# Patient Record
Sex: FEMALE | Race: WHITE | NOT HISPANIC OR LATINO | Employment: FULL TIME | URBAN - METROPOLITAN AREA
[De-identification: names, ages, dates, MRNs, and addresses within clinical notes are randomized per-mention and may not be internally consistent; named-entity substitution may affect disease eponyms.]

---

## 2017-03-21 ENCOUNTER — OFFICE VISIT (OUTPATIENT)
Dept: INTERNAL MEDICINE | Facility: CLINIC | Age: 37
End: 2017-03-21
Payer: COMMERCIAL

## 2017-03-21 VITALS
BODY MASS INDEX: 28.12 KG/M2 | DIASTOLIC BLOOD PRESSURE: 68 MMHG | WEIGHT: 175 LBS | HEIGHT: 66 IN | HEART RATE: 80 BPM | TEMPERATURE: 98.3 F | OXYGEN SATURATION: 98 % | SYSTOLIC BLOOD PRESSURE: 102 MMHG | RESPIRATION RATE: 12 BRPM

## 2017-03-21 DIAGNOSIS — N92.6 IRREGULAR MENSTRUAL CYCLE: Primary | ICD-10-CM

## 2017-03-21 DIAGNOSIS — K64.9 HEMORRHOIDS, UNSPECIFIED HEMORRHOID TYPE: ICD-10-CM

## 2017-03-21 LAB
BASOPHILS # BLD AUTO: 0 10E9/L (ref 0–0.2)
BASOPHILS NFR BLD AUTO: 0.3 %
DIFFERENTIAL METHOD BLD: NORMAL
EOSINOPHIL # BLD AUTO: 0.1 10E9/L (ref 0–0.7)
EOSINOPHIL NFR BLD AUTO: 0.5 %
ERYTHROCYTE [DISTWIDTH] IN BLOOD BY AUTOMATED COUNT: 13.2 % (ref 10–15)
HCT VFR BLD AUTO: 39.8 % (ref 35–47)
HGB BLD-MCNC: 13.3 G/DL (ref 11.7–15.7)
LYMPHOCYTES # BLD AUTO: 2.4 10E9/L (ref 0.8–5.3)
LYMPHOCYTES NFR BLD AUTO: 24.3 %
MCH RBC QN AUTO: 29.6 PG (ref 26.5–33)
MCHC RBC AUTO-ENTMCNC: 33.4 G/DL (ref 31.5–36.5)
MCV RBC AUTO: 88 FL (ref 78–100)
MONOCYTES # BLD AUTO: 0.7 10E9/L (ref 0–1.3)
MONOCYTES NFR BLD AUTO: 7.4 %
NEUTROPHILS # BLD AUTO: 6.7 10E9/L (ref 1.6–8.3)
NEUTROPHILS NFR BLD AUTO: 67.5 %
PLATELET # BLD AUTO: 242 10E9/L (ref 150–450)
RBC # BLD AUTO: 4.5 10E12/L (ref 3.8–5.2)
WBC # BLD AUTO: 10 10E9/L (ref 4–11)

## 2017-03-21 PROCEDURE — 87624 HPV HI-RISK TYP POOLED RSLT: CPT | Performed by: NURSE PRACTITIONER

## 2017-03-21 PROCEDURE — 36415 COLL VENOUS BLD VENIPUNCTURE: CPT | Performed by: NURSE PRACTITIONER

## 2017-03-21 PROCEDURE — 85025 COMPLETE CBC W/AUTO DIFF WBC: CPT | Performed by: NURSE PRACTITIONER

## 2017-03-21 PROCEDURE — G0145 SCR C/V CYTO,THINLAYER,RESCR: HCPCS | Performed by: NURSE PRACTITIONER

## 2017-03-21 PROCEDURE — 84443 ASSAY THYROID STIM HORMONE: CPT | Performed by: NURSE PRACTITIONER

## 2017-03-21 PROCEDURE — 99213 OFFICE O/P EST LOW 20 MIN: CPT | Performed by: NURSE PRACTITIONER

## 2017-03-21 ASSESSMENT — ANXIETY QUESTIONNAIRES
1. FEELING NERVOUS, ANXIOUS, OR ON EDGE: NOT AT ALL
3. WORRYING TOO MUCH ABOUT DIFFERENT THINGS: NOT AT ALL
6. BECOMING EASILY ANNOYED OR IRRITABLE: NOT AT ALL
7. FEELING AFRAID AS IF SOMETHING AWFUL MIGHT HAPPEN: NOT AT ALL
2. NOT BEING ABLE TO STOP OR CONTROL WORRYING: NOT AT ALL
GAD7 TOTAL SCORE: 0
5. BEING SO RESTLESS THAT IT IS HARD TO SIT STILL: NOT AT ALL

## 2017-03-21 ASSESSMENT — PATIENT HEALTH QUESTIONNAIRE - PHQ9: 5. POOR APPETITE OR OVEREATING: NOT AT ALL

## 2017-03-21 NOTE — PATIENT INSTRUCTIONS
Labs in [address]     Ultrasound pelvis   [phone] to schedule     FMG: [facility] [place] [phone]   [email or web address]  FHN: [facility] [place] [phone]   [email or web address]

## 2017-03-21 NOTE — LETTER
March 28, 2017      Gayle Moya  [address] [address] [place] [id number]    Dear Ms.Nasreenbill,    I am happy to inform you that your recent cervical cancer screening test (PAP smear) was normal.      Preventative screenings such as this help to ensure your health for years to come. You should repeat a pap smear in 3 years, unless otherwise directed.      You will still need to return to the clinic every year for your annual exam and other preventive tests.     Please contact the clinic if you have further questions.       Sincerely,      PETRA Fabian CNP

## 2017-03-21 NOTE — MR AVS SNAPSHOT
After Visit Summary   3/21/2017    Gayle Moya    MRN: [id number]           Patient Information     Date Of Birth          1980        Visit Information        Provider Department      3/21/2017 3:40 PM Jacqueline Dodd APRN CNP [facility]        Today's Diagnoses     Irregular menstrual cycle    -  1    Hemorrhoids, unspecified hemorrhoid type          Care Instructions    Labs in [address]     Ultrasound pelvis   [phone] to schedule     FMG: [facility] [phone]   [email or web address]  FHN: [facility] [phone]   [email or web address]        Follow-ups after your visit        Additional Services     COLORECTAL SURGERY REFERRAL       Your provider has referred you to: FMG: [facility] [phone]   [email or web address]  FHN: [facility] [phone]   [email or web address]    Referral Reason(s): Hemorrhoids  Special Concerns: None  This referral is: Elective (week +)  It is OK to leave a message on patient's voicemail.    Please be aware that coverage of these services is subject to the terms and limitations of your health insurance plan.  Call member services at your health plan with any benefit or coverage questions.      Please bring the following with you to your appointment:    (1) Any X-Rays, CTs or MRIs which have been performed.  Contact the facility where they were done to arrange for  prior to your scheduled appointment.    (2) List of current medications  (3) This referral request   (4) Any documents/labs given to you for this referral                  Future tests that were ordered for you today     Open Future Orders        Priority Expected Expires Ordered    US Pelvic Complete w Transvaginal Routine  "2017 3/21/2018 3/21/2017            Who to contact     If you have questions or need follow up information about today's clinic visit or your schedule please contact [facility] directly at [phone].  Normal or non-critical lab and imaging results will be communicated to you by MyChart, letter or phone within 4 business days after the clinic has received the results. If you do not hear from us within 7 days, please contact the clinic through [employer] or phone. If you have a critical or abnormal lab result, we will notify you by phone as soon as possible.  Submit refill requests through [employer] or call your pharmacy and they will forward the refill request to us. Please allow 3 business days for your refill to be completed.          Additional Information About Your Visit        [employer] Information     [employer] lets you send messages to your doctor, view your test results, renew your prescriptions, schedule appointments and more. To sign up, go to [email or web address] . Click on \"Log in\" on the left side of the screen, which will take you to the Welcome page. Then click on \"Sign up Now\" on the right side of the page.     You will be asked to enter the access code listed below, as well as some personal information. Please follow the directions to create your username and password.     Your access code is: [email or web address]  Expires: 2017  4:30 PM     Your access code will  in 90 days. If you need help or a new code, please call your [facility] clinic or [phone].        Care EveryWhere ID     This is your Care EveryWhere ID. This could be used by other organizations to access your [facility] medical records  [id number]        Your Vitals Were     Pulse Temperature Respirations Height Last Period Pulse Oximetry    80 98.3  F (36.8  C) (Oral) 12 5' 6\" (1.676 m) 2017 98%    Breastfeeding? BMI (Body Mass Index)                No 28.25 kg/m2           Blood Pressure from Last 3 " Encounters:   03/21/17 102/68   05/18/16 114/74   11/24/15 108/78    Weight from Last 3 Encounters:   03/21/17 175 lb (79.4 kg)   05/18/16 168 lb (76.2 kg)   11/24/15 168 lb 6.4 oz (76.4 kg)              We Performed the Following     CBC with platelets differential     COLORECTAL SURGERY REFERRAL     Pap imaged thin layer screen with HPV - recommended age 30 - 65 years (select HPV order below)     TSH with free T4 reflex        Primary Care Provider Office Phone # Fax #    Yi Gottlieb [phone]       [facility] [address] [place] [address]        Thank you!     Thank you for choosing [facility]  for your care. Our goal is always to provide you with excellent care. Hearing back from our patients is one way we can continue to improve our services. Please take a few minutes to complete the written survey that you may receive in the mail after your visit with us. Thank you!             Your Updated Medication List - Protect others around you: Learn how to safely use, store and throw away your medicines at www.disposemymeds.org.          This list is accurate as of: 3/21/17  4:30 PM.  Always use your most recent med list.                   Brand Name Dispense Instructions for use    citalopram 20 MG tablet    celeXA    90 tablet    Take 1 tablet (20 mg) by mouth At Bedtime       triamcinolone 0.1 % cream    KENALOG    15 g    Apply sparingly to affected area three times daily for 14 days.       ZYRTEC ALLERGY PO

## 2017-03-21 NOTE — NURSING NOTE
"Chief Complaint   Patient presents with     Vaginal Bleeding     Spotting between periods x a few months.      Hemorrhoids     Check hemorrhoid that developed whild pregnancy.        Initial /68 (BP Location: Left arm, Patient Position: Chair, Cuff Size: Adult Large)  Pulse 80  Temp 98.3  F (36.8  C) (Oral)  Resp 12  Ht 5' 6\" (1.676 m)  Wt 175 lb (79.4 kg)  LMP 03/04/2017  SpO2 98%  Breastfeeding? No  BMI 28.25 kg/m2 Estimated body mass index is 28.25 kg/(m^2) as calculated from the following:    Height as of this encounter: 5' 6\" (1.676 m).    Weight as of this encounter: 175 lb (79.4 kg).  Medication Reconciliation: complete     AISSATOU Larkin      "

## 2017-03-21 NOTE — PROGRESS NOTES
SUBJECTIVE:                                                    Gayle Moya is a 36 year old female who presents to clinic today for the following health issues:    Chief Complaint   Patient presents with     Vaginal Bleeding     Spotting between periods x a few months.    this month and past month  pregnancy not possible   Stress with work and working out - then spotting     Worked out more vigorously and noted spotting twice after     She has positive BRCA gene and mother  breast cancer at younger age so anything abnormal with female parts makes her nervous         Hemorrhoids     Check hemorrhoid that developed while pregnancy. Child is 6 years old              Problem list and histories reviewed & adjusted, as indicated.  Additional history: as documented    Patient Active Problem List   Diagnosis     Supervision of normal pregnancy      delivery delivered     Meningitis     Viral meningitis     Major depressive disorder, single episode, moderate (H)     BRCA gene positive     Past Surgical History   Procedure Laterality Date     Cryotherapy       CERVICAL     Tonsillectomy & adenoidectomy       Hc tooth extraction w/forcep       Hystrosalingogram[        section  3/15/2013     Procedure:  SECTION;   Section for failure to descend;  Surgeon: Avelina Steele MD;  Location:  OR       Social History   Substance Use Topics     Smoking status: Former Smoker     Quit date: 2012     Smokeless tobacco: Not on file     Alcohol use Not on file      Comment: 1 glass of wine every other night     Family History   Problem Relation Age of Onset     Breast Cancer Mother 37           Reviewed and updated as needed this visit by clinical staff  Tobacco  Allergies  Meds  Problems  Soc Hx      Reviewed and updated as needed this visit by Provider  Allergies  Meds  Problems         ROS:  Constitutional, HEENT, cardiovascular, pulmonary, GI, , musculoskeletal,  "neuro, skin, endocrine and psych systems are negative, except as otherwise noted.    OBJECTIVE:                                                    /68 (BP Location: Left arm, Patient Position: Chair, Cuff Size: Adult Large)  Pulse 80  Temp 98.3  F (36.8  C) (Oral)  Resp 12  Ht 5' 6\" (1.676 m)  Wt 175 lb (79.4 kg)  LMP 03/04/2017  SpO2 98%  Breastfeeding? No  BMI 28.25 kg/m2  Body mass index is 28.25 kg/(m^2).  GENERAL: alert and no distress  RESP: lungs clear to auscultation - no rales, rhonchi or wheezes  CV: regular rate and rhythm  ABDOMEN: soft, nontender,  and bowel sounds normal  Pelvic Exam:  Vagina: Moist, pink, no abnormal discharge  Cervix: Pap smear is taken,  no visible lesions; friable with PAP   Uterus: Normal size, non-tender, mobile  Ovaries: No mass, non-tender, mobile  Rectal exam: small external hemorrhoid ; no additional lesion with bearing down   MS: no gross musculoskeletal defects noted, no edema  NEURO: Normal strength and tone, mentation intact and speech normal  PSYCH: mentation appears normal, affect normal/bright    Diagnostic Test Results:  PAP   Ultrasound   Labs      ASSESSMENT/PLAN:                                                              ICD-10-CM    1. Irregular menstrual cycle N92.6 CBC with platelets differential     TSH with free T4 reflex     US Pelvic Complete w Transvaginal     Pap imaged thin layer screen with HPV - recommended age 30 - 65 years (select HPV order below)   2. Hemorrhoids, unspecified hemorrhoid type K64.9 COLORECTAL SURGERY REFERRAL       Patient Instructions   Labs in [address]     Ultrasound pelvis   [phone] to schedule     FMG: [facility] [place] [phone]   [email or web address]  FHN: [facility] [place] [phone]   [email or web address]      PETRA Fabian CNP  [facility]    "

## 2017-03-21 NOTE — LETTER
[facility]  [address], [address]  [place] [address]  [phone]        March 29, 2017    Gayle Moya  [address] [address] [place] [id number]            Dear Ms. Gayle Moya:      The results of your recent blood tests were within acceptable range.  If you have any further questions or problems, please contact our office.    Sincerely,        MARTIN Clemons

## 2017-03-22 LAB — TSH SERPL DL<=0.005 MIU/L-ACNC: 1.64 MU/L (ref 0.4–4)

## 2017-03-22 ASSESSMENT — PATIENT HEALTH QUESTIONNAIRE - PHQ9: SUM OF ALL RESPONSES TO PHQ QUESTIONS 1-9: 0

## 2017-03-22 ASSESSMENT — ANXIETY QUESTIONNAIRES: GAD7 TOTAL SCORE: 0

## 2017-03-23 LAB
COPATH REPORT: NORMAL
PAP: NORMAL

## 2017-03-24 LAB
FINAL DIAGNOSIS: NORMAL
HPV HR 12 DNA CVX QL NAA+PROBE: NEGATIVE
HPV16 DNA SPEC QL NAA+PROBE: NEGATIVE
HPV18 DNA SPEC QL NAA+PROBE: NEGATIVE
SPECIMEN DESCRIPTION: NORMAL

## 2017-03-27 ENCOUNTER — RADIANT APPOINTMENT (OUTPATIENT)
Dept: ULTRASOUND IMAGING | Facility: CLINIC | Age: 37
End: 2017-03-27
Attending: NURSE PRACTITIONER
Payer: COMMERCIAL

## 2017-03-27 DIAGNOSIS — N92.6 IRREGULAR MENSTRUAL CYCLE: ICD-10-CM

## 2017-03-27 PROCEDURE — 76830 TRANSVAGINAL US NON-OB: CPT | Mod: 59 | Performed by: OBSTETRICS & GYNECOLOGY

## 2017-03-27 PROCEDURE — 76856 US EXAM PELVIC COMPLETE: CPT | Performed by: OBSTETRICS & GYNECOLOGY

## 2017-03-28 NOTE — PROGRESS NOTES
Pap done on 3/21/17. Please send nl pap and Neg HPV letter, ([id number]) for pap in 3 years and annual physical in 1 year.   Chart reviewed,  celio.     FADUMO Soto RN

## 2017-05-09 ENCOUNTER — TELEPHONE (OUTPATIENT)
Dept: INTERNAL MEDICINE | Facility: CLINIC | Age: 37
End: 2017-05-09

## 2017-05-09 DIAGNOSIS — Z15.01 BRCA GENE POSITIVE: Primary | ICD-10-CM

## 2017-05-09 DIAGNOSIS — Z15.09 BRCA GENE POSITIVE: Primary | ICD-10-CM

## 2017-05-09 NOTE — TELEPHONE ENCOUNTER
Reason for Call: Request for an order or referral:Referral    Order or referral being requested: Referral for MRI    Date needed: as soon as possible    Has the patient been seen by the PCP for this problem? YES    Additional comments: Need MRI to check Breast Cancer    Phone number Patient can be reached at:  Cell number on file:    Telephone Information:   Mobile [phone]       Best Time:  anytime    Can we leave a detailed message on this number?  YES    Call taken on 5/9/2017 at 4:14 PM by TERESA HODGES

## 2017-05-09 NOTE — TELEPHONE ENCOUNTER
Left message for pt to call back to find out why she needs an MRI.  Having symptoms?    Last OV 3-21-17 with LC for irregular menstrual cycle, hemorrhoids.

## 2017-05-15 NOTE — TELEPHONE ENCOUNTER
Left voice message for pt that MRI order was approved. Provided phone number for [facility] Radiology Scheduling and asked pt to call back if she has any questions.

## 2017-05-15 NOTE — TELEPHONE ENCOUNTER
Pt calls, she states that in  she had BRCA testing done at [facility] and it came back positive. They recommended and MRI at that time, but she had just started to breast feed. Pt's mother  from Breast Cancer at a very young age and sister also tested positive. Pt now done breast feeding and would like to have MRI done now. She is contemplating having preventative mastectomy done, but wants to have MRI first.     Pt will send a copy of the test results and recommendations to our office. Test results received by

## 2017-05-30 ENCOUNTER — HOSPITAL ENCOUNTER (OUTPATIENT)
Dept: MRI IMAGING | Facility: CLINIC | Age: 37
Discharge: HOME | End: 2017-05-30
Attending: INTERNAL MEDICINE | Admitting: INTERNAL MEDICINE
Payer: COMMERCIAL

## 2017-05-30 DIAGNOSIS — Z15.01 BRCA GENE POSITIVE: ICD-10-CM

## 2017-05-30 DIAGNOSIS — Z15.09 BRCA GENE POSITIVE: ICD-10-CM

## 2017-05-30 PROCEDURE — 25000128 H RX IP 250 OP 636: Performed by: INTERNAL MEDICINE

## 2017-05-30 PROCEDURE — 0159T MR BREAST BILATERAL W/O & W CONTRAST: CPT

## 2017-05-30 PROCEDURE — A9585 GADOBUTROL INJECTION: HCPCS | Performed by: INTERNAL MEDICINE

## 2017-05-30 RX ORDER — GADOBUTROL 604.72 MG/ML
10 INJECTION INTRAVENOUS ONCE
Status: COMPLETED | OUTPATIENT
Start: 2017-05-30 | End: 2017-05-30

## 2017-05-30 RX ADMIN — GADOBUTROL 10 ML: 604.72 INJECTION INTRAVENOUS at 13:34

## 2017-05-31 ENCOUNTER — TELEPHONE (OUTPATIENT)
Dept: MAMMOGRAPHY | Facility: CLINIC | Age: 37
End: 2017-05-31

## 2017-05-31 NOTE — TELEPHONE ENCOUNTER
Patient notified of within normal limits breast MRI.  Instructed patient to have annual screening mammogram.  She states since she is BRCA positive she is now planning on bilateral prophylactic mastectomies.  Encouraged her to call with any questions.

## 2017-08-14 ENCOUNTER — TELEPHONE (OUTPATIENT)
Dept: INTERNAL MEDICINE | Facility: CLINIC | Age: 37
End: 2017-08-14

## 2017-08-14 DIAGNOSIS — N83.209 OVARIAN CYST: Primary | ICD-10-CM

## 2017-08-14 NOTE — TELEPHONE ENCOUNTER
Patient states she had a pelvic US done in March 2017 for mid cycle bleeding.  She received a message that she is to schedule a follow up US now as there had been a complex cyst seen last time.  If she is to have US done then please order then leave message for pt once this has been done.  NAY Marroquin R.N.

## 2017-08-14 NOTE — TELEPHONE ENCOUNTER
Left V/M for pt letting her know that PCP placed U/S order and gave her tel # to call to set that up

## 2017-08-15 DIAGNOSIS — F32.1 MAJOR DEPRESSIVE DISORDER, SINGLE EPISODE, MODERATE (H): ICD-10-CM

## 2017-08-15 RX ORDER — CITALOPRAM HYDROBROMIDE 20 MG/1
20 TABLET ORAL AT BEDTIME
Qty: 30 TABLET | Refills: 0 | Status: SHIPPED | OUTPATIENT
Start: 2017-08-15 | End: 2017-09-12

## 2017-08-15 NOTE — TELEPHONE ENCOUNTER
citalopram (CELEXA) 20 MG tablet     Last Written Prescription Date: 11/18/16  Last Fill Quantity: 90, # refills: 1  Last Office Visit with G primary care provider:  03/21/17        Last PHQ-9 score on record=   PHQ-9 SCORE 3/21/2017   Total Score 0

## 2017-08-17 ENCOUNTER — HOSPITAL ENCOUNTER (OUTPATIENT)
Dept: ULTRASOUND IMAGING | Facility: CLINIC | Age: 37
Discharge: HOME | End: 2017-08-17
Attending: INTERNAL MEDICINE | Admitting: INTERNAL MEDICINE
Payer: COMMERCIAL

## 2017-08-17 DIAGNOSIS — N83.209 OVARIAN CYST: ICD-10-CM

## 2017-08-17 PROCEDURE — 76830 TRANSVAGINAL US NON-OB: CPT

## 2017-08-26 ENCOUNTER — TELEPHONE (OUTPATIENT)
Dept: INTERNAL MEDICINE | Facility: CLINIC | Age: 37
End: 2017-08-26

## 2017-08-26 DIAGNOSIS — N83.209 OVARIAN CYST: Primary | ICD-10-CM

## 2017-08-26 NOTE — TELEPHONE ENCOUNTER
Recommend that she sees gynecology for the ovarian cyst on ultrasound.  Appears benign, but with her family history of BRCA gene, would like her to see them.

## 2017-08-28 NOTE — TELEPHONE ENCOUNTER
Pt calls back. Asks what side the ovarian cyst is on. Informed pt that this u/s showed left ovarian cyst - previous u/s showed cyst on the right side.

## 2017-08-28 NOTE — TELEPHONE ENCOUNTER
Pt calls back, informed of recommendation from Dr. Gottlieb. Pt will schedule appt with OB-GYN for follow up.

## 2017-09-12 ENCOUNTER — TELEPHONE (OUTPATIENT)
Dept: INTERNAL MEDICINE | Facility: CLINIC | Age: 37
End: 2017-09-12

## 2017-09-12 DIAGNOSIS — F32.1 MAJOR DEPRESSIVE DISORDER, SINGLE EPISODE, MODERATE (H): ICD-10-CM

## 2017-09-12 RX ORDER — CITALOPRAM HYDROBROMIDE 20 MG/1
20 TABLET ORAL AT BEDTIME
Qty: 90 TABLET | Refills: 0 | Status: SHIPPED | OUTPATIENT
Start: 2017-09-12 | End: 2017-10-10

## 2017-09-12 NOTE — TELEPHONE ENCOUNTER
"Citalopram     Last Written Prescription Date: 8-15-17  Last Fill Quantity: 30, # refills: 0  Last Office Visit with [facility] primary care provider:  5-18-16--depression       Last PHQ-9 score on record=   PHQ-9 SCORE 3/21/2017   Total Score 0     Refilled x 1 8-15-17 with message pt is due for OV.    Spoke with to inform her she is over-due for OV for depression but states \"I've been in your office 3 times and no one told me that I was due for depression.  That's not my problem that I wasn't informed I was due\".    Routing refill request to provider for review/approval because:  Raya given x1 and patient did not follow up, please advise    Please advise, thanks.                "

## 2017-09-12 NOTE — TELEPHONE ENCOUNTER
(Reason for Call:  Medication or medication refill:    Do you use a [facility]?  Name of the pharmacy and phone number for the current request:  [facility] [address]    Name of the medication requested: CITALOPRAM    Other request: NO    Can we leave a detailed message on this number? YES    Phone number patient can be reached at: Cell number on file:    Telephone Information:   Mobile [phone]       Best Time: ANY    Call taken on 9/12/2017 at 11:20 AM by Mae Mathew

## 2017-10-10 ENCOUNTER — OFFICE VISIT (OUTPATIENT)
Dept: INTERNAL MEDICINE | Facility: CLINIC | Age: 37
End: 2017-10-10
Payer: COMMERCIAL

## 2017-10-10 VITALS
WEIGHT: 178 LBS | HEIGHT: 66 IN | HEART RATE: 82 BPM | SYSTOLIC BLOOD PRESSURE: 120 MMHG | OXYGEN SATURATION: 98 % | BODY MASS INDEX: 28.61 KG/M2 | DIASTOLIC BLOOD PRESSURE: 72 MMHG | TEMPERATURE: 98 F

## 2017-10-10 DIAGNOSIS — F32.1 MAJOR DEPRESSIVE DISORDER, SINGLE EPISODE, MODERATE (H): Primary | ICD-10-CM

## 2017-10-10 DIAGNOSIS — Z15.09 BRCA GENE POSITIVE: ICD-10-CM

## 2017-10-10 DIAGNOSIS — Z23 NEED FOR PROPHYLACTIC VACCINATION AND INOCULATION AGAINST INFLUENZA: ICD-10-CM

## 2017-10-10 DIAGNOSIS — N83.202 LEFT OVARIAN CYST: ICD-10-CM

## 2017-10-10 DIAGNOSIS — Z15.01 BRCA GENE POSITIVE: ICD-10-CM

## 2017-10-10 PROCEDURE — 90471 IMMUNIZATION ADMIN: CPT | Performed by: INTERNAL MEDICINE

## 2017-10-10 PROCEDURE — 99213 OFFICE O/P EST LOW 20 MIN: CPT | Mod: 25 | Performed by: INTERNAL MEDICINE

## 2017-10-10 PROCEDURE — 90686 IIV4 VACC NO PRSV 0.5 ML IM: CPT | Performed by: INTERNAL MEDICINE

## 2017-10-10 RX ORDER — CITALOPRAM HYDROBROMIDE 20 MG/1
20 TABLET ORAL AT BEDTIME
Qty: 90 TABLET | Refills: 1 | Status: SHIPPED | OUTPATIENT
Start: 2017-10-10 | End: 2018-04-14

## 2017-10-10 ASSESSMENT — PATIENT HEALTH QUESTIONNAIRE - PHQ9: SUM OF ALL RESPONSES TO PHQ QUESTIONS 1-9: 1

## 2017-10-10 NOTE — NURSING NOTE
"Chief Complaint   Patient presents with     Recheck Medication     citalopram     Referral     for OB/GYn-for possible hysterectomy and bilateral mastectomy-tested positive for braca gene       Initial /72 (BP Location: Left arm, Cuff Size: Adult Large)  Pulse 82  Temp 98  F (36.7  C) (Oral)  Ht 5' 6\" (1.676 m)  Wt 178 lb (80.7 kg)  LMP 09/25/2017  SpO2 98%  Breastfeeding? No  BMI 28.73 kg/m2 Estimated body mass index is 28.73 kg/(m^2) as calculated from the following:    Height as of this encounter: 5' 6\" (1.676 m).    Weight as of this encounter: 178 lb (80.7 kg).  Medication Reconciliation: complete   Jaylyn Castro CMA      "

## 2017-10-10 NOTE — PROGRESS NOTES
Injectable Influenza Immunization Documentation    1.  Is the person to be vaccinated sick today?   No    2. Does the person to be vaccinated have an allergy to a component   of the vaccine?   No    3. Has the person to be vaccinated ever had a serious reaction   to influenza vaccine in the past?   No    4. Has the person to be vaccinated ever had Guillain-Barré syndrome?   No    Form completed by Jaylyn Castro [facility]

## 2017-10-10 NOTE — MR AVS SNAPSHOT
After Visit Summary   10/10/2017    Gayle Moya    MRN: [id number]           Patient Information     Date Of Birth          1980        Visit Information        Provider Department      10/10/2017 8:00 AM Yi Gottlieb MD [facility]        Today's Diagnoses     Major depressive disorder, single episode, moderate (H)    -  1    BRCA gene positive        Left ovarian cyst           Follow-ups after your visit        Additional Services     GENERAL SURG ADULT REFERRAL       Your provider has referred you to: [facility]: [facility] [place] [phone]   [email or web address]    Please be aware that coverage of these services is subject to the terms and limitations of your health insurance plan.  Call member services at your health plan with any benefit or coverage questions.      Please bring the following with you to your appointment:    (1) Any X-Rays, CTs or MRIs which have been performed.  Contact the facility where they were done to arrange for  prior to your scheduled appointment.   (2) List of current medications   (3) This referral request   (4) Any documents/labs given to you for this referral            OB/GYN REFERRAL       Your provider has referred you to:  G: [facility] [phone]   [email or web address]      Please be aware that coverage of these services is subject to the terms and limitations of your health insurance plan.  Call member services at your health plan with any benefit or coverage questions.      Please bring the following with you to your appointment:    (1) Any X-Rays, CTs or MRIs which have been performed.  Contact the facility where they were done to arrange for  prior to your scheduled appointment.   (2) List of current medications   (3) This referral request   (4) Any documents/labs given to you for this referral       "            Future tests that were ordered for you today     Open Future Orders        Priority Expected Expires Ordered    US Pelvic Complete w Transvaginal Routine  10/10/2018 10/10/2017            Who to contact     If you have questions or need follow up information about today's clinic visit or your schedule please contact [facility] directly at [phone].  Normal or non-critical lab and imaging results will be communicated to you by MyChart, letter or phone within 4 business days after the clinic has received the results. If you do not hear from us within 7 days, please contact the clinic through [employer] or phone. If you have a critical or abnormal lab result, we will notify you by phone as soon as possible.  Submit refill requests through [employer] or call your pharmacy and they will forward the refill request to us. Please allow 3 business days for your refill to be completed.          Additional Information About Your Visit        [employer] Information     [employer] lets you send messages to your doctor, view your test results, renew your prescriptions, schedule appointments and more. To sign up, go to [email or web address] . Click on \"Log in\" on the left side of the screen, which will take you to the Welcome page. Then click on \"Sign up Now\" on the right side of the page.     You will be asked to enter the access code listed below, as well as some personal information. Please follow the directions to create your username and password.     Your access code is: [email or web address]  Expires: 2018  8:37 AM     Your access code will  in 90 days. If you need help or a new code, please call your [facility] clinic or [phone].        Care EveryWhere ID     This is your Care EveryWhere ID. This could be used by other organizations to access your [facility] medical records  [id number]        Your Vitals Were     Pulse Temperature Height Last Period Pulse Oximetry Breastfeeding?    82 98  F (36.7 " " C) (Oral) 5' 6\" (1.676 m) 09/25/2017 98% No    BMI (Body Mass Index)                   28.73 kg/m2            Blood Pressure from Last 3 Encounters:   10/10/17 120/72   03/21/17 102/68   05/18/16 114/74    Weight from Last 3 Encounters:   10/10/17 178 lb (80.7 kg)   03/21/17 175 lb (79.4 kg)   05/18/16 168 lb (76.2 kg)              We Performed the Following     GENERAL SURG ADULT REFERRAL     OB/GYN REFERRAL          Where to get your medicines      These medications were sent to [facility] [address] [facility] [address] [place] [address] [address] AT [facility] [address]  [address] [facility] [address], [facility] [address]     Phone:  [phone]     citalopram 20 MG tablet          Primary Care Provider Office Phone # Fax #    Yi Gottlieb [phone]       [address]  [facility] [address]        Equal Access to Services     [facility]: Hadii aad ku hadasho Soomaali, waaxda luqadaha, qaybta kaalmada adeegyada, waxshane marie . So [facility] [phone].    ATENCIÓN: Si habla español, tiene a calderón disposición servicios gratuitos de asistencia lingüística. Llame al [phone].    We comply with applicable federal civil rights laws and [place] laws. We do not discriminate on the basis of race, color, national origin, age, disability, sex, sexual orientation, or gender identity.            Thank you!     Thank you for choosing [facility]  for your care. Our goal is always to provide you with excellent care. Hearing back from our patients is one way we can continue to improve our services. Please take a few minutes to complete the written survey that you may receive in the mail after your visit with us. Thank you!             Your Updated Medication List - Protect others around you: Learn how to safely use, store and throw away your medicines at www.disposemymeds.org.          This list is accurate as of: 10/10/17  8:37 AM.  Always use your most recent " med list.                   Brand Name Dispense Instructions for use Diagnosis    citalopram 20 MG tablet    celeXA    90 tablet    Take 1 tablet (20 mg) by mouth At Bedtime    Major depressive disorder, single episode, moderate (H)       triamcinolone 0.1 % cream    KENALOG    15 g    Apply sparingly to affected area three times daily for 14 days.    Rash       ZYRTEC ALLERGY PO

## 2017-10-10 NOTE — PROGRESS NOTES
"  SUBJECTIVE:   Gayle Moya is a 37 year old female who presents to clinic today for the following health issues:      Depression Followup    Status since last visit: Stable     See PHQ-9 for current symptoms.  Other associated symptoms: None    Complicating factors:   Significant life event:  No   Current substance abuse:  None  Anxiety or Panic symptoms:  No    PHQ-9 Score and MyChart F/U Questions 12/14/2015 11/18/2016 3/21/2017   Total Score 0 1 0   Q9: Suicide Ideation Not at all Not at all Not at all       PHQ-9  English  PHQ-9   Any Language  Suicide Assessment Five-step Evaluation and Treatment (SAFE-T)      Amount of exercise or physical activity: 2-3 days/week for an average of 45-60 minutes    Problems taking medications regularly: No    Medication side effects: none     Diet: regular (no restrictions)    BRCA gene positive.  Patient needs a referral to ob/gyn and general surgery for double mastectomy and double oophorectomy.     Problem list and histories reviewed & adjusted, as indicated.      Reviewed and updated as needed this visit by clinical staffTobacco  Allergies  Meds  Med Hx  Surg Hx  Fam Hx  Soc Hx      Reviewed and updated as needed this visit by Provider         ROS:  C: NEGATIVE for fever, chills, change in weight  E/M: NEGATIVE for ear, mouth and throat problems  R: NEGATIVE for significant cough or SOB  CV: NEGATIVE for chest pain, palpitations or peripheral edema    OBJECTIVE:     /72 (BP Location: Left arm, Cuff Size: Adult Large)  Pulse 82  Temp 98  F (36.7  C) (Oral)  Ht 5' 6\" (1.676 m)  Wt 178 lb (80.7 kg)  LMP 09/25/2017  SpO2 98%  Breastfeeding? No  BMI 28.73 kg/m2  Body mass index is 28.73 kg/(m^2).  GENERAL: healthy, alert and no distress  NECK: no adenopathy, no asymmetry, masses, or scars and thyroid normal to palpation  RESP: lungs clear to auscultation - no rales, rhonchi or wheezes  CV: regular rate and rhythm, normal S1 S2, no S3 or S4, no murmur, " click or rub, no peripheral edema and peripheral pulses strong  Psych: normal affect    ASSESSMENT/PLAN:     (F32.1) Major depressive disorder, single episode, moderate (H)  (primary encounter diagnosis)  Comment: stable  Plan: clexa    (Z15.01,  Z15.02) BRCA gene positive  Comment:   Plan: OB/GYN REFERRAL, GENERAL SURG ADULT REFERRAL                Yi Gottlieb MD  [facility]

## 2018-03-19 ENCOUNTER — OFFICE VISIT (OUTPATIENT)
Dept: SURGERY | Facility: CLINIC | Age: 38
End: 2018-03-19
Payer: COMMERCIAL

## 2018-03-19 VITALS
DIASTOLIC BLOOD PRESSURE: 70 MMHG | HEIGHT: 66 IN | SYSTOLIC BLOOD PRESSURE: 110 MMHG | BODY MASS INDEX: 26.52 KG/M2 | WEIGHT: 165 LBS | HEART RATE: 80 BPM

## 2018-03-19 DIAGNOSIS — Z15.01 BRCA1 GENE MUTATION POSITIVE: Primary | ICD-10-CM

## 2018-03-19 DIAGNOSIS — Z15.09 BRCA1 GENE MUTATION POSITIVE: Primary | ICD-10-CM

## 2018-03-19 PROCEDURE — 99203 OFFICE O/P NEW LOW 30 MIN: CPT | Performed by: SURGERY

## 2018-03-19 NOTE — PROGRESS NOTES
Assessment:    Gayle Moya is seen in consultation for consideration of prophylactic mastectomies, at the request of Yi Gottlieb MD.    Gayle Moya is a 37 year old female with a strong family history of early breast cancer and BRCA 1 gene mutation.  This pt is known to carry a BRCA 1 gene mutation.  She has no current breast complaints.    Plan:  Bilateral mastectomies and reconstructive options have been offered and discussed at length.  We have discussed optional sentinel lymph node biopsies with possible axillary node dissections.     We have discussed the indication, alternatives, risks and expected recovery.  Specifically, the risk of future breast cancer after mastectomy, incisions, scarring, breast deformity, postoperative infection, anesthesia, bleeding, blood transfusion, DVT, PE, postoperative restrictions and physical limitations.  We have discussed the recommended interventions and treatments for these complications.  All questions have been answered to the best of my ability.    Breast MRI: yes- 5/30/17  Oncology consult: No  [facility]  Plastic surgery consult: Yes, Venkatesh, later today.       Tentatively elects for  bilateral total mastectomies (possible nipple sparing) with reconstruction and without sentinel node biopsies pending the results of her upcoming bilateral screening mammography with tomosynthesis.  The patient is interested in coordinating bilateral salpingo-oophorectomies with Dr. Grullon.  I have encouraged her to make an appointment with Dr. Grullon in the near future so that we can coordinate.    Recommended time off work postop:  6 wks.  .      HPI:  Gayle Moya is a 37 year old female who presents with her , Silvia, today to discuss bilateral prophylactic mastectomies.  I have recently met her sister,Jemima, who also carries the gene mutation.  She has a strong family history of early breast cancer and the she  is a known carrier of a BRCA1 gene mutation.     Breast mass:  No , inverted nipples, chronic   Skin rashes, dimpling or nipple changes:  none  Nipple discharge:   none  Does perform self breast exams.  Previous breast biopsies: No  Previous cyst aspiration: No  Previous other breast surgery: No    Hormonal history:  Pre-menopausal, no HRT, pos fertility treatment, and in vitro fertilization x 1.  One pregnancy at 31yo.  No future pregnancies desired.    Family History:  Family history of breast cancer: Yes - M 38yo, Maunt 46yo  Family history of ovarian cancer:  No  Family history of colon cancer: No  Family history of pancreatic cancer: No  Family history of prostate cancer: No    Imaging:  All imaging studies reviewed by me.    MRI BREASTS, BILATERAL, ENHANCED - 2017     HISTORY: Mother with breast cancer at 37. Maternal aunt with breast  cancer. BRCA gene positive. High risk screening.     COMPARISON: None.     TECHNIQUE: Both breasts were simultaneously evaluated using dedicated  breast coil. Axial T2, axial STIR, axial T1 before and at two-minute  intervals out to eight minutes following 10 mL Gadavist administration  and sagittal postcontrast images were performed, as well as  subtraction, CAD and angio mapping.     FINDINGS:      Right Breast: Mild background enhancement. No suspicious enhancement  or abnormal lymph nodes.      Left Breast: Mild background enhancement. No suspicious enhancement or  abnormal lymph nodes.         IMPRESSION: BI-RADS CATEGORY: 1 -  NEGATIVE.     RECOMMENDED FOLLOW-UP: Annual Mammography, or as per patient's high  risk screening protocol.     FAUSTINO REGAN MD    Percutaneous core needle biopsy:   not done    Past Medical History:   has a past medical history of Abnormal Pap smear () and Sprained ankle ().    Past Surgical History:  Past Surgical History:   Procedure Laterality Date      SECTION  3/15/2013    Procedure:  SECTION;   Section  "for failure to descend;  Surgeon: Avelina Steele MD;  Location: [facility] OR     CRYOTHERAPY  2010    CERVICAL     HC TOOTH EXTRACTION W/FORCEP  2001     HYSTROSALINGOGRAM[       TONSILLECTOMY & ADENOIDECTOMY  2004        Social History:  Social History     Social History     Marital status:      Spouse name: N/A     Number of children: N/A     Years of education: N/A     Occupational History     Not on file.     Social History Main Topics     Smoking status: Former Smoker     Quit date: 5/14/2012     Smokeless tobacco: Never Used     Alcohol use Not on file      Comment: 1 glass of wine every other night     Drug use: Yes     Sexual activity: Yes     Partners: Male     Other Topics Concern     Not on file     Social History Narrative        ROS:  The 10 point review of systems is negative other than noted in the HPI and above.    PE:  Vitals: /70  Pulse 80  Ht 5' 6\" (1.676 m)  Wt 165 lb (74.8 kg)  BMI 26.63 kg/m2  General appearance: well-nourished, sitting comfortably, no apparent distress  HEENT:  Head normocephalic and atraumatic, pupils equal and round, conjunctivae clear, mucous membranes moist, external ears and nose normal  Neck: Supple without thyromegaly or masses  Lungs: Respirations unlabored  Lymphatic: No cervical, or supraclavicular lymphadenopathy  Extremities: Warm, without edema  Musculoskeletal:  Normal station and gait  Neurologic: alert, speech is clear, moves all extremities with good strength  Psychiatric: Mood and affect are appropriate  Skin: Without lesions or rashes  Breast:    Symmetrical, large pendulous breasts, with no skin changes, left nipple is inverted and right nipple inverts with examination (pt reports this is chronic).     Parenchyma is heterogeneously dense.   Masses-  none    Incisional scar- none    Lymph:      No supraclavicular/infraclavicular adenopathy.    Axillary adenopathy: none     This note was created using voice recognition software. Undetected word " substitutions or other errors may have occurred.     Time spent with the patient with greater that 50% of the time in discussion was 40 minutes.     Dayana Carter MD    Please route or send letter to:  Primary Care Provider (PCP) and Referring Provider  Dr. Grullon

## 2018-03-19 NOTE — LETTER
2018    Re: Gayle Moya, : 1980    Assessment:    Gayle Moya is seen in consultation for consideration of prophylactic mastectomies, at the request of Yi Gottlieb MD.     Gayle Moya is a 37 year old female with a strong family history of early breast cancer and BRCA 1 gene mutation.  This pt is known to carry a BRCA 1 gene mutation.  She has no current breast complaints.     Plan:  Bilateral mastectomies and reconstructive options have been offered and discussed at length. We have discussed optional sentinel lymph node biopsies with possible axillary node dissections.      We have discussed the indication, alternatives, risks and expected recovery.  Specifically, the risk of future breast cancer after mastectomy, incisions, scarring, breast deformity, postoperative infection, anesthesia, bleeding, blood transfusion, DVT, PE, postoperative restrictions and physical limitations. We have discussed the recommended interventions and treatments for these complications.  All questions have been answered to the best of my ability.     Breast MRI: yes- 17  Oncology consult: No  [facility]  Plastic surgery consult: Yes, Venkatesh, later today.      Tentatively elects for  bilateral total mastectomies (possible nipple sparing) with reconstruction and without sentinel node biopsies pending the results of her upcoming bilateral screening mammography with tomosynthesis.  The patient is interested in coordinating bilateral salpingo-oophorectomies with Dr. Grullon.  I have encouraged her to make an appointment with Dr. Grullon in the near future so that we can coordinate.     Recommended time off work postop:  6 wks.  .     HPI:  Gayle Moya is a 37 year old female who presents with her , Silvia, today to discuss bilateral prophylactic mastectomies.  I have recently met her sister,Jemima, who also carries the gene mutation.  She has a  strong family history of early breast cancer and the she is a known carrier of a BRCA1 gene mutation.      Breast mass:  No , inverted nipples, chronic   Skin rashes, dimpling or nipple changes:  none  Nipple discharge:   none  Does perform self breast exams.  Previous breast biopsies: No  Previous cyst aspiration: No  Previous other breast surgery: No     Hormonal history:  Pre-menopausal, no HRT, pos fertility treatment, and in vitro fertilization x 1.  One pregnancy at 31yo.  No future pregnancies desired.     Family History:  Family history of breast cancer: Yes - M 36yo, Maunt 44yo  Family history of ovarian cancer:  No  Family history of colon cancer: No  Family history of pancreatic cancer: No  Family history of prostate cancer: No     Imaging:  All imaging studies reviewed by me.     MRI BREASTS, BILATERAL, ENHANCED - 5/30/2017      HISTORY: Mother with breast cancer at 37. Maternal aunt with breast  cancer. BRCA gene positive. High risk screening.      COMPARISON: None.      TECHNIQUE: Both breasts were simultaneously evaluated using dedicated  breast coil. Axial T2, axial STIR, axial T1 before and at two-minute  intervals out to eight minutes following 10 mL Gadavist administration  and sagittal postcontrast images were performed, as well as  subtraction, CAD and angio mapping.      FINDINGS:       Right Breast: Mild background enhancement. No suspicious enhancement  or abnormal lymph nodes.       Left Breast: Mild background enhancement. No suspicious enhancement or  abnormal lymph nodes.      IMPRESSION: BI-RADS CATEGORY: 1 -  NEGATIVE.      RECOMMENDED FOLLOW-UP: Annual Mammography, or as per patient's high  risk screening protocol.      FAUSTINO REGAN MD     Percutaneous core needle biopsy:   not done     Past Medical History:  Gayle has a past medical history of Abnormal Pap smear (2010) and Sprained ankle (2009).     ROS:  The 10 point review of systems is negative other than noted in the HPI and  "above.     PE:  Vitals: /70  Pulse 80  Ht 5' 6\" (1.676 m)  Wt 165 lb (74.8 kg)  BMI 26.63 kg/m2  General appearance: well-nourished, sitting comfortably, no apparent distress  HEENT:  Head normocephalic and atraumatic, pupils equal and round, conjunctivae clear, mucous membranes moist, external ears and nose normal  Neck: Supple without thyromegaly or masses  Lungs: Respirations unlabored  Lymphatic: No cervical, or supraclavicular lymphadenopathy  Extremities: Warm, without edema  Musculoskeletal:  Normal station and gait  Neurologic: alert, speech is clear, moves all extremities with good strength  Psychiatric: Mood and affect are appropriate  Skin: Without lesions or rashes  Breast:                         Symmetrical, large pendulous breasts, with no skin changes, left nipple is inverted and right nipple inverts with examination (pt reports this is chronic).                          Parenchyma is heterogeneously dense.                        Masses-  none                         Incisional scar- none     Lymph:                           No supraclavicular/infraclavicular adenopathy.                         Axillary adenopathy: none      Dayana Carter MD       "

## 2018-03-19 NOTE — MR AVS SNAPSHOT
"              After Visit Summary   3/19/2018    Gayle Moya    MRN: [id number]           Patient Information     Date Of Birth          1980        Visit Information        Provider Department      3/19/2018 11:30 AM Dayana Carter MD [facility] General Surgery      Today's Diagnoses     BRCA1 gene mutation positive    -  1       Follow-ups after your visit        Future tests that were ordered for you today     Open Future Orders        Priority Expected Expires Ordered    MA Screen Bilateral w/Terrance Routine 3/19/2018 3/19/2019 3/19/2018            Who to contact     If you have questions or need follow up information about today's clinic visit or your schedule please contact [facility] directly at [phone].  Normal or non-critical lab and imaging results will be communicated to you by [employer], letter or phone within 4 business days after the clinic has received the results. If you do not hear from us within 7 days, please contact the clinic through [employer] or phone. If you have a critical or abnormal lab result, we will notify you by phone as soon as possible.  Submit refill requests through [employer] or call your pharmacy and they will forward the refill request to us. Please allow 3 business days for your refill to be completed.          Additional Information About Your Visit        [employer] Information     [employer] lets you send messages to your doctor, view your test results, renew your prescriptions, schedule appointments and more. To sign up, go to [email or web address] . Click on \"Log in\" on the left side of the screen, which will take you to the Welcome page. Then click on \"Sign up Now\" on the right side of the page.     You will be asked to enter the access code listed below, as well as some personal information. Please follow the directions to create your username and password.     Your access code is: " "[email or web address]  Expires: 2018 12:56 PM     Your access code will  in 90 days. If you need help or a new code, please call your [facility] clinic or [phone].        Care EveryWhere ID     This is your Care EveryWhere ID. This could be used by other organizations to access your [facility] medical records  [id number]        Your Vitals Were     Pulse Height BMI (Body Mass Index)             80 5' 6\" (1.676 m) 26.63 kg/m2          Blood Pressure from Last 3 Encounters:   18 110/70   10/10/17 120/72   17 102/68    Weight from Last 3 Encounters:   18 165 lb (74.8 kg)   10/10/17 178 lb (80.7 kg)   17 175 lb (79.4 kg)               Primary Care Provider Office Phone # Fax #    Yi Gottlieb [phone]       [address] [facility] [address]        Equal Access to Services     [facility]: Hadii aad ku hadasho Soomaali, waaxda luqadaha, qaybta kaalmada adeegyada, tricia mckeon haydonna marie . So [facility] [phone].    ATENCIÓN: Si habla español, tiene a calderón disposición servicios gratuitos de asistencia lingüística. Llame al [phone].    We comply with applicable federal civil rights laws and [place] laws. We do not discriminate on the basis of race, color, national origin, age, disability, sex, sexual orientation, or gender identity.            Thank you!     Thank you for choosing [facility]  for your care. Our goal is always to provide you with excellent care. Hearing back from our patients is one way we can continue to improve our services. Please take a few minutes to complete the written survey that you may receive in the mail after your visit with us. Thank you!             Your Updated Medication List - Protect others around you: Learn how to safely use, store and throw away your medicines at www.disposemymeds.org.          This list is accurate as of 3/19/18 12:56 PM.  Always use your most recent med list.       "             Brand Name Dispense Instructions for use Diagnosis    citalopram 20 MG tablet    celeXA    90 tablet    Take 1 tablet (20 mg) by mouth At Bedtime    Major depressive disorder, single episode, moderate (H)       triamcinolone 0.1 % cream    KENALOG    15 g    Apply sparingly to affected area three times daily for 14 days.    Rash       ZYRTEC ALLERGY PO

## 2018-03-19 NOTE — NURSING NOTE
Breast Patients    BREAST PATIENTS (ALL)    1-Do you have any of the following symptoms? Other: NONE  2-In which breast are you having the symptoms? NONE  3-Do you use hormones?  No  4-Have you had a Mammogram? Yes  Where: [facility]  Date: N/A  5-Have you ever had a breast cyst drained? No  6-Have you ever had a breast biopsy? No  7-Have you ever had a Breast Cancer? No   8-Is there a history of Breast Cancer in your family? Yes   Relationship to you:    Mother, Aunt  9-Have you ever had Ovarian Cancer? No  10-Is there a history of Ovarian Cancer in your family? No  11-Summarize your caffeine intake (i.e. coffee, tea, chocolate, soda etc.): Coffee Daily    BREAST PATIENTS (FEMALE)    12-What age did your periods begin? 15  13-Date your last menstrual period began? 3/1/2018  14-Number of full-term pregnancies: 1  15-Your age when your first child was born? 32  16-Did you nurse your children? Yes  17-Are you pregnant now? No  18-Have you begun menopause? No  19-Have you had either ovary removed?No  20-Do you have breast implants? No

## 2018-04-03 ENCOUNTER — TELEPHONE (OUTPATIENT)
Dept: SURGERY | Facility: CLINIC | Age: 38
End: 2018-04-03

## 2018-04-03 NOTE — TELEPHONE ENCOUNTER
Dr Carter wanted Gayle to schedule a Mammogram when she was in the office 3/19/2018. Gayle left before this was scheduled . A message was left that the orders are in the computer . Gayle can call me to help get this scheduled or the scheduling department [phone]. She was also going to see Dr Riddle and Dr Grullon.  Jackie Carter , CMA

## 2018-04-09 ENCOUNTER — HOSPITAL ENCOUNTER (OUTPATIENT)
Dept: MAMMOGRAPHY | Facility: CLINIC | Age: 38
Discharge: HOME | End: 2018-04-09
Attending: SURGERY | Admitting: SURGERY
Payer: COMMERCIAL

## 2018-04-09 DIAGNOSIS — Z15.09 BRCA1 GENE MUTATION POSITIVE: ICD-10-CM

## 2018-04-09 DIAGNOSIS — Z15.01 BRCA1 GENE MUTATION POSITIVE: ICD-10-CM

## 2018-04-09 PROCEDURE — 77067 SCR MAMMO BI INCL CAD: CPT

## 2018-04-11 ENCOUNTER — TELEPHONE (OUTPATIENT)
Dept: SURGERY | Facility: CLINIC | Age: 38
End: 2018-04-11

## 2018-04-14 DIAGNOSIS — F32.1 MAJOR DEPRESSIVE DISORDER, SINGLE EPISODE, MODERATE (H): ICD-10-CM

## 2018-04-16 RX ORDER — CITALOPRAM HYDROBROMIDE 20 MG/1
TABLET ORAL
Qty: 90 TABLET | Refills: 0 | OUTPATIENT
Start: 2018-04-16

## 2018-04-16 RX ORDER — CITALOPRAM HYDROBROMIDE 20 MG/1
TABLET ORAL
Qty: 90 TABLET | Refills: 0 | Status: SHIPPED | OUTPATIENT
Start: 2018-04-16 | End: 2018-05-17

## 2018-05-17 ENCOUNTER — OFFICE VISIT (OUTPATIENT)
Dept: INTERNAL MEDICINE | Facility: CLINIC | Age: 38
End: 2018-05-17
Payer: COMMERCIAL

## 2018-05-17 VITALS
BODY MASS INDEX: 26.74 KG/M2 | HEART RATE: 80 BPM | TEMPERATURE: 98.4 F | HEIGHT: 66 IN | WEIGHT: 166.4 LBS | SYSTOLIC BLOOD PRESSURE: 102 MMHG | RESPIRATION RATE: 16 BRPM | OXYGEN SATURATION: 97 % | DIASTOLIC BLOOD PRESSURE: 76 MMHG

## 2018-05-17 DIAGNOSIS — F32.1 MAJOR DEPRESSIVE DISORDER, SINGLE EPISODE, MODERATE (H): ICD-10-CM

## 2018-05-17 DIAGNOSIS — Z01.818 PRE-OP EXAM: Primary | ICD-10-CM

## 2018-05-17 DIAGNOSIS — Z80.3 FAMILY HISTORY OF MALIGNANT NEOPLASM OF BREAST: ICD-10-CM

## 2018-05-17 PROCEDURE — 99214 OFFICE O/P EST MOD 30 MIN: CPT | Performed by: NURSE PRACTITIONER

## 2018-05-17 RX ORDER — CITALOPRAM HYDROBROMIDE 20 MG/1
TABLET ORAL
Qty: 90 TABLET | Refills: 3 | Status: SHIPPED | OUTPATIENT
Start: 2018-05-17 | End: 2019-04-09

## 2018-05-17 ASSESSMENT — ANXIETY QUESTIONNAIRES
2. NOT BEING ABLE TO STOP OR CONTROL WORRYING: NOT AT ALL
1. FEELING NERVOUS, ANXIOUS, OR ON EDGE: NOT AT ALL
6. BECOMING EASILY ANNOYED OR IRRITABLE: NOT AT ALL
IF YOU CHECKED OFF ANY PROBLEMS ON THIS QUESTIONNAIRE, HOW DIFFICULT HAVE THESE PROBLEMS MADE IT FOR YOU TO DO YOUR WORK, TAKE CARE OF THINGS AT HOME, OR GET ALONG WITH OTHER PEOPLE: NOT DIFFICULT AT ALL
5. BEING SO RESTLESS THAT IT IS HARD TO SIT STILL: NOT AT ALL
7. FEELING AFRAID AS IF SOMETHING AWFUL MIGHT HAPPEN: NOT AT ALL
GAD7 TOTAL SCORE: 0
3. WORRYING TOO MUCH ABOUT DIFFERENT THINGS: NOT AT ALL

## 2018-05-17 ASSESSMENT — PATIENT HEALTH QUESTIONNAIRE - PHQ9
5. POOR APPETITE OR OVEREATING: NOT AT ALL
SUM OF ALL RESPONSES TO PHQ QUESTIONS 1-9: 0
SUM OF ALL RESPONSES TO PHQ QUESTIONS 1-9: 0
10. IF YOU CHECKED OFF ANY PROBLEMS, HOW DIFFICULT HAVE THESE PROBLEMS MADE IT FOR YOU TO DO YOUR WORK, TAKE CARE OF THINGS AT HOME, OR GET ALONG WITH OTHER PEOPLE: NOT DIFFICULT AT ALL

## 2018-05-17 NOTE — MR AVS SNAPSHOT
After Visit Summary   5/17/2018    Gayle Moya    MRN: [id number]           Patient Information     Date Of Birth          1980        Visit Information        Provider Department      5/17/2018 7:00 AM Jacqueline Dodd APRN CNP [facility]        Today's Diagnoses     Pre-op exam    -  1    Family history of malignant neoplasm of breast        Major depressive disorder, single episode, moderate (H)          Care Instructions    No aspirin ibuprofen or aleve products prior to surgery   May use tylenol products             Follow-ups after your visit        Your next 10 appointments already scheduled     May 29, 2018   Procedure with Dayana Carter MD   [facility] PeriOp Services (--)    [address] [facility] [address]   [phone]            May 29, 2018  9:30 AM CDT   [facility] Same Day Surgery with Dayana Carter MD, Babita Lin PA-C   [facility] Surgery [facility]uling ([facility])    [facility] ([facility])   [phone]              Who to contact     If you have questions or need follow up information about today's clinic visit or your schedule please contact [facility] directly at [phone].  Normal or non-critical lab and imaging results will be communicated to you by [employer], letter or phone within 4 business days after the clinic has received the results. If you do not hear from us within 7 days, please contact the clinic through One[employer] or phone. If you have a critical or abnormal lab result, we will notify you by phone as soon as possible.  Submit refill requests through [employer] or call your pharmacy and they will forward the refill request to us. Please allow 3 business days for your refill to be completed.          Additional Information About Your Visit        One[employer]ofhart Information     [employer] lets you send messages to your  "doctor, view your test results, renew your prescriptions, schedule appointments and more. To sign up, go to [email or web address] . Click on \"Log in\" on the left side of the screen, which will take you to the Welcome page. Then click on \"Sign up Now\" on the right side of the page.     You will be asked to enter the access code listed below, as well as some personal information. Please follow the directions to create your username and password.     Your access code is: [email or web address]  Expires: 2018 12:56 PM     Your access code will  in 90 days. If you need help or a new code, please call your [place] clinic or [phone].        Care EveryWhere ID     This is your Care EveryWhere ID. This could be used by other organizations to access your [place] medical records  [id number]        Your Vitals Were     Pulse Temperature Respirations Height Pulse Oximetry Breastfeeding?    80 98.4  F (36.9  C) (Oral) 16 5' 6\" (1.676 m) 97% No    BMI (Body Mass Index)                   26.86 kg/m2            Blood Pressure from Last 3 Encounters:   18 102/76   18 110/70   10/10/17 120/72    Weight from Last 3 Encounters:   18 166 lb 6.4 oz (75.5 kg)   18 165 lb (74.8 kg)   10/10/17 178 lb (80.7 kg)              Today, you had the following     No orders found for display         Today's Medication Changes          These changes are accurate as of 18  7:36 AM.  If you have any questions, ask your nurse or doctor.               These medicines have changed or have updated prescriptions.        Dose/Directions    citalopram 20 MG tablet   Commonly known as:  celeXA   This may have changed:  See the new instructions.   Used for:  Major depressive disorder, single episode, moderate (H)   Changed by:  Jacqueline Dodd APRN CNP        TAKE 1 TABLET(20 MG) BY MOUTH AT BEDTIME   Quantity:  90 tablet   Refills:  3            Where to get your medicines      These medications were sent to " [facility] - [place], [place] - [address] AT [facility] [address]  [address], [facility] [address]     Phone:  [phone]     citalopram 20 MG tablet                Primary Care Provider Office Phone # Fax #    Yi Gottlieb [phone]       [address] [facility] [address]        Equal Access to Services     BE SOMMER : Hadii aad ku hadasho Soomaali, waaxda luqadaha, qaybta kaalmada adeegyada, waxay idiin hayaan adeeg kharash la'glennn ah. So [facility] [phone].    ATENCIÓN: Si habla español, tiene a calderón disposición servicios gratuitos de asistencia lingüística. [facility] [phone].    We comply with applicable federal civil rights laws and [place] laws. We do not discriminate on the basis of race, color, national origin, age, disability, sex, sexual orientation, or gender identity.            Thank you!     Thank you for choosing [facility]  for your care. Our goal is always to provide you with excellent care. Hearing back from our patients is one way we can continue to improve our services. Please take a few minutes to complete the written survey that you may receive in the mail after your visit with us. Thank you!             Your Updated Medication List - Protect others around you: Learn how to safely use, store and throw away your medicines at www.disposemymeds.org.          This list is accurate as of 5/17/18  7:36 AM.  Always use your most recent med list.                   Brand Name Dispense Instructions for use Diagnosis    citalopram 20 MG tablet    celeXA    90 tablet    TAKE 1 TABLET(20 MG) BY MOUTH AT BEDTIME    Major depressive disorder, single episode, moderate (H)       triamcinolone 0.1 % cream    KENALOG    15 g    Apply sparingly to affected area three times daily for 14 days.    Rash       ZYRTEC ALLERGY PO

## 2018-05-17 NOTE — PROGRESS NOTES
[place] [address]  [facility] [address]  [phone] for HPI/ROS submitted by the patient on 2018   If you checked off any problems, how difficult have these problems made it for you to do your work, take care of things at home, or get along with other people?: Not difficult at all  PHQ9 TOTAL SCORE: 0    Dept: [phone]    PRE-OP EVALUATION:  Today's date: 2018    Gayle Moya (: 1980) presents for pre-operative evaluation assessment as requested by Raul/Sammi.  She requires evaluation and anesthesia risk assessment prior to undergoing surgery/procedure for treatment of bilat mastectomy.    Fax number for surgical facility: [facility]  Primary Physician: BRAIN Dodd CNP  Type of Anesthesia Anticipated: General    Patient has a Health Care Directive or Living Will:  YES     Preop Questions 2018   Who is doing your surgery? dr wallis & dr perry   What are you having done? double mastectomy with tissue expanders   Date of Surgery/Procedure: may 29   1.  Do you have a history of Heart attack, stroke, stent, coronary bypass surgery, or other heart surgery? No   2.  Do you ever have any pain or discomfort in your chest? No   3.  Do you have a history of  Heart Failure? No   4.   Are you troubled by shortness of breath when:  walking on a level surface, or up a slight hill, or at night? No   5.  Do you currently have a cold, bronchitis or other respiratory infection? No   6.  Do you have a cough, shortness of breath, or wheezing? No   7.  Do you sometimes get pains in the calves of your legs when you walk? No   8. Do you or anyone in your family have previous history of blood clots? No   9.  Do you or does anyone in your family have a serious bleeding problem such as prolonged bleeding following surgeries or cuts? No   10. Have you ever had problems with anemia or been told to take iron pills? No   11. Have you had any abnormal blood loss such as  black, tarry or bloody stools, or abnormal vaginal bleeding? No   12. Have you ever had a blood transfusion? No   13. Have you or any of your relatives ever had problems with anesthesia? No   14. Do you have sleep apnea, excessive snoring or daytime drowsiness? No   15. Do you have any prosthetic heart valves? No   16. Do you have prosthetic joints? No   17. Is there any chance that you may be pregnant? No         HPI:     HPI related to upcoming procedure: mastectomy preventative for BRCA 1 positive and young mother breast cancer age dx 39       See problem list for active medical problems.  Problems all longstanding and stable, except as noted/documented.  See ROS for pertinent symptoms related to these conditions.                                                                                                                                                          .    MEDICAL HISTORY:     Patient Active Problem List    Diagnosis Date Noted     BRCA gene positive 2016     Priority: Medium     Major depressive disorder, single episode, moderate (H) 2015     Priority: Medium     Viral meningitis 2015     Priority: Medium     Meningitis 2015     Priority: Medium      delivery delivered 03/15/2013     Priority: Medium     Supervision of normal pregnancy 2013     Priority: Medium      Past Medical History:   Diagnosis Date     Abnormal Pap smear     CRYO     BRCA gene mutation positive in female     BRCA 1     Sprained ankle      Past Surgical History:   Procedure Laterality Date      SECTION  3/15/2013    Procedure:  SECTION;   Section for failure to descend;  Surgeon: Avelina Steele MD;  Location: [facility] OR     CRYOTHERAPY  2010    CERVICAL     HC TOOTH EXTRACTION W/FORCEP       HYSTROSALINGOGRAM[       TONSILLECTOMY & ADENOIDECTOMY  2004     Current Outpatient Prescriptions   Medication Sig Dispense Refill     Cetirizine HCl (ZYRTEC ALLERGY  "PO)        citalopram (CELEXA) 20 MG tablet TAKE 1 TABLET(20 MG) BY MOUTH AT BEDTIME 90 tablet 3     triamcinolone (KENALOG) 0.1 % cream Apply sparingly to affected area three times daily for 14 days. 15 g 0     [DISCONTINUED] citalopram (CELEXA) 20 MG tablet TAKE 1 TABLET(20 MG) BY MOUTH AT BEDTIME 90 tablet 0     OTC products: None, except as noted above    Allergies   Allergen Reactions     Atenolol Other (See Comments)     \"sleeplessness; hallucinations\"      Latex Allergy: NO    Social History   Substance Use Topics     Smoking status: Former Smoker     Quit date: 5/14/2012     Smokeless tobacco: Never Used     Alcohol use No      Comment: 2-3 drinks per week     History   Drug Use No       REVIEW OF SYSTEMS:   CONSTITUTIONAL: NEGATIVE for fever, chills, change in weight  INTEGUMENTARY/SKIN: NEGATIVE for worrisome rashes, moles or lesions  EYES: NEGATIVE for vision changes or irritation  ENT/MOUTH: NEGATIVE for ear, mouth and throat problems  RESP: NEGATIVE for significant cough or SOB  BREAST: NEGATIVE for masses, tenderness or discharge  CV: NEGATIVE for chest pain, palpitations or peripheral edema  GI: NEGATIVE for nausea, abdominal pain, heartburn, or change in bowel habits  : NEGATIVE for frequency, dysuria, or hematuria  MUSCULOSKELETAL: NEGATIVE for significant arthralgias or myalgia  NEURO: NEGATIVE for weakness, dizziness or paresthesias  ENDOCRINE: NEGATIVE for temperature intolerance, skin/hair changes  HEME: NEGATIVE for bleeding problems  PSYCHIATRIC: NEGATIVE for changes in mood or affect    EXAM:   /76 (BP Location: Left arm, Patient Position: Chair, Cuff Size: Adult Large)  Pulse 80  Temp 98.4  F (36.9  C) (Oral)  Resp 16  Ht 5' 6\" (1.676 m)  Wt 166 lb 6.4 oz (75.5 kg)  SpO2 97%  Breastfeeding? No  BMI 26.86 kg/m2    GENERAL APPEARANCE:  alert and no distress     HENT: ear canals and TM's normal and nose and mouth without ulcers or lesions     RESP: lungs clear to auscultation - " no rales, rhonchi or wheezes     CV: regular rates and rhythm, normal S1 S2, no S3 or S4 and no murmur, click or rub  Breast- normal mammogram and plan for removal - exam not done      ABDOMEN:  soft, nontender, no HSM or masses and bowel sounds normal     MS: extremities normal- no gross deformities noted, no evidence of inflammation in joints, FROM in all extremities.     SKIN: no suspicious lesions or rashes     NEURO: Normal strength and tone, sensory exam grossly normal, mentation intact and speech normal     PSYCH: mentation appears normal. and affect normal/bright    DIAGNOSTICS:   No labs or EKG required for low risk surgery (cataract, skin procedure, breast biopsy, etc)    Recent Labs   Lab Test  03/21/17   1645  05/18/16   0906  09/06/15   0759   HGB  13.3  13.3  11.3*   PLT  242  188  130*   NA   --   142  143   POTASSIUM   --   4.3  4.1   CR   --   0.65  0.61        IMPRESSION:   Reason for surgery/procedure: need for prophylactic breast removal   Diagnosis/reason for consult: preop     The proposed surgical procedure is considered INTERMEDIATE risk.    REVISED CARDIAC RISK INDEX  The patient has the following serious cardiovascular risks for perioperative complications such as (MI, PE, VFib and 3  AV Block):  No serious cardiac risks  INTERPRETATION: 0 risks: Class I (very low risk - 0.4% complication rate)    The patient has the following additional risks for perioperative complications:  No identified additional risks      ICD-10-CM    1. Pre-op exam Z01.818    2. Family history of malignant neoplasm of breast Z80.3    3. Major depressive disorder, single episode, moderate (H) F32.1 citalopram (CELEXA) 20 MG tablet       RECOMMENDATIONS:         --Patient is to take all scheduled medications on the day of surgery EXCEPT for modifications listed below.    APPROVAL GIVEN to proceed with proposed procedure, without further diagnostic evaluation       Signed Electronically by: PETRA Fabian  CNP    Copy of this evaluation report is provided to requesting physician.    [facility] Preop Guidelines    Revised Cardiac Risk Index  Answers for HPI/ROS submitted by the patient on 5/17/2018   If you checked off any problems, how difficult have these problems made it for you to do your work, take care of things at home, or get along with other people?: Not difficult at all  PHQ9 TOTAL SCORE: 0

## 2018-05-18 ASSESSMENT — PATIENT HEALTH QUESTIONNAIRE - PHQ9: SUM OF ALL RESPONSES TO PHQ QUESTIONS 1-9: 0

## 2018-05-18 ASSESSMENT — ANXIETY QUESTIONNAIRES: GAD7 TOTAL SCORE: 0

## 2018-05-24 NOTE — PHARMACY-ADMISSION MEDICATION HISTORY
Medication reconciliation completed by pre-admitting.    Prior to Admission medications    Medication Sig Last Dose Taking? Auth Provider   Cetirizine HCl (ZYRTEC ALLERGY PO) Take 5 mg by mouth daily   Yes Reported, Patient   citalopram (CELEXA) 20 MG tablet TAKE 1 TABLET(20 MG) BY MOUTH AT BEDTIME  Yes Jacqueline Dodd APRN CNP   triamcinolone (KENALOG) 0.1 % cream Apply sparingly to affected area three times daily for 14 days.  Yes Yi Gottlieb MD

## 2018-05-24 NOTE — H&P (VIEW-ONLY)
[place] [address]  [facility] [address]  [phone] for HPI/ROS submitted by the patient on 2018   If you checked off any problems, how difficult have these problems made it for you to do your work, take care of things at home, or get along with other people?: Not difficult at all  PHQ9 TOTAL SCORE: 0    Dept: [phone]    PRE-OP EVALUATION:  Today's date: 2018    Gayle Moya (: 1980) presents for pre-operative evaluation assessment as requested by Raul/Sammi.  She requires evaluation and anesthesia risk assessment prior to undergoing surgery/procedure for treatment of bilat mastectomy.    Fax number for surgical facility: [facility]  Primary Physician: BRAIN Dodd CNP  Type of Anesthesia Anticipated: General    Patient has a Health Care Directive or Living Will:  YES     Preop Questions 2018   Who is doing your surgery? dr wallis & dr perry   What are you having done? double mastectomy with tissue expanders   Date of Surgery/Procedure: may 29   1.  Do you have a history of Heart attack, stroke, stent, coronary bypass surgery, or other heart surgery? No   2.  Do you ever have any pain or discomfort in your chest? No   3.  Do you have a history of  Heart Failure? No   4.   Are you troubled by shortness of breath when:  walking on a level surface, or up a slight hill, or at night? No   5.  Do you currently have a cold, bronchitis or other respiratory infection? No   6.  Do you have a cough, shortness of breath, or wheezing? No   7.  Do you sometimes get pains in the calves of your legs when you walk? No   8. Do you or anyone in your family have previous history of blood clots? No   9.  Do you or does anyone in your family have a serious bleeding problem such as prolonged bleeding following surgeries or cuts? No   10. Have you ever had problems with anemia or been told to take iron pills? No   11. Have you had any abnormal blood loss such as  black, tarry or bloody stools, or abnormal vaginal bleeding? No   12. Have you ever had a blood transfusion? No   13. Have you or any of your relatives ever had problems with anesthesia? No   14. Do you have sleep apnea, excessive snoring or daytime drowsiness? No   15. Do you have any prosthetic heart valves? No   16. Do you have prosthetic joints? No   17. Is there any chance that you may be pregnant? No         HPI:     HPI related to upcoming procedure: mastectomy preventative for BRCA 1 positive and young mother breast cancer age dx 39       See problem list for active medical problems.  Problems all longstanding and stable, except as noted/documented.  See ROS for pertinent symptoms related to these conditions.                                                                                                                                                          .    MEDICAL HISTORY:     Patient Active Problem List    Diagnosis Date Noted     BRCA gene positive 2016     Priority: Medium     Major depressive disorder, single episode, moderate (H) 2015     Priority: Medium     Viral meningitis 2015     Priority: Medium     Meningitis 2015     Priority: Medium      delivery delivered 03/15/2013     Priority: Medium     Supervision of normal pregnancy 2013     Priority: Medium      Past Medical History:   Diagnosis Date     Abnormal Pap smear     CRYO     BRCA gene mutation positive in female     BRCA 1     Sprained ankle      Past Surgical History:   Procedure Laterality Date      SECTION  3/15/2013    Procedure:  SECTION;   Section for failure to descend;  Surgeon: Avelina Steele MD;  Location: [facility] OR     CRYOTHERAPY  2010    CERVICAL     HC TOOTH EXTRACTION W/FORCEP       HYSTROSALINGOGRAM[       TONSILLECTOMY & ADENOIDECTOMY  2004     Current Outpatient Prescriptions   Medication Sig Dispense Refill     Cetirizine HCl (ZYRTEC ALLERGY  "PO)        citalopram (CELEXA) 20 MG tablet TAKE 1 TABLET(20 MG) BY MOUTH AT BEDTIME 90 tablet 3     triamcinolone (KENALOG) 0.1 % cream Apply sparingly to affected area three times daily for 14 days. 15 g 0     [DISCONTINUED] citalopram (CELEXA) 20 MG tablet TAKE 1 TABLET(20 MG) BY MOUTH AT BEDTIME 90 tablet 0     OTC products: None, except as noted above    Allergies   Allergen Reactions     Atenolol Other (See Comments)     \"sleeplessness; hallucinations\"      Latex Allergy: NO    Social History   Substance Use Topics     Smoking status: Former Smoker     Quit date: 5/14/2012     Smokeless tobacco: Never Used     Alcohol use No      Comment: 2-3 drinks per week     History   Drug Use No       REVIEW OF SYSTEMS:   CONSTITUTIONAL: NEGATIVE for fever, chills, change in weight  INTEGUMENTARY/SKIN: NEGATIVE for worrisome rashes, moles or lesions  EYES: NEGATIVE for vision changes or irritation  ENT/MOUTH: NEGATIVE for ear, mouth and throat problems  RESP: NEGATIVE for significant cough or SOB  BREAST: NEGATIVE for masses, tenderness or discharge  CV: NEGATIVE for chest pain, palpitations or peripheral edema  GI: NEGATIVE for nausea, abdominal pain, heartburn, or change in bowel habits  : NEGATIVE for frequency, dysuria, or hematuria  MUSCULOSKELETAL: NEGATIVE for significant arthralgias or myalgia  NEURO: NEGATIVE for weakness, dizziness or paresthesias  ENDOCRINE: NEGATIVE for temperature intolerance, skin/hair changes  HEME: NEGATIVE for bleeding problems  PSYCHIATRIC: NEGATIVE for changes in mood or affect    EXAM:   /76 (BP Location: Left arm, Patient Position: Chair, Cuff Size: Adult Large)  Pulse 80  Temp 98.4  F (36.9  C) (Oral)  Resp 16  Ht 5' 6\" (1.676 m)  Wt 166 lb 6.4 oz (75.5 kg)  SpO2 97%  Breastfeeding? No  BMI 26.86 kg/m2    GENERAL APPEARANCE:  alert and no distress     HENT: ear canals and TM's normal and nose and mouth without ulcers or lesions     RESP: lungs clear to auscultation - " no rales, rhonchi or wheezes     CV: regular rates and rhythm, normal S1 S2, no S3 or S4 and no murmur, click or rub  Breast- normal mammogram and plan for removal - exam not done      ABDOMEN:  soft, nontender, no HSM or masses and bowel sounds normal     MS: extremities normal- no gross deformities noted, no evidence of inflammation in joints, FROM in all extremities.     SKIN: no suspicious lesions or rashes     NEURO: Normal strength and tone, sensory exam grossly normal, mentation intact and speech normal     PSYCH: mentation appears normal. and affect normal/bright    DIAGNOSTICS:   No labs or EKG required for low risk surgery (cataract, skin procedure, breast biopsy, etc)    Recent Labs   Lab Test  03/21/17   1645  05/18/16   0906  09/06/15   0759   HGB  13.3  13.3  11.3*   PLT  242  188  130*   NA   --   142  143   POTASSIUM   --   4.3  4.1   CR   --   0.65  0.61        IMPRESSION:   Reason for surgery/procedure: need for prophylactic breast removal   Diagnosis/reason for consult: preop     The proposed surgical procedure is considered INTERMEDIATE risk.    REVISED CARDIAC RISK INDEX  The patient has the following serious cardiovascular risks for perioperative complications such as (MI, PE, VFib and 3  AV Block):  No serious cardiac risks  INTERPRETATION: 0 risks: Class I (very low risk - 0.4% complication rate)    The patient has the following additional risks for perioperative complications:  No identified additional risks      ICD-10-CM    1. Pre-op exam Z01.818    2. Family history of malignant neoplasm of breast Z80.3    3. Major depressive disorder, single episode, moderate (H) F32.1 citalopram (CELEXA) 20 MG tablet       RECOMMENDATIONS:         --Patient is to take all scheduled medications on the day of surgery EXCEPT for modifications listed below.    APPROVAL GIVEN to proceed with proposed procedure, without further diagnostic evaluation       Signed Electronically by: PETRA Fabian  CNP    Copy of this evaluation report is provided to requesting physician.    [facility] Preop Guidelines    Revised Cardiac Risk Index  Answers for HPI/ROS submitted by the patient on 5/17/2018   If you checked off any problems, how difficult have these problems made it for you to do your work, take care of things at home, or get along with other people?: Not difficult at all  PHQ9 TOTAL SCORE: 0

## 2018-05-29 ENCOUNTER — OFFICE VISIT (OUTPATIENT)
Dept: SURGERY | Facility: PHYSICIAN GROUP | Age: 38
End: 2018-05-29
Payer: COMMERCIAL

## 2018-05-29 ENCOUNTER — ANESTHESIA (OUTPATIENT)
Dept: SURGERY | Facility: CLINIC | Age: 38
End: 2018-05-29
Payer: COMMERCIAL

## 2018-05-29 ENCOUNTER — ANESTHESIA EVENT (OUTPATIENT)
Dept: SURGERY | Facility: CLINIC | Age: 38
End: 2018-05-29
Payer: COMMERCIAL

## 2018-05-29 ENCOUNTER — HOSPITAL ENCOUNTER (OUTPATIENT)
Facility: CLINIC | Age: 38
LOS: 1 days | Discharge: HOME | End: 2018-05-30
Attending: SURGERY | Admitting: PLASTIC SURGERY
Payer: COMMERCIAL

## 2018-05-29 DIAGNOSIS — Z90.13 ACQUIRED ABSENCE OF BOTH BREASTS AND NIPPLES: Primary | ICD-10-CM

## 2018-05-29 DIAGNOSIS — Z53.9 ERRONEOUS ENCOUNTER--DISREGARD: Primary | ICD-10-CM

## 2018-05-29 LAB
CREAT SERPL-MCNC: 0.69 MG/DL (ref 0.52–1.04)
GFR SERPL CREATININE-BSD FRML MDRD: >90 ML/MIN/1.7M2
HCG UR QL: NEGATIVE
POTASSIUM SERPL-SCNC: 3.8 MMOL/L (ref 3.4–5.3)

## 2018-05-29 PROCEDURE — 36000056 ZZH SURGERY LEVEL 3 1ST 30 MIN: Performed by: SURGERY

## 2018-05-29 PROCEDURE — 25000125 ZZHC RX 250: Performed by: PLASTIC SURGERY

## 2018-05-29 PROCEDURE — 84132 ASSAY OF SERUM POTASSIUM: CPT | Performed by: ANESTHESIOLOGY

## 2018-05-29 PROCEDURE — 25000125 ZZHC RX 250: Performed by: NURSE ANESTHETIST, CERTIFIED REGISTERED

## 2018-05-29 PROCEDURE — 40000306 ZZH STATISTIC PRE PROC ASSESS II: Performed by: SURGERY

## 2018-05-29 PROCEDURE — 25800025 ZZH RX 258: Performed by: PLASTIC SURGERY

## 2018-05-29 PROCEDURE — 93010 ELECTROCARDIOGRAM REPORT: CPT | Performed by: INTERNAL MEDICINE

## 2018-05-29 PROCEDURE — 27210995 ZZH RX 272: Performed by: PLASTIC SURGERY

## 2018-05-29 PROCEDURE — 81025 URINE PREGNANCY TEST: CPT | Performed by: ANESTHESIOLOGY

## 2018-05-29 PROCEDURE — 25000132 ZZH RX MED GY IP 250 OP 250 PS 637: Performed by: PLASTIC SURGERY

## 2018-05-29 PROCEDURE — 36000058 ZZH SURGERY LEVEL 3 EA 15 ADDTL MIN: Performed by: SURGERY

## 2018-05-29 PROCEDURE — 25000128 H RX IP 250 OP 636: Performed by: NURSE ANESTHETIST, CERTIFIED REGISTERED

## 2018-05-29 PROCEDURE — 25000128 H RX IP 250 OP 636: Performed by: ANESTHESIOLOGY

## 2018-05-29 PROCEDURE — 36415 COLL VENOUS BLD VENIPUNCTURE: CPT | Performed by: ANESTHESIOLOGY

## 2018-05-29 PROCEDURE — 27210794 ZZH OR GENERAL SUPPLY STERILE: Performed by: SURGERY

## 2018-05-29 PROCEDURE — 19303 MAST SIMPLE COMPLETE: CPT | Mod: 50 | Performed by: SURGERY

## 2018-05-29 PROCEDURE — 19303 MAST SIMPLE COMPLETE: CPT | Mod: AS | Performed by: PHYSICIAN ASSISTANT

## 2018-05-29 PROCEDURE — 25000566 ZZH SEVOFLURANE, EA 15 MIN: Performed by: SURGERY

## 2018-05-29 PROCEDURE — 25000128 H RX IP 250 OP 636: Performed by: SURGERY

## 2018-05-29 PROCEDURE — 82565 ASSAY OF CREATININE: CPT | Performed by: ANESTHESIOLOGY

## 2018-05-29 PROCEDURE — 25000125 ZZHC RX 250: Performed by: ANESTHESIOLOGY

## 2018-05-29 PROCEDURE — 27210995 ZZH RX 272: Performed by: SURGERY

## 2018-05-29 PROCEDURE — 71000013 ZZH RECOVERY PHASE 1 LEVEL 1 EA ADDTL HR: Performed by: SURGERY

## 2018-05-29 PROCEDURE — 37000009 ZZH ANESTHESIA TECHNICAL FEE, EACH ADDTL 15 MIN: Performed by: SURGERY

## 2018-05-29 PROCEDURE — 27810169 ZZH OR IMPLANT GENERAL: Performed by: SURGERY

## 2018-05-29 PROCEDURE — 88307 TISSUE EXAM BY PATHOLOGIST: CPT | Mod: 26 | Performed by: SURGERY

## 2018-05-29 PROCEDURE — 25000125 ZZHC RX 250: Performed by: SURGERY

## 2018-05-29 PROCEDURE — 25000128 H RX IP 250 OP 636: Performed by: PLASTIC SURGERY

## 2018-05-29 PROCEDURE — 37000008 ZZH ANESTHESIA TECHNICAL FEE, 1ST 30 MIN: Performed by: SURGERY

## 2018-05-29 PROCEDURE — 88307 TISSUE EXAM BY PATHOLOGIST: CPT | Performed by: SURGERY

## 2018-05-29 PROCEDURE — 71000012 ZZH RECOVERY PHASE 1 LEVEL 1 FIRST HR: Performed by: SURGERY

## 2018-05-29 DEVICE — TISSUE EXPANDER IMPLANT ALLERGAN 500ML 133MX-13: Type: IMPLANTABLE DEVICE | Site: BREAST | Status: FUNCTIONAL

## 2018-05-29 RX ORDER — DIMENHYDRINATE 50 MG/ML
25 INJECTION, SOLUTION INTRAMUSCULAR; INTRAVENOUS
Status: DISCONTINUED | OUTPATIENT
Start: 2018-05-29 | End: 2018-05-29 | Stop reason: HOSPADM

## 2018-05-29 RX ORDER — CEFAZOLIN SODIUM 1 G/3ML
1 INJECTION, POWDER, FOR SOLUTION INTRAMUSCULAR; INTRAVENOUS SEE ADMIN INSTRUCTIONS
Status: DISCONTINUED | OUTPATIENT
Start: 2018-05-29 | End: 2018-05-29 | Stop reason: HOSPADM

## 2018-05-29 RX ORDER — PROPOFOL 10 MG/ML
INJECTION, EMULSION INTRAVENOUS CONTINUOUS PRN
Status: DISCONTINUED | OUTPATIENT
Start: 2018-05-29 | End: 2018-05-29

## 2018-05-29 RX ORDER — ACETAMINOPHEN 500 MG
1000 TABLET ORAL ONCE
Status: COMPLETED | OUTPATIENT
Start: 2018-05-29 | End: 2018-05-29

## 2018-05-29 RX ORDER — GLYCOPYRROLATE 0.2 MG/ML
INJECTION, SOLUTION INTRAMUSCULAR; INTRAVENOUS PRN
Status: DISCONTINUED | OUTPATIENT
Start: 2018-05-29 | End: 2018-05-29

## 2018-05-29 RX ORDER — ALBUTEROL SULFATE 0.83 MG/ML
2.5 SOLUTION RESPIRATORY (INHALATION) EVERY 4 HOURS PRN
Status: DISCONTINUED | OUTPATIENT
Start: 2018-05-29 | End: 2018-05-29 | Stop reason: HOSPADM

## 2018-05-29 RX ORDER — METHOCARBAMOL 750 MG/1
750 TABLET, FILM COATED ORAL 4 TIMES DAILY PRN
Status: DISCONTINUED | OUTPATIENT
Start: 2018-05-29 | End: 2018-05-30 | Stop reason: HOSPADM

## 2018-05-29 RX ORDER — LIDOCAINE HYDROCHLORIDE 10 MG/ML
INJECTION, SOLUTION INFILTRATION; PERINEURAL PRN
Status: DISCONTINUED | OUTPATIENT
Start: 2018-05-29 | End: 2018-05-29

## 2018-05-29 RX ORDER — HYDROMORPHONE HYDROCHLORIDE 1 MG/ML
0.2 INJECTION, SOLUTION INTRAMUSCULAR; INTRAVENOUS; SUBCUTANEOUS
Status: DISCONTINUED | OUTPATIENT
Start: 2018-05-29 | End: 2018-05-30 | Stop reason: HOSPADM

## 2018-05-29 RX ORDER — SODIUM CHLORIDE, SODIUM LACTATE, POTASSIUM CHLORIDE, CALCIUM CHLORIDE 600; 310; 30; 20 MG/100ML; MG/100ML; MG/100ML; MG/100ML
INJECTION, SOLUTION INTRAVENOUS CONTINUOUS
Status: DISCONTINUED | OUTPATIENT
Start: 2018-05-29 | End: 2018-05-29 | Stop reason: HOSPADM

## 2018-05-29 RX ORDER — ACETAMINOPHEN 325 MG/1
650 TABLET ORAL EVERY 6 HOURS PRN
Status: DISCONTINUED | OUTPATIENT
Start: 2018-05-29 | End: 2018-05-30 | Stop reason: HOSPADM

## 2018-05-29 RX ORDER — ONDANSETRON 2 MG/ML
4 INJECTION INTRAMUSCULAR; INTRAVENOUS EVERY 6 HOURS PRN
Status: DISCONTINUED | OUTPATIENT
Start: 2018-05-29 | End: 2018-05-30 | Stop reason: HOSPADM

## 2018-05-29 RX ORDER — ONDANSETRON 4 MG/1
4 TABLET, ORALLY DISINTEGRATING ORAL EVERY 6 HOURS PRN
Status: DISCONTINUED | OUTPATIENT
Start: 2018-05-29 | End: 2018-05-30 | Stop reason: HOSPADM

## 2018-05-29 RX ORDER — OXYCODONE HCL 10 MG/1
10 TABLET, FILM COATED, EXTENDED RELEASE ORAL ONCE
Status: COMPLETED | OUTPATIENT
Start: 2018-05-29 | End: 2018-05-29

## 2018-05-29 RX ORDER — LIDOCAINE 40 MG/G
CREAM TOPICAL
Status: DISCONTINUED | OUTPATIENT
Start: 2018-05-29 | End: 2018-05-29 | Stop reason: HOSPADM

## 2018-05-29 RX ORDER — IBUPROFEN 600 MG/1
600 TABLET, FILM COATED ORAL EVERY 6 HOURS PRN
Status: DISCONTINUED | OUTPATIENT
Start: 2018-05-29 | End: 2018-05-30 | Stop reason: HOSPADM

## 2018-05-29 RX ORDER — GABAPENTIN 600 MG/1
600 TABLET ORAL ONCE
Status: COMPLETED | OUTPATIENT
Start: 2018-05-29 | End: 2018-05-29

## 2018-05-29 RX ORDER — CEFAZOLIN SODIUM 2 G/100ML
2 INJECTION, SOLUTION INTRAVENOUS
Status: DISCONTINUED | OUTPATIENT
Start: 2018-05-29 | End: 2018-05-29 | Stop reason: HOSPADM

## 2018-05-29 RX ORDER — SODIUM CHLORIDE, SODIUM LACTATE, POTASSIUM CHLORIDE, CALCIUM CHLORIDE 600; 310; 30; 20 MG/100ML; MG/100ML; MG/100ML; MG/100ML
INJECTION, SOLUTION INTRAVENOUS CONTINUOUS
Status: DISCONTINUED | OUTPATIENT
Start: 2018-05-29 | End: 2018-05-30 | Stop reason: HOSPADM

## 2018-05-29 RX ORDER — KETOROLAC TROMETHAMINE 30 MG/ML
INJECTION, SOLUTION INTRAMUSCULAR; INTRAVENOUS PRN
Status: DISCONTINUED | OUTPATIENT
Start: 2018-05-29 | End: 2018-05-29

## 2018-05-29 RX ORDER — LABETALOL HYDROCHLORIDE 5 MG/ML
10 INJECTION, SOLUTION INTRAVENOUS
Status: DISCONTINUED | OUTPATIENT
Start: 2018-05-29 | End: 2018-05-29 | Stop reason: HOSPADM

## 2018-05-29 RX ORDER — NALOXONE HYDROCHLORIDE 0.4 MG/ML
.1-.4 INJECTION, SOLUTION INTRAMUSCULAR; INTRAVENOUS; SUBCUTANEOUS
Status: DISCONTINUED | OUTPATIENT
Start: 2018-05-29 | End: 2018-05-29

## 2018-05-29 RX ORDER — CITALOPRAM HYDROBROMIDE 20 MG/1
20 TABLET ORAL DAILY
Status: DISCONTINUED | OUTPATIENT
Start: 2018-05-29 | End: 2018-05-30 | Stop reason: HOSPADM

## 2018-05-29 RX ORDER — ONDANSETRON 4 MG/1
4 TABLET, ORALLY DISINTEGRATING ORAL EVERY 30 MIN PRN
Status: DISCONTINUED | OUTPATIENT
Start: 2018-05-29 | End: 2018-05-29 | Stop reason: HOSPADM

## 2018-05-29 RX ORDER — NALOXONE HYDROCHLORIDE 0.4 MG/ML
.1-.4 INJECTION, SOLUTION INTRAMUSCULAR; INTRAVENOUS; SUBCUTANEOUS
Status: DISCONTINUED | OUTPATIENT
Start: 2018-05-29 | End: 2018-05-30 | Stop reason: HOSPADM

## 2018-05-29 RX ORDER — CEFAZOLIN SODIUM 2 G/100ML
2 INJECTION, SOLUTION INTRAVENOUS
Status: COMPLETED | OUTPATIENT
Start: 2018-05-29 | End: 2018-05-29

## 2018-05-29 RX ORDER — FENTANYL CITRATE 50 UG/ML
25-50 INJECTION, SOLUTION INTRAMUSCULAR; INTRAVENOUS
Status: DISCONTINUED | OUTPATIENT
Start: 2018-05-29 | End: 2018-05-29 | Stop reason: HOSPADM

## 2018-05-29 RX ORDER — ONDANSETRON 2 MG/ML
INJECTION INTRAMUSCULAR; INTRAVENOUS PRN
Status: DISCONTINUED | OUTPATIENT
Start: 2018-05-29 | End: 2018-05-29

## 2018-05-29 RX ORDER — FENTANYL CITRATE 50 UG/ML
INJECTION, SOLUTION INTRAMUSCULAR; INTRAVENOUS PRN
Status: DISCONTINUED | OUTPATIENT
Start: 2018-05-29 | End: 2018-05-29

## 2018-05-29 RX ORDER — SCOLOPAMINE TRANSDERMAL SYSTEM 1 MG/1
1 PATCH, EXTENDED RELEASE TRANSDERMAL
Status: DISCONTINUED | OUTPATIENT
Start: 2018-05-29 | End: 2018-05-29 | Stop reason: HOSPADM

## 2018-05-29 RX ORDER — HYDROCODONE BITARTRATE AND ACETAMINOPHEN 5; 325 MG/1; MG/1
1-2 TABLET ORAL EVERY 4 HOURS PRN
Status: DISCONTINUED | OUTPATIENT
Start: 2018-05-29 | End: 2018-05-30

## 2018-05-29 RX ORDER — PROCHLORPERAZINE MALEATE 5 MG
10 TABLET ORAL EVERY 6 HOURS PRN
Status: DISCONTINUED | OUTPATIENT
Start: 2018-05-29 | End: 2018-05-30 | Stop reason: HOSPADM

## 2018-05-29 RX ORDER — MEPERIDINE HYDROCHLORIDE 25 MG/ML
12.5 INJECTION INTRAMUSCULAR; INTRAVENOUS; SUBCUTANEOUS EVERY 5 MIN PRN
Status: DISCONTINUED | OUTPATIENT
Start: 2018-05-29 | End: 2018-05-29 | Stop reason: HOSPADM

## 2018-05-29 RX ORDER — ONDANSETRON 2 MG/ML
4 INJECTION INTRAMUSCULAR; INTRAVENOUS EVERY 30 MIN PRN
Status: DISCONTINUED | OUTPATIENT
Start: 2018-05-29 | End: 2018-05-29 | Stop reason: HOSPADM

## 2018-05-29 RX ORDER — PROPOFOL 10 MG/ML
INJECTION, EMULSION INTRAVENOUS PRN
Status: DISCONTINUED | OUTPATIENT
Start: 2018-05-29 | End: 2018-05-29

## 2018-05-29 RX ORDER — DIAZEPAM 10 MG/2ML
2.5 INJECTION, SOLUTION INTRAMUSCULAR; INTRAVENOUS
Status: DISCONTINUED | OUTPATIENT
Start: 2018-05-29 | End: 2018-05-29 | Stop reason: HOSPADM

## 2018-05-29 RX ORDER — BUPIVACAINE HYDROCHLORIDE 2.5 MG/ML
INJECTION, SOLUTION EPIDURAL; INFILTRATION; INTRACAUDAL PRN
Status: DISCONTINUED | OUTPATIENT
Start: 2018-05-29 | End: 2018-05-29 | Stop reason: HOSPADM

## 2018-05-29 RX ORDER — DEXAMETHASONE SODIUM PHOSPHATE 4 MG/ML
INJECTION, SOLUTION INTRA-ARTICULAR; INTRALESIONAL; INTRAMUSCULAR; INTRAVENOUS; SOFT TISSUE PRN
Status: DISCONTINUED | OUTPATIENT
Start: 2018-05-29 | End: 2018-05-29

## 2018-05-29 RX ORDER — LIDOCAINE 40 MG/G
CREAM TOPICAL
Status: DISCONTINUED | OUTPATIENT
Start: 2018-05-29 | End: 2018-05-30 | Stop reason: HOSPADM

## 2018-05-29 RX ADMIN — HYDROMORPHONE HYDROCHLORIDE 0.2 MG: 1 INJECTION, SOLUTION INTRAMUSCULAR; INTRAVENOUS; SUBCUTANEOUS at 17:35

## 2018-05-29 RX ADMIN — SODIUM CHLORIDE, POTASSIUM CHLORIDE, SODIUM LACTATE AND CALCIUM CHLORIDE: 600; 310; 30; 20 INJECTION, SOLUTION INTRAVENOUS at 09:50

## 2018-05-29 RX ADMIN — HYDROMORPHONE HYDROCHLORIDE 1 MG: 1 INJECTION, SOLUTION INTRAMUSCULAR; INTRAVENOUS; SUBCUTANEOUS at 10:01

## 2018-05-29 RX ADMIN — PHENYLEPHRINE HYDROCHLORIDE 100 MCG: 10 INJECTION, SOLUTION INTRAMUSCULAR; INTRAVENOUS; SUBCUTANEOUS at 12:37

## 2018-05-29 RX ADMIN — PHENYLEPHRINE HYDROCHLORIDE 100 MCG: 10 INJECTION, SOLUTION INTRAMUSCULAR; INTRAVENOUS; SUBCUTANEOUS at 12:16

## 2018-05-29 RX ADMIN — PHENYLEPHRINE HYDROCHLORIDE 150 MCG: 10 INJECTION, SOLUTION INTRAMUSCULAR; INTRAVENOUS; SUBCUTANEOUS at 11:00

## 2018-05-29 RX ADMIN — ROCURONIUM BROMIDE 20 MG: 10 INJECTION INTRAVENOUS at 10:01

## 2018-05-29 RX ADMIN — FENTANYL CITRATE 50 MCG: 50 INJECTION, SOLUTION INTRAMUSCULAR; INTRAVENOUS at 11:28

## 2018-05-29 RX ADMIN — HYDROMORPHONE HYDROCHLORIDE 0.2 MG: 1 INJECTION, SOLUTION INTRAMUSCULAR; INTRAVENOUS; SUBCUTANEOUS at 19:38

## 2018-05-29 RX ADMIN — PROPOFOL 200 MG: 10 INJECTION, EMULSION INTRAVENOUS at 10:01

## 2018-05-29 RX ADMIN — DEXAMETHASONE SODIUM PHOSPHATE 8 MG: 4 INJECTION, SOLUTION INTRA-ARTICULAR; INTRALESIONAL; INTRAMUSCULAR; INTRAVENOUS; SOFT TISSUE at 10:01

## 2018-05-29 RX ADMIN — GABAPENTIN 600 MG: 600 TABLET, FILM COATED ORAL at 09:02

## 2018-05-29 RX ADMIN — KETOROLAC TROMETHAMINE 30 MG: 30 INJECTION, SOLUTION INTRAMUSCULAR at 10:01

## 2018-05-29 RX ADMIN — FENTANYL CITRATE 150 MCG: 50 INJECTION, SOLUTION INTRAMUSCULAR; INTRAVENOUS at 10:01

## 2018-05-29 RX ADMIN — PHENYLEPHRINE HYDROCHLORIDE 100 MCG: 10 INJECTION, SOLUTION INTRAMUSCULAR; INTRAVENOUS; SUBCUTANEOUS at 13:45

## 2018-05-29 RX ADMIN — PROCHLORPERAZINE EDISYLATE 10 MG: 5 INJECTION INTRAMUSCULAR; INTRAVENOUS at 20:28

## 2018-05-29 RX ADMIN — SODIUM CHLORIDE, POTASSIUM CHLORIDE, SODIUM LACTATE AND CALCIUM CHLORIDE: 600; 310; 30; 20 INJECTION, SOLUTION INTRAVENOUS at 11:58

## 2018-05-29 RX ADMIN — MIDAZOLAM 2 MG: 1 INJECTION INTRAMUSCULAR; INTRAVENOUS at 09:50

## 2018-05-29 RX ADMIN — ACETAMINOPHEN 1000 MG: 500 TABLET, FILM COATED ORAL at 09:07

## 2018-05-29 RX ADMIN — CEFAZOLIN 1 G: 1 INJECTION, POWDER, FOR SOLUTION INTRAMUSCULAR; INTRAVENOUS at 12:05

## 2018-05-29 RX ADMIN — PHENYLEPHRINE HYDROCHLORIDE 100 MCG: 10 INJECTION, SOLUTION INTRAMUSCULAR; INTRAVENOUS; SUBCUTANEOUS at 13:27

## 2018-05-29 RX ADMIN — OXYCODONE HYDROCHLORIDE 10 MG: 10 TABLET, FILM COATED, EXTENDED RELEASE ORAL at 09:02

## 2018-05-29 RX ADMIN — PHENYLEPHRINE HYDROCHLORIDE 100 MCG: 10 INJECTION, SOLUTION INTRAMUSCULAR; INTRAVENOUS; SUBCUTANEOUS at 12:44

## 2018-05-29 RX ADMIN — HYDROMORPHONE HYDROCHLORIDE 0.2 MG: 1 INJECTION, SOLUTION INTRAMUSCULAR; INTRAVENOUS; SUBCUTANEOUS at 22:17

## 2018-05-29 RX ADMIN — CEFAZOLIN SODIUM 2 G: 2 INJECTION, SOLUTION INTRAVENOUS at 10:05

## 2018-05-29 RX ADMIN — LIDOCAINE HYDROCHLORIDE 30 MG: 10 INJECTION, SOLUTION INFILTRATION; PERINEURAL at 10:01

## 2018-05-29 RX ADMIN — ONDANSETRON 4 MG: 2 INJECTION INTRAMUSCULAR; INTRAVENOUS at 10:01

## 2018-05-29 RX ADMIN — SCOPOLAMINE 1 PATCH: 1 PATCH, EXTENDED RELEASE TRANSDERMAL at 09:09

## 2018-05-29 RX ADMIN — PROPOFOL 80 MCG/KG/MIN: 10 INJECTION, EMULSION INTRAVENOUS at 10:01

## 2018-05-29 RX ADMIN — PHENYLEPHRINE HYDROCHLORIDE 100 MCG: 10 INJECTION, SOLUTION INTRAMUSCULAR; INTRAVENOUS; SUBCUTANEOUS at 10:54

## 2018-05-29 RX ADMIN — CITALOPRAM HYDROBROMIDE 20 MG: 20 TABLET ORAL at 20:00

## 2018-05-29 RX ADMIN — SODIUM CHLORIDE, POTASSIUM CHLORIDE, SODIUM LACTATE AND CALCIUM CHLORIDE: 600; 310; 30; 20 INJECTION, SOLUTION INTRAVENOUS at 15:39

## 2018-05-29 RX ADMIN — PHENYLEPHRINE HYDROCHLORIDE 100 MCG: 10 INJECTION, SOLUTION INTRAMUSCULAR; INTRAVENOUS; SUBCUTANEOUS at 10:22

## 2018-05-29 RX ADMIN — PHENYLEPHRINE HYDROCHLORIDE 50 MCG: 10 INJECTION, SOLUTION INTRAMUSCULAR; INTRAVENOUS; SUBCUTANEOUS at 12:29

## 2018-05-29 RX ADMIN — ONDANSETRON 4 MG: 2 INJECTION INTRAMUSCULAR; INTRAVENOUS at 13:53

## 2018-05-29 RX ADMIN — CEFAZOLIN 1 G: 1 INJECTION, POWDER, FOR SOLUTION INTRAMUSCULAR; INTRAVENOUS at 14:02

## 2018-05-29 RX ADMIN — SODIUM CHLORIDE, POTASSIUM CHLORIDE, SODIUM LACTATE AND CALCIUM CHLORIDE: 600; 310; 30; 20 INJECTION, SOLUTION INTRAVENOUS at 10:35

## 2018-05-29 RX ADMIN — ONDANSETRON 4 MG: 2 INJECTION, SOLUTION INTRAMUSCULAR; INTRAVENOUS at 19:14

## 2018-05-29 RX ADMIN — GLYCOPYRROLATE 0.2 MG: 0.2 INJECTION, SOLUTION INTRAMUSCULAR; INTRAVENOUS at 10:01

## 2018-05-29 NOTE — OR NURSING
Left Breast Tissue: 1031 g, 100 cc of nacl in tissue expander  Right Breast Tissue: 1026 g, 100 cc of nacl in tissue expander

## 2018-05-29 NOTE — OP NOTE
"General Surgery Operative Note      Pre-operative diagnosis: BRCA 1 gene mutation   Post-operative diagnosis: Same   Procedure: Bilateral prophylactic total mastectomies    Surgeon: Dayana Carter MD   Assistant(s): Babita Lin PA-C  The Physician Assistant was medically necessary for their expertise in prepping, suctioning, suturing and retraction.   Anesthesia: General    Estimated blood loss:   30 cc     Specimens:   ID Type Source Tests Collected by Time Destination   A : Right Breast - stitch zev 12 O'clock  Tissue Breast, Right SURGICAL PATHOLOGY EXAM Dayana Carter MD 5/29/2018 11:07 AM    B : Left Breast- Stitch Zev 12:00 Tissue Breast, Left SURGICAL PATHOLOGY EXAM Dayana Carter MD 5/29/2018 12:08 PM           INDICATION:  Gayle Moya is a 37 year old female with a strong family history of early breast cancer and BRCA 1 gene mutation.  This pt is known to carry a BRCA 1 gene mutation.  She has no current breast complaints and her breast imaging is reassuring.  DESCRIPTION OF PROCEDURE: The patient was taken to the operating room and placed on the table in supine position. The bilateral chest, breasts and axillae were prepped and draped in standard sterile fashion. A left inverted \"t\" incision incorporating the nipple areolar complex was made. The incision was carried down into the subcutanous tissue. Flaps were raised superiorly, medially, inferiorly and laterally down to the chest wall.  Her breasts were quite pendulous and her skin and subcutaneous tissue was thin.  Hemostasis was maintained with electrocautery and the harmonic scalpel. The breast was then lifted from the pectoralis muscle including the pectoralis fascia using the Bovie electrocautery. The breast was marked with a suture at the 12 o'clock position and passed off the field as specimen. We inspected the field carefully for hemostasis and irrigated with sterile saline. Once we were satisfied, we packed the " wound with a saline-soaked lap. The same procedure was performed on the right. The operation was then turned over to Dr. Clara Riddle for the the reconstructive portion of this procedure including tissue expander placement and free nipple flaps. Sponge and instrument counts were correct.   Dayana Carter MD

## 2018-05-29 NOTE — IP AVS SNAPSHOT
[place] [id number] [facility]    [address]    [facility] [address]    Phone:  [phone]    Fax:  [phone]                                       After Visit Summary   5/29/2018    Gayle Moya    MRN: [id number]           After Visit Summary Signature Page     I have received my discharge instructions, and my questions have been answered. I have discussed any challenges I see with this plan with the nurse or doctor.    ..........................................................................................................................................  Patient/Patient Representative Signature      ..........................................................................................................................................  Patient Representative Print Name and Relationship to Patient    ..................................................               ................................................  Date                                            Time    ..........................................................................................................................................  Reviewed by Signature/Title    ...................................................              ..............................................  Date                                                            Time

## 2018-05-29 NOTE — ANESTHESIA CARE TRANSFER NOTE
Patient: Gayle Moya    Procedure(s):  Prophylactic Total Bilateral Mastectomies, Free Nipple Graft, Bilateral Tissue Expanders  - Wound Class: I-Clean   - Wound Class: I-Clean    Diagnosis: BRCA 1 Positive   Diagnosis Additional Information: No value filed.    Anesthesia Type:   General, LMA     Note:  Airway :Face Mask  Patient transferred to:PACU  Comments: VSS.  Spontaneously breathing O2 per open face mask.  Report given to RNHandoff Report: Identifed the Patient, Identified the Reponsible Provider, Reviewed the pertinent medical history, Discussed the surgical course, Reviewed Intra-OP anesthesia mangement and issues during anesthesia, Set expectations for post-procedure period and Allowed opportunity for questions and acknowledgement of understanding      Vitals: (Last set prior to Anesthesia Care Transfer)    CRNA VITALS  5/29/2018 1357 - 5/29/2018 1437      5/29/2018             NIBP: 98/55    Pulse: 80    NIBP Mean: 70    SpO2: 100 %    Resp Rate (observed): 11                Electronically Signed By: PETRA Sandoval CRNA  May 29, 2018  2:37 PM

## 2018-05-29 NOTE — MR AVS SNAPSHOT
"              After Visit Summary   2018    Gayle Moya    MRN: [id number]           Patient Information     Date Of Birth          1980        Visit Information        Provider Department      2018 9:30 AM Babita Lin PA-C; Dayana Carter MD [facility] General Surgery      Today's Diagnoses     ERRONEOUS ENCOUNTER--DISREGARD    -  1       Follow-ups after your visit        Who to contact     If you have questions or need follow up information about today's clinic visit or your schedule please contact [facility] directly at [phone].  Normal or non-critical lab and imaging results will be communicated to you by [employer], letter or phone within 4 business days after the clinic has received the results. If you do not hear from us within 7 days, please contact the clinic through [employer] or phone. If you have a critical or abnormal lab result, we will notify you by phone as soon as possible.  Submit refill requests through [employer] or call your pharmacy and they will forward the refill request to us. Please allow 3 business days for your refill to be completed.          Additional Information About Your Visit        MyChart Information     [employer] lets you send messages to your doctor, view your test results, renew your prescriptions, schedule appointments and more. To sign up, go to [email or web address] . Click on \"Log in\" on the left side of the screen, which will take you to the Welcome page. Then click on \"Sign up Now\" on the right side of the page.     You will be asked to enter the access code listed below, as well as some personal information. Please follow the directions to create your username and password.     Your access code is: [email or web address]  Expires: 2018 12:56 PM     Your access code will  in 90 days. If you need help or a new code, please call your [place] clinic or " [phone].        Care EveryWhere ID     This is your Care EveryWhere ID. This could be used by other organizations to access your [place] medical records  [id number]        Your Vitals Were     Last Period                   05/18/2018            Blood Pressure from Last 3 Encounters:   05/30/18 102/52   05/17/18 102/76   03/19/18 110/70    Weight from Last 3 Encounters:   05/29/18 163 lb (73.9 kg)   05/17/18 166 lb 6.4 oz (75.5 kg)   03/19/18 165 lb (74.8 kg)              Today, you had the following     No orders found for display       Primary Care Provider Office Phone # Fax #    Yi Gottlieb [phone]       [address]  [facility] [address]        Equal Access to Services     [facility]: Hadii dhruv delarosa hadasho Soomaali, waaxda luqadaha, qaybta kaalmada adeegyada, tricia marie . So [facility] [phone].    ATENCIÓN: Si habla español, tiene a calderón disposición servicios gratuitos de asistencia lingüística. Rey al [phone].    We comply with applicable federal civil rights laws and [place] laws. We do not discriminate on the basis of race, color, national origin, age, disability, sex, sexual orientation, or gender identity.            Thank you!     Thank you for choosing [facility]  for your care. Our goal is always to provide you with excellent care. Hearing back from our patients is one way we can continue to improve our services. Please take a few minutes to complete the written survey that you may receive in the mail after your visit with us. Thank you!             Your Updated Medication List - Protect others around you: Learn how to safely use, store and throw away your medicines at www.disposemymeds.org.          This list is accurate as of 5/29/18  8:12 AM.  Always use your most recent med list.                   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    Rash       ZYRTEC ALLERGY PO      Take 5 mg by mouth daily

## 2018-05-29 NOTE — IP AVS SNAPSHOT
MRN:[id number]                      After Visit Summary   5/29/2018    Gayle Moya    MRN: [id number]           Thank you!     Thank you for choosing [facility] for your care. Our goal is always to provide you with excellent care. Hearing back from our patients is one way we can continue to improve our services. Please take a few minutes to complete the written survey that you may receive in the mail after you visit. If you would like to speak to someone directly about your visit please contact Patient Relations at [phone]. Thank you!          Patient Information     Date Of Birth          1980        Designated Caregiver       Most Recent Value    Caregiver    Will someone help with your care after discharge? yes    Name of designated caregiver Glenroy Swenson    Phone number of caregiver [phone]    Caregiver address [place]      About your hospital stay     You were admitted on:  May 29, 2018 You last received care in the:  [place] [address] [facility]    You were discharged on:  May 30, 2018       Who to Call     For medical emergencies, please call 911.  For non-urgent questions about your medical care, please call your primary care provider or clinic, [phone]  For questions related to your surgery, please call your surgery clinic        Attending Provider     Provider Specialty    Dayana Carter MD Surgery    Venkatesh, Clara Matute MD Plastic Surgery       Primary Care Provider Office Phone # Fax #    Yi Michael Gottlieb [phone]      After Care Instructions     Discharge Instructions       Patient to follow up with appointment with Dr. Riddle in 1 weeks            Dressing       Remove all dressings 24-48 hrs after surgery. Leave tegaderm and gauze over nipple in place. May then shower with all incisions/drains uncoverd. Apply Aquaphor to incisions daily. Camisole/soft bra prn comfort.  Record drain output daily. Strip  drains daily.                  Further instructions from your care team       HOME CARE FOLLOWING MASTECTOMY  ALIDA Bains, CARLOS Bauer, NIKO Erwin    APPOINTMENT WITH YOUR SURGEON:  All patients are to schedule a post-op appointment with their general surgeon.  Once you are home, call the office to schedule the appointment for 2-3 weeks from the date of your surgery.  You may need to be seen sooner if you have a drain in place, especially if you are not being seen by plastic surgery.      Appointments to see your general surgeon at any of our locations can be made by calling:  #[phone].      If you have not yet received your final pathology report from your surgery, you will receive a phone call from your surgeon with these results.  You will be able to ask questions and receive more in-depth explanation at your post-op appointment.  This appointment will also be when you discuss further evaluation, treatment, and referral to oncology and/or radiation oncology if this is necessary.    DRAINS:  If you have a drain in place, you will need a separate appointment with a nurse in the office to have this removed.  You will have a form to keep track of the output of your drain.  When the output reaches a point where the total for a 24 hour period is less than 30cc s, you are ready to have the drain removed.  At that point you can call the office and talk to the nurse to arrange coming into the office to have this done.  If you have more than one drain in place, they may not all be removed at the same time, as the outputs can be very different from each.  The nurse will help you to manage this and help decide when the remaining drains will be taken out.    INCISIONAL CARE:  The incision:  The stitches at your incision dissolve over time.  The only stitches that need to be removed are those holding the drain in place.  This will be removed at the same time as the drain.  You may have  steri-strips (thin strips of tape) or dermabond (a type of surgical glue that appears as a shiny substance) on your incision.  Steri-strips may eventually start to peel up at the ends and fall off; otherwise they may be removed by you or your surgeon 10-14 days after your surgery.  Dermabond may eventually become flaky and wears off in the 2-4 weeks following surgery.      Drainage and bandages:  It is normal to have some drainage from the incisions initially after surgery (unless you have dermabond in place).  This amount should gradually decrease over time.  Incisions and drain sites should be covered with a bandage if there is any drainage present.  If none is present, a bandage is not required, but may still be applied if it is more comfortable to have one in place (example: clothing rubbing on incision).  After a drain has been removed, you may have drainage from the site.  It is recommended to keep a bandage over this site until all drainage has stopped.     BATHING:  You are allowed to bath (shallow water in a tub only) or shower 24-48 hours after your surgery.  Mild soap is OK to use near these sites.  When bathing, do not allow the incision or drain site to become submersed in water as this may increase the risk of infection.    DIET:  No restrictions.  Increased fluid intake is recommended. While taking pain medications, increase dietary fiber or add a fiber supplementation like Metamucil or Citrucel to help prevent constipation - a possible side effect of pain medications.    DISCOMFORT:  Begin taking pain pills before discomfort is severe.  When possible, take pain medication with food to minimize side effects.  Intermittent use of ice packs may help during the first 48 hours.  Expect gradual improvement.    ACTIVITY:  You may feel like resting more after surgery.  Rest when you feel it is needed.  You may climb stairs and do light activities or housework, but be careful not to overdo.  Minimize reaching  overhead initially after surgery.  Pace yourself to allow a gradual increase in activity.  Driving is NOT recommended until you feel comfortable and safe driving AND are off all narcotic pain medication.  You may resume sexual activity as soon as you feel comfortable doing so, but prevent any pressure on the incisional area.      If you had a  sentinel node biopsy  at the time of your surgery:  You have no restrictions in addition to those noted above.    If you had an  axillary node dissection  at the time of your surgery:  You are recommended to restrict the activity of the arm on the side the dissection was done on.  This means:  no reaching overhead until cleared to do so by your surgeon, no carrying weight on that side greater than a couple pounds, no injections/vaccinations/ blood samples from that side, and no blood pressure measurements on that side.  It is also recommended to elevate the arm on pillows several times a day to decrease/minimize swelling, and avoid sleeping on that arm.    PROSTHESIS/RECONSTRUCTION:  Do not feel pressured to wear a prosthesis or undergo reconstruction if you would prefer not to.  It is definitely your choice and we will assist you in any way we can.    If you are unable to have reconstruction, or have chosen not to have reconstruction at this time, you may have a prosthesis:  You may get a special camisole in the hospital that also comes with filled inserts to use as a temporary prosthesis.  The inserts are adjustable in size (simply remove stuffing to size) and are completely optional for your use.  They are used in this setting as they do not put extra pressure on your incision.  The camisole has a Velcro pocket on each side that the insert would fit into.  This pocket is also a good spot to place your drain bulb and tubing, thereby minimizing the chance of the tubing  catching  on clothing, etc, as you move about.  After you are further along in the healing process, you  will be given appropriate prescriptions for a prosthesis that will be professionally fitted for you.    If you have had reconstructive surgery at the time of your mastectomy:  The above also applies regarding the camisole and temporary prosthesis.  See above.  The main difference for you is that your plastic surgeon will be following up with you regarding your incisions, removal of drains, and any post-op questions you have about healing.  Your general surgeon will still contact you about your final pathology report and see you back for a post-op appointment (see above), but this can wait until after all of your drains have been removed by your plastic surgeon.           CONTACT US IF THE FOLLOWING DEVELOPS:   1. A fever that is above 101     2. If there is a large amount of drainage, bleeding, or swelling.   3. Severe pain that is not relieved by your prescription.   4. Drainage that is thick, cloudy, yellow, green or white.   5. Any other questions not answered by  Frequently Asked Questions  sheet.        FREQUENTLY ASKED QUESTIONS:    Q:  How should my incision look?    A:  Normally your incision will appear slightly swollen with light redness directly along the incision itself as it heals.  It may feel like a bump or ridge as the healing/scarring happens, and over time (3-4 months) this bump or ridge feeling should slowly go away.  In general, clear or pink watery drainage can be normal at first as your incision heals, but should decrease over time.    Q:  How do I know if my incision is infected?  A:  Look at your incision for signs of infection, like redness around the incision spreading to surrounding skin, or drainage of cloudy or foul-smelling drainage.  If you feel warm, check your temperature to see if you are running a fever.    **If any of these things occur, please notify the nurse at our office.  We may need you to come into the office for an incision check.      Q:  How do I take care of my  incision?  A:  If you have a dressing in place - Starting the day after surgery, replace the dressing 1-2 times a day until there is no further drainage from the incision.  At that time, a dressing is no longer needed.  Try to minimize tape on the skin if irritation is occurring at the tape sites.  If you have significant irritation from tape on the skin, please call the office to discuss other method of dressing your incision.    Small pieces of tape called  steri-strips  may be present directly overlying your incision; these may be removed 10 days after surgery unless otherwise specified by your surgeon.  If these tapes start to loosen at the ends, you may trim them back until they fall off or are removed.    A:  If you had  Dermabond  tissue glue used as a dressing (this causes your incision to look shiny with a clear covering over it) - This type of dressing wears off with time and does not require more dressings over the top unless it is draining around the glue as it wears off.  Do not apply ointments or lotions over the incisions until the glue has completely worn off.    Q:  There is a piece of tape or a sticky  lead  still on my skin.  Can I remove this?  A:  Sometimes the sticky  leads  used for monitoring during surgery or for evaluation in the emergency department are not all removed while you are in the hospital.  These sometimes have a tab or metal dot on them.  You can easily remove these on your own, like taking off a band-aid.  If there is a gel substance under the  lead , simply wipe/clean it off with a washcloth or paper towel.      Q:  What can I do to minimize constipation (very hard stools, or lack of stools)?  A:  Stay well hydrated.  Increase your dietary fiber intake or take a fiber supplement -with plenty of water.  Walk around frequently.  You may consider an over-the-counter stool-softener.  Your Pharmacist can assist you with choosing one that is stocked at your pharmacy.  Constipation  is also one of the most common side effects of pain medication.  If you are using pain medication, be pro-active and try to PREVENT problems with constipation by taking the steps above BEFORE constipation becomes a problem.    Q:  What do I do if I need more pain medications?  A:  Call the office to receive refills.  Be aware that certain pain meds cannot be called into a pharmacy and actually require a paper prescription.  A change may be made in your pain med as you progress thru your recovery period or if you have side effects to certain meds.    --Pain meds are NOT refilled after 5pm on weekdays, and NOT AT ALL on the weekends, so please look ahead to prevent problems.      Q:  Why am I having a hard time sleeping now that I am at home?  A:  Many medications you receive while you are in the hospital can impact your sleep for a number of days after your surgery/hospitalization.  Decreased level of activity and naps during the day may also make sleeping at night difficult.  Try to minimize day-time naps, and get up frequently during the day to walk around your home during your recovery time.  Sleep aides may be of some help, but are not recommended for long-term use.      Q:  I am having some back discomfort.  What should I do?  A:  This may be related to certain positioning that was required for your surgery, extended periods of time in bed, or other changes in your overall activity level.  You may try ice, heat, acetaminophen, or ibuprofen to treat this temporarily.  Note that many pain medications have acetaminophen in them and would state this on the prescription bottle.  Be sure not to exceed the maximum of 4000mg per day of acetaminophen.     **If the pain you are having does not resolve, is severe, or is a flare of back pain you have had on other occasions prior to surgery, please contact your primary physician for further recommendations or for an appointment to be examined at their office.    Q:  Why am I  having headaches?  A:  Headaches can be caused by many things:  caffeine withdrawal, use of pain meds, dehydration, high blood pressure, lack of sleep, over-activity/exhaustion, flare-up of usual migraine headaches.  If you feel this is related to muscle tension (a band-like feeling around the head, or a pressure at the low-back of the head) you may try ice or heat to this area.  You may need to drink more fluids (try electrolyte drink like Gatorade), rest, or take your usual migraine medications.   **If your headaches do not resolve, worsen, are accompanied by other symptoms, or if your blood pressure is high, please call your primary physician for recommendation and/or examination.    Q:  I am unable to urinate.  What do I do?  A:  A small percentage of people can have difficulty urinating initially after surgery.  This includes being able to urinate only a very small amount at a time and feeling discomfort or pressure in the very low abdomen.  This is called  urinary retention , and is actually an urgent situation.  Proceed to your nearest Emergency department for evaluation (not an Urgent Care Center).  Sometimes the bladder does not work correctly after certain medications you receive during surgery, or related to certain procedures.  You may need to have a catheter placed until your bladder recovers.  When planning to go to an Emergency department, it may help to call the ER to let them know you are coming in for this problem after a surgery.  This may help you get in quicker to be evaluated.  **If you have symptoms of a urinary tract infection, please contact your primary physician for the proper evaluation and treatment.          If you have other questions, please call the office Monday thru Friday between 8am and 5pm to discuss with the nurse or physician assistant.  #[phone]    There is a surgeon ON CALL on weekday evenings and over the weekend in case of urgent need only, and may be contacted at  "the same number.    If you are having an emergency, call 911 or proceed to your nearest emergency department.                    Pending Results     Date and Time Order Name Status Description    2018 1107 Surgical pathology exam In process             Statement of Approval     Ordered          18 1206  I have reviewed and agree with all the recommendations and orders detailed in this document.  EFFECTIVE NOW     Approved and electronically signed by:  Asmita Ramos PA-C             Admission Information     Date & Time Provider Department Dept. Phone    2018 Clara Riddle MD [place] [address] [facility] [phone]      Your Vitals Were     Blood Pressure Pulse Temperature Respirations Height Weight    102/52 (BP Location: Right arm) 66 97  F (36.1  C) (Axillary) 16 1.676 m (5' 6\") 73.9 kg (163 lb)    Last Period Pulse Oximetry BMI (Body Mass Index)             2018 97% 26.31 kg/m2         [facility] [employer] [facility] Information     [facility] [employer] [facility] lets you send messages to your doctor, view your test results, renew your prescriptions, schedule appointments and more. To sign up, go to [email or web address] . Click on \"Log in\" on the left side of the screen, which will take you to the Welcome page. Then click on \"Sign up Now\" on the right side of the page.     You will be asked to enter the access code listed below, as well as some personal information. Please follow the directions to create your username and password.     Your access code is: [email or web address]  Expires: 2018 12:56 PM     Your access code will  in 90 days. If you need help or a new code, please call your [facility] clinic or [phone].        Care EveryWhere ID     This is your Care EveryWhere ID. This could be used by other organizations to access your [facility] medical records  [id number]        Equal Access to Services     BE SOMMER AH: Jadyn Rincon, yaquelin cotter, tricia xie " linda wigginslink mack'aan ah. So [facility] [phone].    ATENCIÓN: Si troyla anastasia, tiene a calderón disposición servicios gratuitos de asistencia lingüística. Rey al [phone].    We comply with applicable federal civil rights laws and [place] laws. We do not discriminate on the basis of race, color, national origin, age, disability, sex, sexual orientation, or gender identity.               Review of your medicines      START taking        Dose / Directions    oxyCODONE IR 5 MG tablet   Commonly known as:  ROXICODONE        Dose:  5-10 mg   Take 1-2 tablets (5-10 mg) by mouth every 4 hours as needed for moderate to severe pain   Quantity:  20 tablet   Refills:  0         CONTINUE these medicines which have NOT CHANGED        Dose / Directions    citalopram 20 MG tablet   Commonly known as:  celeXA   Used for:  Major depressive disorder, single episode, moderate (H)        TAKE 1 TABLET(20 MG) BY MOUTH AT BEDTIME   Quantity:  90 tablet   Refills:  3       triamcinolone 0.1 % cream   Commonly known as:  KENALOG   Used for:  Rash        Apply sparingly to affected area three times daily for 14 days.   Quantity:  15 g   Refills:  0       ZYRTEC ALLERGY PO        Dose:  5 mg   Take 5 mg by mouth daily   Refills:  0            Where to get your medicines      Some of these will need a paper prescription and others can be bought over the counter. Ask your nurse if you have questions.     Bring a paper prescription for each of these medications     oxyCODONE IR 5 MG tablet                Protect others around you: Learn how to safely use, store and throw away your medicines at www.disposemymeds.org.        Information about OPIOIDS     PRESCRIPTION OPIOIDS: WHAT YOU NEED TO KNOW   You have a prescription for an opioid (narcotic) pain medicine. Opioids can cause addiction. If you have a history of chemical dependency of any type, you are at a higher risk of becoming addicted to opioids. Only take this medicine after  all other options have been tried. Take it for as short a time and as few doses as possible.     Do not:    Drive. If you drive while taking these medicines, you could be arrested for driving under the influence (DUI).    Operate heavy machinery    Do any other dangerous activities while taking these medicines.     Drink any alcohol while taking these medicines.      Take with any other medicines that contain acetaminophen. Read all labels carefully. Look for the word  acetaminophen  or  Tylenol.  Ask your pharmacist if you have questions or are unsure.    Store your pills in a secure place, locked if possible. We will not replace any lost or stolen medicine. If you don t finish your medicine, please throw away (dispose) as directed by your pharmacist. The [place] [employer]ution [employer] has more information about safe disposal: https://www.pca.[facility].[place].us/living-green/managing-unwanted-medications    All opioids tend to cause constipation. Drink plenty of water and eat foods that have a lot of fiber, such as fruits, vegetables, prune juice, apple juice and high-fiber cereal. Take a laxative (Miralax, milk of magnesia, Colace, Senna) if you don t move your bowels at least every other day.              Medication List: This is a list of all your medications and when to take them. Check marks below indicate your daily home schedule. Keep this list as a reference.      Medications           Morning Afternoon Evening Bedtime As Needed    citalopram 20 MG tablet   Commonly known as:  celeXA   TAKE 1 TABLET(20 MG) BY MOUTH AT BEDTIME   Last time this was given:  20 mg on 5/30/2018  8:35 AM                                oxyCODONE IR 5 MG tablet   Commonly known as:  ROXICODONE   Take 1-2 tablets (5-10 mg) by mouth every 4 hours as needed for moderate to severe pain                                triamcinolone 0.1 % cream   Commonly known as:  KENALOG   Apply sparingly to affected area three times daily for 14 days.                                 ZYRTEC ALLERGY PO   Take 5 mg by mouth daily

## 2018-05-29 NOTE — ANESTHESIA PREPROCEDURE EVALUATION
Anesthesia Evaluation     . Pt has had prior anesthetic. Type: General and Regional    No history of anesthetic complications          ROS/MED HX    ENT/Pulmonary:  - neg pulmonary ROS     Neurologic:  - neg neurologic ROS     Cardiovascular:  - neg cardiovascular ROS   (+) ----. : . . . :. Irregular Heartbeat/Palpitations, .       METS/Exercise Tolerance:     Hematologic:  - neg hematologic  ROS       Musculoskeletal:  - neg musculoskeletal ROS       GI/Hepatic:  - neg GI/hepatic ROS       Renal/Genitourinary:  - ROS Renal section negative       Endo:  - neg endo ROS       Psychiatric:     (+) psychiatric history anxiety and depression      Infectious Disease:  - neg infectious disease ROS       Malignancy:   (+) Malignancy History of Breast  Breast CA Remission status post. BRCA mutation with high likelihood of developing breast cancer         Other:    - neg other ROS                 Physical Exam  Normal systems: cardiovascular, pulmonary and dental    Airway   Mallampati: II  TM distance: >3 FB  Neck ROM: full    Dental     Cardiovascular   Rhythm and rate: regular and normal      Pulmonary    breath sounds clear to auscultation    Other findings: Lab Test        03/21/17     05/18/16     09/06/15                       1645          0906          0759          WBC          10.0         5.1          3.8*          HGB          13.3         13.3         11.3*         MCV          88           89           88            PLT          242          188          130*           Lab Test        05/18/16     09/06/15     09/04/15                       0906          0759          2059          NA           142          143          137           POTASSIUM    4.3          4.1          3.6           CHLORIDE     108          110*         105           CO2          25           27           25            BUN          8            10           12            CR           0.65         0.61         0.61          ANIONGAP     9             6            7             SILVERIO          8.7          8.0*         9.3           [facility]          76           88           102*                                 Anesthesia Plan      History & Physical Review  History and physical reviewed and following examination; no interval change.    ASA Status:  2 .    NPO Status:  > 8 hours    Plan for General and LMA with Intravenous induction. Maintenance will be Balanced.    PONV prophylaxis:  Ondansetron (or other 5HT-3) and Dexamethasone or Solumedrol       Postoperative Care  Postoperative pain management:  IV analgesics, Oral pain medications and Multi-modal analgesia.      Consents  Anesthetic plan, risks, benefits and alternatives discussed with:  Patient.  Use of blood products discussed: No .   .                          .

## 2018-05-29 NOTE — BRIEF OP NOTE
[facility] Brief Operative Note    Pre-operative diagnosis: BRCA 1 Positive    Post-operative diagnosis acquired absence bilateral breasts     Procedure: Procedure(s):  Prophylactic Total Bilateral Mastectomies, Free Nipple Graft, Bilateral Tissue Expanders  - Wound Class: I-Clean   - Wound Class: I-Clean   Surgeon(s): Surgeon(s) and Role:  Panel 1:     * Babita Lin PA-C - Assisting     * Dayana Carter MD - Primary    Panel 2:     * Clara Riddle MD - Primary   Estimated blood loss: 40 mL    Specimens:   ID Type Source Tests Collected by Time Destination   A : Right Breast - stitch zev 12 O'clock  Tissue Breast, Right SURGICAL PATHOLOGY EXAM Dayana Carter MD 5/29/2018 11:07 AM    B : Left Breast- Stitch Zev 12:00 Tissue Breast, Left SURGICAL PATHOLOGY EXAM Dayana Carter MD 5/29/2018 12:08 PM       Findings: See op note.  Fill volume =100 cc bilaterally.

## 2018-05-29 NOTE — PROGRESS NOTES
"SPIRITUAL HEALTH SERVICES Progress Note  [facility] Pre-surgical     visited with Gayle prior to her surgery per an [facility] request. Gayle is having a mastectomy and requested a prayer before going into surgery. The nurse was the only other person present in the room at the time of our visit.    Gayle said she is a Protestant and has attended many different denominations in her life. She said, \"mostly I am a believer.\" She said a prayer would be helpful. A prayer was offered and welcomed.    Gayle is being admitted and would like a follow up visit. I will follow with her.      Lucie Eli  Chaplain Resident  [phone]  "

## 2018-05-29 NOTE — ANESTHESIA POSTPROCEDURE EVALUATION
Patient: Gayle Moya    Procedure(s):  Prophylactic Total Bilateral Mastectomies, Free Nipple Graft, Bilateral Tissue Expanders  - Wound Class: I-Clean   - Wound Class: I-Clean    Diagnosis:BRCA 1 Positive   Diagnosis Additional Information: BRCA gene mutation  Dr. Raul Riddle    Anesthesia Type:  General, LMA    Note:  Anesthesia Post Evaluation    Patient location during evaluation: PACU  Patient participation: Able to fully participate in evaluation  Level of consciousness: awake  Pain management: adequate  Airway patency: patent  Cardiovascular status: acceptable  Respiratory status: acceptable  Hydration status: acceptable  PONV: none             Last vitals:  Vitals:    05/29/18 0822 05/29/18 1432   BP: 135/82 109/67   Pulse: 75    Resp: 18    Temp: 98.2  F (36.8  C)    SpO2: 98% 92%         Electronically Signed By: Chico Brown MD  May 29, 2018  2:42 PM

## 2018-05-30 VITALS
RESPIRATION RATE: 16 BRPM | BODY MASS INDEX: 26.2 KG/M2 | DIASTOLIC BLOOD PRESSURE: 52 MMHG | OXYGEN SATURATION: 97 % | HEART RATE: 66 BPM | HEIGHT: 66 IN | TEMPERATURE: 97 F | SYSTOLIC BLOOD PRESSURE: 102 MMHG | WEIGHT: 163 LBS

## 2018-05-30 LAB
COPATH REPORT: NORMAL
INTERPRETATION ECG - MUSE: NORMAL

## 2018-05-30 PROCEDURE — 25000128 H RX IP 250 OP 636: Performed by: PLASTIC SURGERY

## 2018-05-30 PROCEDURE — 25000132 ZZH RX MED GY IP 250 OP 250 PS 637: Performed by: PLASTIC SURGERY

## 2018-05-30 RX ORDER — OXYCODONE HYDROCHLORIDE 5 MG/1
5-10 TABLET ORAL EVERY 4 HOURS PRN
Qty: 20 TABLET | Refills: 0 | Status: ON HOLD | OUTPATIENT
Start: 2018-05-30 | End: 2018-06-22

## 2018-05-30 RX ORDER — OXYCODONE HYDROCHLORIDE 5 MG/1
5-10 TABLET ORAL EVERY 4 HOURS PRN
Status: DISCONTINUED | OUTPATIENT
Start: 2018-05-30 | End: 2018-05-30 | Stop reason: HOSPADM

## 2018-05-30 RX ORDER — OXYCODONE HYDROCHLORIDE 5 MG/1
5-10 TABLET ORAL EVERY 4 HOURS PRN
Qty: 6 TABLET | Refills: 0 | Status: SHIPPED | OUTPATIENT
Start: 2018-05-30 | End: 2018-05-30

## 2018-05-30 RX ADMIN — HYDROCODONE BITARTRATE AND ACETAMINOPHEN 1 TABLET: 5; 325 TABLET ORAL at 05:17

## 2018-05-30 RX ADMIN — HYDROCODONE BITARTRATE AND ACETAMINOPHEN 1 TABLET: 5; 325 TABLET ORAL at 06:14

## 2018-05-30 RX ADMIN — HYDROCODONE BITARTRATE AND ACETAMINOPHEN 2 TABLET: 5; 325 TABLET ORAL at 10:27

## 2018-05-30 RX ADMIN — SODIUM CHLORIDE, POTASSIUM CHLORIDE, SODIUM LACTATE AND CALCIUM CHLORIDE: 600; 310; 30; 20 INJECTION, SOLUTION INTRAVENOUS at 00:57

## 2018-05-30 RX ADMIN — IBUPROFEN 600 MG: 600 TABLET ORAL at 08:35

## 2018-05-30 RX ADMIN — HYDROMORPHONE HYDROCHLORIDE 0.2 MG: 1 INJECTION, SOLUTION INTRAMUSCULAR; INTRAVENOUS; SUBCUTANEOUS at 08:35

## 2018-05-30 RX ADMIN — HYDROMORPHONE HYDROCHLORIDE 0.2 MG: 1 INJECTION, SOLUTION INTRAMUSCULAR; INTRAVENOUS; SUBCUTANEOUS at 00:52

## 2018-05-30 RX ADMIN — CITALOPRAM HYDROBROMIDE 20 MG: 20 TABLET ORAL at 08:35

## 2018-05-30 ASSESSMENT — PAIN DESCRIPTION - DESCRIPTORS: DESCRIPTORS: SORE

## 2018-05-30 NOTE — PLAN OF CARE
Problem: Patient Care Overview  Goal: Plan of Care/Patient Progress Review  Outcome: Improving  POD 0 from bilateral total mastectomy.  R USHA 15mL output, L USHA 10mL output, ace bandage in place, CDI.   Low grade temp 99.2, soft BP's, last check 104/50, on RA. Denies any nausea.   Capnography in place.   IV Dilaudid x 1 and Norco x 1   Up with assist x 1 with gait belt, ambulated to bathroom and in hallway x 1.   Voiding without difficulty.   PIV /hr.

## 2018-05-30 NOTE — PLAN OF CARE
Problem: Breast Surgery/Reconstruction (Adult)  Goal: Signs and Symptoms of Listed Potential Problems Will be Absent, Minimized or Managed (Breast Surgery/Reconstruction)  Signs and symptoms of listed potential problems will be absent, minimized or managed by discharge/transition of care (reference Breast Surgery/Reconstruction (Adult) CPG).  Pt admitted for bilateral mastectomy. Patient able to independently care for drains and incisions after instruction given. Patient's brother, Glenroy, present at time of discharge. All discharge instructions explained, AVS reviewed. All questions answered. Filled prescription for oxycodone given to patient. All belongings packed and sent with patient. Stable at time of discharge.

## 2018-05-30 NOTE — PLAN OF CARE
PRIMARY DIAGNOSIS: Bilateral mastectomy, SNB with tissue expanders  OUTPATIENT/OBSERVATION GOALS TO BE MET BEFORE DISCHARGE:  1. Stable vital signs Yes  2. Tolerating diet:Yes  3. Pain controlled with oral pain medications:  No  4. Positive bowel sounds:  No  5. Voiding without difficulty:  No, due to void  6. Able to ambulate:  No, not out of bed yet  7. Provider specific discharge goals met:  No    Discharge Planner Nurse   Safe discharge environment identified: Yes  Barriers to discharge: No       Entered by: Colleen Crowley 05/29/2018 8:48 PM   Patient arrived at 1615 from PACU via cart.     Please review provider order for any additional goals.   Nurse to notify provider when observation goals have been met and patient is ready for discharge.

## 2018-05-30 NOTE — DISCHARGE INSTRUCTIONS
HOME CARE FOLLOWING MASTECTOMY  ALIDA Bains, CARLOS Bauer R. O Donnell    APPOINTMENT WITH YOUR SURGEON:  All patients are to schedule a post-op appointment with their general surgeon.  Once you are home, call the office to schedule the appointment for 2-3 weeks from the date of your surgery.  You may need to be seen sooner if you have a drain in place, especially if you are not being seen by plastic surgery.      Appointments to see your general surgeon at any of our locations can be made by calling:  #[phone].      If you have not yet received your final pathology report from your surgery, you will receive a phone call from your surgeon with these results.  You will be able to ask questions and receive more in-depth explanation at your post-op appointment.  This appointment will also be when you discuss further evaluation, treatment, and referral to oncology and/or radiation oncology if this is necessary.    DRAINS:  If you have a drain in place, you will need a separate appointment with a nurse in the office to have this removed.  You will have a form to keep track of the output of your drain.  When the output reaches a point where the total for a 24 hour period is less than 30cc s, you are ready to have the drain removed.  At that point you can call the office and talk to the nurse to arrange coming into the office to have this done.  If you have more than one drain in place, they may not all be removed at the same time, as the outputs can be very different from each.  The nurse will help you to manage this and help decide when the remaining drains will be taken out.    INCISIONAL CARE:  The incision:  The stitches at your incision dissolve over time.  The only stitches that need to be removed are those holding the drain in place.  This will be removed at the same time as the drain.  You may have steri-strips (thin strips of tape) or dermabond (a type of surgical glue that appears  as a shiny substance) on your incision.  Steri-strips may eventually start to peel up at the ends and fall off; otherwise they may be removed by you or your surgeon 10-14 days after your surgery.  Dermabond may eventually become flaky and wears off in the 2-4 weeks following surgery.      Drainage and bandages:  It is normal to have some drainage from the incisions initially after surgery (unless you have dermabond in place).  This amount should gradually decrease over time.  Incisions and drain sites should be covered with a bandage if there is any drainage present.  If none is present, a bandage is not required, but may still be applied if it is more comfortable to have one in place (example: clothing rubbing on incision).  After a drain has been removed, you may have drainage from the site.  It is recommended to keep a bandage over this site until all drainage has stopped.     BATHING:  You are allowed to bath (shallow water in a tub only) or shower 24-48 hours after your surgery.  Mild soap is OK to use near these sites.  When bathing, do not allow the incision or drain site to become submersed in water as this may increase the risk of infection.    DIET:  No restrictions.  Increased fluid intake is recommended. While taking pain medications, increase dietary fiber or add a fiber supplementation like Metamucil or Citrucel to help prevent constipation - a possible side effect of pain medications.    DISCOMFORT:  Begin taking pain pills before discomfort is severe.  When possible, take pain medication with food to minimize side effects.  Intermittent use of ice packs may help during the first 48 hours.  Expect gradual improvement.    ACTIVITY:  You may feel like resting more after surgery.  Rest when you feel it is needed.  You may climb stairs and do light activities or housework, but be careful not to overdo.  Minimize reaching overhead initially after surgery.  Pace yourself to allow a gradual increase in  activity.  Driving is NOT recommended until you feel comfortable and safe driving AND are off all narcotic pain medication.  You may resume sexual activity as soon as you feel comfortable doing so, but prevent any pressure on the incisional area.      If you had a  sentinel node biopsy  at the time of your surgery:  You have no restrictions in addition to those noted above.    If you had an  axillary node dissection  at the time of your surgery:  You are recommended to restrict the activity of the arm on the side the dissection was done on.  This means:  no reaching overhead until cleared to do so by your surgeon, no carrying weight on that side greater than a couple pounds, no injections/vaccinations/ blood samples from that side, and no blood pressure measurements on that side.  It is also recommended to elevate the arm on pillows several times a day to decrease/minimize swelling, and avoid sleeping on that arm.    PROSTHESIS/RECONSTRUCTION:  Do not feel pressured to wear a prosthesis or undergo reconstruction if you would prefer not to.  It is definitely your choice and we will assist you in any way we can.    If you are unable to have reconstruction, or have chosen not to have reconstruction at this time, you may have a prosthesis:  You may get a special camisole in the hospital that also comes with filled inserts to use as a temporary prosthesis.  The inserts are adjustable in size (simply remove stuffing to size) and are completely optional for your use.  They are used in this setting as they do not put extra pressure on your incision.  The camisole has a Velcro pocket on each side that the insert would fit into.  This pocket is also a good spot to place your drain bulb and tubing, thereby minimizing the chance of the tubing  catching  on clothing, etc, as you move about.  After you are further along in the healing process, you will be given appropriate prescriptions for a prosthesis that will be  professionally fitted for you.    If you have had reconstructive surgery at the time of your mastectomy:  The above also applies regarding the camisole and temporary prosthesis.  See above.  The main difference for you is that your plastic surgeon will be following up with you regarding your incisions, removal of drains, and any post-op questions you have about healing.  Your general surgeon will still contact you about your final pathology report and see you back for a post-op appointment (see above), but this can wait until after all of your drains have been removed by your plastic surgeon.           CONTACT US IF THE FOLLOWING DEVELOPS:   1. A fever that is above 101     2. If there is a large amount of drainage, bleeding, or swelling.   3. Severe pain that is not relieved by your prescription.   4. Drainage that is thick, cloudy, yellow, green or white.   5. Any other questions not answered by  Frequently Asked Questions  sheet.        FREQUENTLY ASKED QUESTIONS:    Q:  How should my incision look?    A:  Normally your incision will appear slightly swollen with light redness directly along the incision itself as it heals.  It may feel like a bump or ridge as the healing/scarring happens, and over time (3-4 months) this bump or ridge feeling should slowly go away.  In general, clear or pink watery drainage can be normal at first as your incision heals, but should decrease over time.    Q:  How do I know if my incision is infected?  A:  Look at your incision for signs of infection, like redness around the incision spreading to surrounding skin, or drainage of cloudy or foul-smelling drainage.  If you feel warm, check your temperature to see if you are running a fever.    **If any of these things occur, please notify the nurse at our office.  We may need you to come into the office for an incision check.      Q:  How do I take care of my incision?  A:  If you have a dressing in place - Starting the day after  surgery, replace the dressing 1-2 times a day until there is no further drainage from the incision.  At that time, a dressing is no longer needed.  Try to minimize tape on the skin if irritation is occurring at the tape sites.  If you have significant irritation from tape on the skin, please call the office to discuss other method of dressing your incision.    Small pieces of tape called  steri-strips  may be present directly overlying your incision; these may be removed 10 days after surgery unless otherwise specified by your surgeon.  If these tapes start to loosen at the ends, you may trim them back until they fall off or are removed.    A:  If you had  Dermabond  tissue glue used as a dressing (this causes your incision to look shiny with a clear covering over it) - This type of dressing wears off with time and does not require more dressings over the top unless it is draining around the glue as it wears off.  Do not apply ointments or lotions over the incisions until the glue has completely worn off.    Q:  There is a piece of tape or a sticky  lead  still on my skin.  Can I remove this?  A:  Sometimes the sticky  leads  used for monitoring during surgery or for evaluation in the emergency department are not all removed while you are in the hospital.  These sometimes have a tab or metal dot on them.  You can easily remove these on your own, like taking off a band-aid.  If there is a gel substance under the  lead , simply wipe/clean it off with a washcloth or paper towel.      Q:  What can I do to minimize constipation (very hard stools, or lack of stools)?  A:  Stay well hydrated.  Increase your dietary fiber intake or take a fiber supplement -with plenty of water.  Walk around frequently.  You may consider an over-the-counter stool-softener.  Your Pharmacist can assist you with choosing one that is stocked at your pharmacy.  Constipation is also one of the most common side effects of pain medication.  If you  are using pain medication, be pro-active and try to PREVENT problems with constipation by taking the steps above BEFORE constipation becomes a problem.    Q:  What do I do if I need more pain medications?  A:  Call the office to receive refills.  Be aware that certain pain meds cannot be called into a pharmacy and actually require a paper prescription.  A change may be made in your pain med as you progress thru your recovery period or if you have side effects to certain meds.    --Pain meds are NOT refilled after 5pm on weekdays, and NOT AT ALL on the weekends, so please look ahead to prevent problems.      Q:  Why am I having a hard time sleeping now that I am at home?  A:  Many medications you receive while you are in the hospital can impact your sleep for a number of days after your surgery/hospitalization.  Decreased level of activity and naps during the day may also make sleeping at night difficult.  Try to minimize day-time naps, and get up frequently during the day to walk around your home during your recovery time.  Sleep aides may be of some help, but are not recommended for long-term use.      Q:  I am having some back discomfort.  What should I do?  A:  This may be related to certain positioning that was required for your surgery, extended periods of time in bed, or other changes in your overall activity level.  You may try ice, heat, acetaminophen, or ibuprofen to treat this temporarily.  Note that many pain medications have acetaminophen in them and would state this on the prescription bottle.  Be sure not to exceed the maximum of 4000mg per day of acetaminophen.     **If the pain you are having does not resolve, is severe, or is a flare of back pain you have had on other occasions prior to surgery, please contact your primary physician for further recommendations or for an appointment to be examined at their office.    Q:  Why am I having headaches?  A:  Headaches can be caused by many things:   caffeine withdrawal, use of pain meds, dehydration, high blood pressure, lack of sleep, over-activity/exhaustion, flare-up of usual migraine headaches.  If you feel this is related to muscle tension (a band-like feeling around the head, or a pressure at the low-back of the head) you may try ice or heat to this area.  You may need to drink more fluids (try electrolyte drink like Gatorade), rest, or take your usual migraine medications.   **If your headaches do not resolve, worsen, are accompanied by other symptoms, or if your blood pressure is high, please call your primary physician for recommendation and/or examination.    Q:  I am unable to urinate.  What do I do?  A:  A small percentage of people can have difficulty urinating initially after surgery.  This includes being able to urinate only a very small amount at a time and feeling discomfort or pressure in the very low abdomen.  This is called  urinary retention , and is actually an urgent situation.  Proceed to your nearest Emergency department for evaluation (not an Urgent Care Center).  Sometimes the bladder does not work correctly after certain medications you receive during surgery, or related to certain procedures.  You may need to have a catheter placed until your bladder recovers.  When planning to go to an Emergency department, it may help to call the ER to let them know you are coming in for this problem after a surgery.  This may help you get in quicker to be evaluated.  **If you have symptoms of a urinary tract infection, please contact your primary physician for the proper evaluation and treatment.          If you have other questions, please call the office Monday thru Friday between 8am and 5pm to discuss with the nurse or physician assistant.  #[phone]    There is a surgeon ON CALL on weekday evenings and over the weekend in case of urgent need only, and may be contacted at the same number.    If you are having an emergency, call 911 or  proceed to your nearest emergency department.

## 2018-05-30 NOTE — PROGRESS NOTES
"[facility]  General Surgery Progress Note         Assessment and Plan:   Assessment:   POD#1 s/p Procedure(s):  Bilateral prophylactic total mastectomies, free nipple graft, bilateral tissue expanders  - Wound Class: I-Clean   - Wound Class: I-Clean        Plan:   -DC home today after drain teaching  -Rx Oxycodone.  Pt has Ibuprofen, Tylenol and Senokot at home  -f/u Dr. Riddle 1 week and Dr. Carter 2-3 weeks         Interval History:   Feels well, pain controlled with PO meds.  Up walking in room ok, voiding ok.  Tolerating diet.  Feels ready for home.         Physical Exam:   Blood pressure 101/52, pulse 66, temperature 97  F (36.1  C), temperature source Axillary, resp. rate 16, height 1.676 m (5' 6\"), weight 73.9 kg (163 lb), last menstrual period 05/18/2018, SpO2 97 %, not currently breastfeeding.  NoneI/O last 3 completed shifts:  In: 4251 [I.V.:4251]  Out: 1895 [Urine:1230; Emesis/NG output:500; Drains:125; Blood:40]    Chest - flat, no seroma/hematoma.  Incisions - clean, dry, intact.  No erythema.  Drains - Narendra-Trinh - serosanguinous.  No leakage around sites.          Data:     Recent Labs   Lab Test  03/21/17   1645  05/18/16   0906  09/06/15   0759   HGB  13.3  13.3  11.3*       Asmita Ramos PA-C     The patient has been seen and examined by me.  I agree with the above assessment and plan.  Dayana Carter MD      "

## 2018-05-30 NOTE — PLAN OF CARE
PRIMARY DIAGNOSIS: Bilateral mastectomy, SNB with tissue expanders  OUTPATIENT/OBSERVATION GOALS TO BE MET BEFORE DISCHARGE:  1. Stable vital signs Yes  2. Tolerating diet:No, emesis x 1, IV zofran and comapzine given.  3. Pain controlled with oral pain medications:  No, IV Dilaudid  4. Positive bowel sounds:  yes, not passing flatus.  5. Voiding without difficulty:  Yes, voided 300cc, clear yellow urine  6. Able to ambulate:  Yes, ambulated to/from bathroom.  7. Provider specific discharge goals met:  No    Discharge Planner Nurse   Safe discharge environment identified: Yes  Barriers to discharge: Yes       Entered by: Colleen Crowley 05/29/2018 8:50 PM     Please review provider order for any additional goals.   Nurse to notify provider when observation goals have been met and patient is ready for discharge.

## 2018-05-31 ENCOUNTER — TELEPHONE (OUTPATIENT)
Dept: SURGERY | Facility: CLINIC | Age: 38
End: 2018-05-31

## 2018-05-31 NOTE — TELEPHONE ENCOUNTER
"  GENERAL SURGERY NURSE PHONE TRIAGE   Gayle Moya    MRN# [id number]  AGE:  38 year old  YOB: 1980  [phone] (home) none      Surgeon: Dr. Carter  Surgical Assist:  Babita Lin PA-C     Surgery type: Bilateral prophylactic total mastectomies     Surgery Date: May / 29 / 2018     POD: 2     CHIEF CONCERN:  Abdominal discomfort     HISTORY OF PRESENT ILLNESS:     ABDOMINAL Discomfort  Location: RUQ and RLQ  Radiation: back:  Right  Patient is wondering if this is \"normal post op\"    Do not usually have patients with bilateral mastectomies C/o right abdominal pain.    Patient had reconstructive surgery with Dr. Wood    PLAN:   Patient will call Dr. Wood's clinic- phone number given to patient.  Pt is in agreement with this plan.  Jacqueline Arias RN on 5/31/2018 at 12:43 PM        "

## 2018-06-11 DIAGNOSIS — F32.1 MAJOR DEPRESSIVE DISORDER, SINGLE EPISODE, MODERATE (H): ICD-10-CM

## 2018-06-14 RX ORDER — CITALOPRAM HYDROBROMIDE 20 MG/1
TABLET ORAL
Qty: 90 TABLET | Refills: 0 | Status: ON HOLD | OUTPATIENT
Start: 2018-06-14 | End: 2018-06-22

## 2018-06-22 ENCOUNTER — SURGERY (OUTPATIENT)
Age: 38
End: 2018-06-22

## 2018-06-22 ENCOUNTER — ANESTHESIA (OUTPATIENT)
Dept: SURGERY | Facility: CLINIC | Age: 38
End: 2018-06-22
Payer: COMMERCIAL

## 2018-06-22 ENCOUNTER — ANESTHESIA EVENT (OUTPATIENT)
Dept: SURGERY | Facility: CLINIC | Age: 38
End: 2018-06-22
Payer: COMMERCIAL

## 2018-06-22 ENCOUNTER — HOSPITAL ENCOUNTER (OUTPATIENT)
Facility: CLINIC | Age: 38
Discharge: HOME | End: 2018-06-22
Attending: PLASTIC SURGERY | Admitting: PLASTIC SURGERY
Payer: COMMERCIAL

## 2018-06-22 VITALS
DIASTOLIC BLOOD PRESSURE: 77 MMHG | OXYGEN SATURATION: 99 % | SYSTOLIC BLOOD PRESSURE: 122 MMHG | RESPIRATION RATE: 16 BRPM | TEMPERATURE: 98.4 F

## 2018-06-22 DIAGNOSIS — Z90.13 ACQUIRED ABSENCE OF BOTH BREASTS AND NIPPLES: Primary | ICD-10-CM

## 2018-06-22 LAB — HCG UR QL: NEGATIVE

## 2018-06-22 PROCEDURE — 25000128 H RX IP 250 OP 636: Performed by: PLASTIC SURGERY

## 2018-06-22 PROCEDURE — 71000012 ZZH RECOVERY PHASE 1 LEVEL 1 FIRST HR: Performed by: PLASTIC SURGERY

## 2018-06-22 PROCEDURE — 25000128 H RX IP 250 OP 636: Performed by: ANESTHESIOLOGY

## 2018-06-22 PROCEDURE — 81025 URINE PREGNANCY TEST: CPT | Performed by: ANESTHESIOLOGY

## 2018-06-22 PROCEDURE — 25000128 H RX IP 250 OP 636: Performed by: NURSE ANESTHETIST, CERTIFIED REGISTERED

## 2018-06-22 PROCEDURE — 40000170 ZZH STATISTIC PRE-PROCEDURE ASSESSMENT II: Performed by: PLASTIC SURGERY

## 2018-06-22 PROCEDURE — 71000013 ZZH RECOVERY PHASE 1 LEVEL 1 EA ADDTL HR: Performed by: PLASTIC SURGERY

## 2018-06-22 PROCEDURE — 37000009 ZZH ANESTHESIA TECHNICAL FEE, EACH ADDTL 15 MIN: Performed by: PLASTIC SURGERY

## 2018-06-22 PROCEDURE — 36000056 ZZH SURGERY LEVEL 3 1ST 30 MIN: Performed by: PLASTIC SURGERY

## 2018-06-22 PROCEDURE — 27210995 ZZH RX 272: Performed by: PLASTIC SURGERY

## 2018-06-22 PROCEDURE — 27210794 ZZH OR GENERAL SUPPLY STERILE: Performed by: PLASTIC SURGERY

## 2018-06-22 PROCEDURE — 25000125 ZZHC RX 250: Performed by: NURSE ANESTHETIST, CERTIFIED REGISTERED

## 2018-06-22 PROCEDURE — 71000027 ZZH RECOVERY PHASE 2 EACH 15 MINS: Performed by: PLASTIC SURGERY

## 2018-06-22 PROCEDURE — 25000566 ZZH SEVOFLURANE, EA 15 MIN: Performed by: PLASTIC SURGERY

## 2018-06-22 PROCEDURE — 25000125 ZZHC RX 250: Performed by: ANESTHESIOLOGY

## 2018-06-22 PROCEDURE — 25000132 ZZH RX MED GY IP 250 OP 250 PS 637: Performed by: PLASTIC SURGERY

## 2018-06-22 PROCEDURE — 37000008 ZZH ANESTHESIA TECHNICAL FEE, 1ST 30 MIN: Performed by: PLASTIC SURGERY

## 2018-06-22 PROCEDURE — 36000058 ZZH SURGERY LEVEL 3 EA 15 ADDTL MIN: Performed by: PLASTIC SURGERY

## 2018-06-22 RX ORDER — SODIUM CHLORIDE, SODIUM LACTATE, POTASSIUM CHLORIDE, CALCIUM CHLORIDE 600; 310; 30; 20 MG/100ML; MG/100ML; MG/100ML; MG/100ML
INJECTION, SOLUTION INTRAVENOUS CONTINUOUS
Status: DISCONTINUED | OUTPATIENT
Start: 2018-06-22 | End: 2018-06-22 | Stop reason: HOSPADM

## 2018-06-22 RX ORDER — FENTANYL CITRATE 50 UG/ML
INJECTION, SOLUTION INTRAMUSCULAR; INTRAVENOUS PRN
Status: DISCONTINUED | OUTPATIENT
Start: 2018-06-22 | End: 2018-06-22

## 2018-06-22 RX ORDER — KETOROLAC TROMETHAMINE 30 MG/ML
30 INJECTION, SOLUTION INTRAMUSCULAR; INTRAVENOUS EVERY 6 HOURS PRN
Status: DISCONTINUED | OUTPATIENT
Start: 2018-06-22 | End: 2018-06-22 | Stop reason: HOSPADM

## 2018-06-22 RX ORDER — PROPOFOL 10 MG/ML
INJECTION, EMULSION INTRAVENOUS CONTINUOUS PRN
Status: DISCONTINUED | OUTPATIENT
Start: 2018-06-22 | End: 2018-06-22

## 2018-06-22 RX ORDER — EPHEDRINE SULFATE 50 MG/ML
INJECTION, SOLUTION INTRAMUSCULAR; INTRAVENOUS; SUBCUTANEOUS PRN
Status: DISCONTINUED | OUTPATIENT
Start: 2018-06-22 | End: 2018-06-22

## 2018-06-22 RX ORDER — FENTANYL CITRATE 50 UG/ML
25-50 INJECTION, SOLUTION INTRAMUSCULAR; INTRAVENOUS EVERY 5 MIN PRN
Status: DISCONTINUED | OUTPATIENT
Start: 2018-06-22 | End: 2018-06-22 | Stop reason: HOSPADM

## 2018-06-22 RX ORDER — ONDANSETRON 2 MG/ML
4 INJECTION INTRAMUSCULAR; INTRAVENOUS EVERY 30 MIN PRN
Status: DISCONTINUED | OUTPATIENT
Start: 2018-06-22 | End: 2018-06-22 | Stop reason: HOSPADM

## 2018-06-22 RX ORDER — MEPERIDINE HYDROCHLORIDE 25 MG/ML
12.5 INJECTION INTRAMUSCULAR; INTRAVENOUS; SUBCUTANEOUS
Status: DISCONTINUED | OUTPATIENT
Start: 2018-06-22 | End: 2018-06-22 | Stop reason: HOSPADM

## 2018-06-22 RX ORDER — ONDANSETRON 4 MG/1
4 TABLET, ORALLY DISINTEGRATING ORAL EVERY 30 MIN PRN
Status: DISCONTINUED | OUTPATIENT
Start: 2018-06-22 | End: 2018-06-22 | Stop reason: HOSPADM

## 2018-06-22 RX ORDER — DIMENHYDRINATE 50 MG/ML
12.5 INJECTION, SOLUTION INTRAMUSCULAR; INTRAVENOUS
Status: DISCONTINUED | OUTPATIENT
Start: 2018-06-22 | End: 2018-06-22 | Stop reason: HOSPADM

## 2018-06-22 RX ORDER — ONDANSETRON 2 MG/ML
INJECTION INTRAMUSCULAR; INTRAVENOUS PRN
Status: DISCONTINUED | OUTPATIENT
Start: 2018-06-22 | End: 2018-06-22

## 2018-06-22 RX ORDER — ALBUTEROL SULFATE 0.83 MG/ML
2.5 SOLUTION RESPIRATORY (INHALATION)
Status: DISCONTINUED | OUTPATIENT
Start: 2018-06-22 | End: 2018-06-22 | Stop reason: HOSPADM

## 2018-06-22 RX ORDER — MAGNESIUM HYDROXIDE 1200 MG/15ML
LIQUID ORAL PRN
Status: DISCONTINUED | OUTPATIENT
Start: 2018-06-22 | End: 2018-06-22 | Stop reason: HOSPADM

## 2018-06-22 RX ORDER — ACETAMINOPHEN 500 MG
1000 TABLET ORAL ONCE
Status: COMPLETED | OUTPATIENT
Start: 2018-06-22 | End: 2018-06-22

## 2018-06-22 RX ORDER — OXYCODONE HYDROCHLORIDE 5 MG/1
5-10 TABLET ORAL EVERY 4 HOURS PRN
Qty: 10 TABLET | Refills: 0 | Status: SHIPPED | OUTPATIENT
Start: 2018-06-22 | End: 2018-11-09

## 2018-06-22 RX ORDER — PROPOFOL 10 MG/ML
INJECTION, EMULSION INTRAVENOUS PRN
Status: DISCONTINUED | OUTPATIENT
Start: 2018-06-22 | End: 2018-06-22

## 2018-06-22 RX ORDER — NALOXONE HYDROCHLORIDE 0.4 MG/ML
.1-.4 INJECTION, SOLUTION INTRAMUSCULAR; INTRAVENOUS; SUBCUTANEOUS
Status: DISCONTINUED | OUTPATIENT
Start: 2018-06-22 | End: 2018-06-22 | Stop reason: HOSPADM

## 2018-06-22 RX ORDER — DEXAMETHASONE SODIUM PHOSPHATE 4 MG/ML
INJECTION, SOLUTION INTRA-ARTICULAR; INTRALESIONAL; INTRAMUSCULAR; INTRAVENOUS; SOFT TISSUE PRN
Status: DISCONTINUED | OUTPATIENT
Start: 2018-06-22 | End: 2018-06-22

## 2018-06-22 RX ORDER — LORAZEPAM 2 MG/ML
.5-1 INJECTION INTRAMUSCULAR
Status: DISCONTINUED | OUTPATIENT
Start: 2018-06-22 | End: 2018-06-22 | Stop reason: HOSPADM

## 2018-06-22 RX ORDER — LIDOCAINE HYDROCHLORIDE 20 MG/ML
INJECTION, SOLUTION INFILTRATION; PERINEURAL PRN
Status: DISCONTINUED | OUTPATIENT
Start: 2018-06-22 | End: 2018-06-22

## 2018-06-22 RX ORDER — CEFAZOLIN SODIUM 1 G/3ML
1 INJECTION, POWDER, FOR SOLUTION INTRAMUSCULAR; INTRAVENOUS SEE ADMIN INSTRUCTIONS
Status: DISCONTINUED | OUTPATIENT
Start: 2018-06-22 | End: 2018-06-22 | Stop reason: HOSPADM

## 2018-06-22 RX ORDER — SCOLOPAMINE TRANSDERMAL SYSTEM 1 MG/1
1 PATCH, EXTENDED RELEASE TRANSDERMAL ONCE
Status: COMPLETED | OUTPATIENT
Start: 2018-06-22 | End: 2018-06-22

## 2018-06-22 RX ORDER — SODIUM CHLORIDE, SODIUM LACTATE, POTASSIUM CHLORIDE, CALCIUM CHLORIDE 600; 310; 30; 20 MG/100ML; MG/100ML; MG/100ML; MG/100ML
INJECTION, SOLUTION INTRAVENOUS CONTINUOUS PRN
Status: DISCONTINUED | OUTPATIENT
Start: 2018-06-22 | End: 2018-06-22

## 2018-06-22 RX ORDER — CEFAZOLIN SODIUM 2 G/100ML
2 INJECTION, SOLUTION INTRAVENOUS
Status: COMPLETED | OUTPATIENT
Start: 2018-06-22 | End: 2018-06-22

## 2018-06-22 RX ORDER — HYDROMORPHONE HYDROCHLORIDE 1 MG/ML
.3-.5 INJECTION, SOLUTION INTRAMUSCULAR; INTRAVENOUS; SUBCUTANEOUS EVERY 10 MIN PRN
Status: DISCONTINUED | OUTPATIENT
Start: 2018-06-22 | End: 2018-06-22 | Stop reason: HOSPADM

## 2018-06-22 RX ORDER — DEXAMETHASONE SODIUM PHOSPHATE 4 MG/ML
4 INJECTION, SOLUTION INTRA-ARTICULAR; INTRALESIONAL; INTRAMUSCULAR; INTRAVENOUS; SOFT TISSUE EVERY 10 MIN PRN
Status: DISCONTINUED | OUTPATIENT
Start: 2018-06-22 | End: 2018-06-22 | Stop reason: HOSPADM

## 2018-06-22 RX ADMIN — ONDANSETRON 4 MG: 2 INJECTION INTRAMUSCULAR; INTRAVENOUS at 14:00

## 2018-06-22 RX ADMIN — CEFAZOLIN SODIUM 2 G: 2 INJECTION, SOLUTION INTRAVENOUS at 13:22

## 2018-06-22 RX ADMIN — DEXAMETHASONE SODIUM PHOSPHATE 4 MG: 4 INJECTION, SOLUTION INTRA-ARTICULAR; INTRALESIONAL; INTRAMUSCULAR; INTRAVENOUS; SOFT TISSUE at 13:25

## 2018-06-22 RX ADMIN — MIDAZOLAM 2 MG: 1 INJECTION INTRAMUSCULAR; INTRAVENOUS at 13:14

## 2018-06-22 RX ADMIN — LIDOCAINE HYDROCHLORIDE 100 MG: 20 INJECTION, SOLUTION INFILTRATION; PERINEURAL at 13:17

## 2018-06-22 RX ADMIN — PROPOFOL 200 MG: 10 INJECTION, EMULSION INTRAVENOUS at 13:17

## 2018-06-22 RX ADMIN — Medication 10 MG: at 13:33

## 2018-06-22 RX ADMIN — SODIUM CHLORIDE, POTASSIUM CHLORIDE, SODIUM LACTATE AND CALCIUM CHLORIDE: 600; 310; 30; 20 INJECTION, SOLUTION INTRAVENOUS at 13:14

## 2018-06-22 RX ADMIN — PROPOFOL 30 MCG/KG/MIN: 10 INJECTION, EMULSION INTRAVENOUS at 13:17

## 2018-06-22 RX ADMIN — SODIUM CHLORIDE 1000 ML: 900 IRRIGANT IRRIGATION at 13:58

## 2018-06-22 RX ADMIN — FENTANYL CITRATE 50 MCG: 50 INJECTION INTRAMUSCULAR; INTRAVENOUS at 14:35

## 2018-06-22 RX ADMIN — FENTANYL CITRATE 50 MCG: 50 INJECTION, SOLUTION INTRAMUSCULAR; INTRAVENOUS at 13:24

## 2018-06-22 RX ADMIN — ACETAMINOPHEN 1000 MG: 500 TABLET, FILM COATED ORAL at 12:37

## 2018-06-22 RX ADMIN — SCOPALAMINE 1 PATCH: 1 PATCH, EXTENDED RELEASE TRANSDERMAL at 13:06

## 2018-06-22 ASSESSMENT — LIFESTYLE VARIABLES: TOBACCO_USE: 1

## 2018-06-22 NOTE — ANESTHESIA CARE TRANSFER NOTE
Patient: Gayle Moya    Procedure(s):  REVISION OF BILATERAL MASTECTOMY INCISIONS  - Wound Class: I-Clean    Diagnosis: STATUS POST BILATERAL MASTECTOMY   Diagnosis Additional Information: No value filed.    Anesthesia Type:   General, ETT     Note:  Airway :Face Mask  Patient transferred to:PACU  Comments: VSS on arrival to PACU. Alert and talking, on 8L SFM 02. Report given to RN before transfer of patient care.Handoff Report: Identifed the Patient, Identified the Reponsible Provider, Reviewed the pertinent medical history, Discussed the surgical course, Reviewed Intra-OP anesthesia mangement and issues during anesthesia, Set expectations for post-procedure period and Allowed opportunity for questions and acknowledgement of understanding      Vitals: (Last set prior to Anesthesia Care Transfer)    CRNA VITALS  6/22/2018 1341 - 6/22/2018 1422      6/22/2018             NIBP: 113/71    Pulse: 72    NIBP Mean: 79    SpO2: (!)  69 %    Resp Rate (set): 10                Electronically Signed By: PETRA Araiza CRNA  June 22, 2018  2:22 PM

## 2018-06-22 NOTE — DISCHARGE INSTRUCTIONS
SCOPALAMINE Patch placed behind left ear for nausea relief.  Remove the patch in 24-26 hours.  Dispose in trash and wash hands carefully.     Information for Patients Discharging with a Transderm Scopolamine Patch       Dry mouth is a common side effect.    Drowsiness is another common side effect especially when combined with pain medication.  Please avoid activities that require mental alertness such as driving a car or making important legal decisions.    Since Scopolamine can cause temporary dilation of the pupils and blurred vision if it comes in contact with the eyes; be sure to wash your hands thoroughly with soap and water immediately after handling the patch.   When you remove your patch, please stick it to a tissue or paper towel for disposal.      Remove the patch immediately and contact a physician in the unlikely event that you experience symptoms of acute glaucoma (pain and reddening of the eyes, accompanied by dilated pupils).  Remove the patch if you develop any difficulties urinating.  If you cannot urinate after removing your patch, please notify your surgeon.      Same Day Surgery Discharge Instructions for  Sedation and General Anesthesia       It's not unusual to feel dizzy, light-headed or faint for up to 24 hours after surgery or while taking pain medication.  If you have these symptoms: sit for a few minutes before standing and have someone assist you when you get up to walk or use the bathroom.      You should rest and relax for the next 24 hours. We recommend you make arrangements to have an adult stay with you for at least 24 hours after your discharge.  Avoid hazardous and strenuous activity.      DO NOT DRIVE any vehicle or operate mechanical equipment for 24 hours following the end of your surgery.  Even though you may feel normal, your reactions may be affected by the medication you have received.      Do not drink alcoholic beverages for 24 hours following surgery.       Slowly  progress to your regular diet as you feel able. It's not unusual to feel nauseated and/or vomit after receiving anesthesia.  If you develop these symptoms, drink clear liquids (apple juice, ginger ale, broth, 7-up, etc. ) until you feel better.  If your nausea and vomiting persists for 24 hours, please notify your surgeon.        All narcotic pain medications, along with inactivity and anesthesia, can cause constipation. Drinking plenty of liquids and increasing fiber intake will help.      For any questions of a medical nature, call your surgeon.      Do not make important decisions for 24 hours.      If you had general anesthesia, you may have a sore throat for a couple of days related to the breathing tube used during surgery.  You may use Cepacol lozenges to help with this discomfort.  If it worsens or if you develop a fever, contact your surgeon.       If you feel your pain is not well managed with the pain medications prescribed by your surgeon, please contact your surgeon's office to let them know so they can address your concerns.       Discharge Instructions following Breast Surgery  [facility]    Diet:   Resume diet as tolerated.  Drink plenty of fluids to prevent constipation.    Activity:   Gentle rotation & stretching of your arms/shoulders will prevent stiffness in joints   Increase activity gradually   No heavy lifting greater than 10-15 pounds & no strenuous activity until  approved by surgeon    Bathing/Incision Care:   You may shower as directed by surgeon   Pat incisions dry.  No lotions, powders or perfumes to incisions   Tape dressings (steri strips) will fall off in 7-10 days (if present)    What to expect:   A tingly or itchy sensation around the incision is a normal sign of healing   Some clear, pink drainage from incisions is normal.      Notify your surgeon for the following signs & symptoms:   Redness, warmth, or swelling of the incision    Foul smelling or  increased drainage   Chills or temperature greater than 101 F   Pain not controlled by pain medications

## 2018-06-22 NOTE — ANESTHESIA POSTPROCEDURE EVALUATION
Patient: Gayle Moya    Procedure(s):  REVISION OF BILATERAL MASTECTOMY INCISIONS  - Wound Class: I-Clean    Diagnosis:STATUS POST BILATERAL MASTECTOMY   Diagnosis Additional Information: No value filed.    Anesthesia Type:  General, ETT    Note:  Anesthesia Post Evaluation    Patient location during evaluation: PACU  Patient participation: Able to fully participate in evaluation  Level of consciousness: awake  Pain management: adequate  Airway patency: patent  Cardiovascular status: acceptable  Respiratory status: acceptable  Hydration status: acceptable  PONV: none     Anesthetic complications: None          Last vitals:  Vitals:    06/22/18 1414 06/22/18 1415 06/22/18 1430   BP: 118/67  119/68   Resp: 22 20 20   Temp: 36.5  C (97.7  F)  36.6  C (97.9  F)   SpO2: 100%  100%         Electronically Signed By: Izabel Bartholomew MD, MD  June 22, 2018  2:43 PM

## 2018-06-22 NOTE — IP AVS SNAPSHOT
MRN:[id number]                      After Visit Summary   6/22/2018    Gayle Moya    MRN: [id number]           Thank you!     Thank you for choosing [facility] for your care. Our goal is always to provide you with excellent care. Hearing back from our patients is one way we can continue to improve our services. Please take a few minutes to complete the written survey that you may receive in the mail after you visit with us. Thank you!        Patient Information     Date Of Birth          1980        About your hospital stay     You were admitted on:  June 22, 2018 [facility]    You were discharged on:  June 22, 2018        Reason for your hospital stay       Surgery.                  Who to Call     For medical emergencies, please call 911.  For non-urgent questions about your medical care, please call your primary care provider or clinic, [phone]  For questions related to your surgery, please call your surgery clinic        Attending Provider     Provider Clara Sung MD Plastic Surgery       Primary Care Provider Office Phone # Fax #    Yi Michael Gottlieb [phone]      After Care Instructions     Discharge Instructions       Follow up with Dr. Riddle in 1 weeks.            Wound care and dressings       Remove dressing 24-48 hours after surgery.  Then may shower with incisions uncovered. Apply Aquaphor to incisions daily.Wet-to-dry dressings to open wounds on each breast; change daily. May use OTC ibuprofen/tylenol for discomfort.                  Further instructions from your care team       SCOPALAMINE Patch placed behind left ear for nausea relief.  Remove the patch in 24-26 hours.  Dispose in trash and wash hands carefully.     Information for Patients Discharging with a Transderm Scopolamine Patch       Dry mouth is a common side effect.    Drowsiness is another common side effect  especially when combined with pain medication.  Please avoid activities that require mental alertness such as driving a car or making important legal decisions.    Since Scopolamine can cause temporary dilation of the pupils and blurred vision if it comes in contact with the eyes; be sure to wash your hands thoroughly with soap and water immediately after handling the patch.   When you remove your patch, please stick it to a tissue or paper towel for disposal.      Remove the patch immediately and contact a physician in the unlikely event that you experience symptoms of acute glaucoma (pain and reddening of the eyes, accompanied by dilated pupils).  Remove the patch if you develop any difficulties urinating.  If you cannot urinate after removing your patch, please notify your surgeon.      Same Day Surgery Discharge Instructions for  Sedation and General Anesthesia       It's not unusual to feel dizzy, light-headed or faint for up to 24 hours after surgery or while taking pain medication.  If you have these symptoms: sit for a few minutes before standing and have someone assist you when you get up to walk or use the bathroom.      You should rest and relax for the next 24 hours. We recommend you make arrangements to have an adult stay with you for at least 24 hours after your discharge.  Avoid hazardous and strenuous activity.      DO NOT DRIVE any vehicle or operate mechanical equipment for 24 hours following the end of your surgery.  Even though you may feel normal, your reactions may be affected by the medication you have received.      Do not drink alcoholic beverages for 24 hours following surgery.       Slowly progress to your regular diet as you feel able. It's not unusual to feel nauseated and/or vomit after receiving anesthesia.  If you develop these symptoms, drink clear liquids (apple juice, ginger ale, broth, 7-up, etc. ) until you feel better.  If your nausea and vomiting persists for 24 hours, please  notify your surgeon.        All narcotic pain medications, along with inactivity and anesthesia, can cause constipation. Drinking plenty of liquids and increasing fiber intake will help.      For any questions of a medical nature, call your surgeon.      Do not make important decisions for 24 hours.      If you had general anesthesia, you may have a sore throat for a couple of days related to the breathing tube used during surgery.  You may use Cepacol lozenges to help with this discomfort.  If it worsens or if you develop a fever, contact your surgeon.       If you feel your pain is not well managed with the pain medications prescribed by your surgeon, please contact your surgeon's office to let them know so they can address your concerns.       Discharge Instructions following Breast Surgery  [facility]    Diet:   Resume diet as tolerated.  Drink plenty of fluids to prevent constipation.    Activity:   Gentle rotation & stretching of your arms/shoulders will prevent stiffness in joints   Increase activity gradually   No heavy lifting greater than 10-15 pounds & no strenuous activity until  approved by surgeon    Bathing/Incision Care:   You may shower as directed by surgeon   Pat incisions dry.  No lotions, powders or perfumes to incisions   Tape dressings (steri strips) will fall off in 7-10 days (if present)    What to expect:   A tingly or itchy sensation around the incision is a normal sign of healing   Some clear, pink drainage from incisions is normal.      Notify your surgeon for the following signs & symptoms:   Redness, warmth, or swelling of the incision    Foul smelling or increased drainage   Chills or temperature greater than 101 F   Pain not controlled by pain medications    Pending Results     No orders found from 6/20/2018 to 6/23/2018.            Admission Information     Date & Time Provider Department Dept. Phone    6/22/2018 Clara Riddle MD [facility]  "PACU [phone]      Your Vitals Were     Blood Pressure Temperature Respirations Last Period Pulse Oximetry       117/73 98.4  F (36.9  C) 16 2018 99%       [employer] Information     [employer] lets you send messages to your doctor, view your test results, renew your prescriptions, schedule appointments and more. To sign up, go to [email or web address] . Click on \"Log in\" on the left side of the screen, which will take you to the Welcome page. Then click on \"Sign up Now\" on the right side of the page.     You will be asked to enter the access code listed below, as well as some personal information. Please follow the directions to create your username and password.     Your access code is: [email or web address]  Expires: 2018  2:41 PM     Your access code will  in 90 days. If you need help or a new code, please call your [facility] clinic or [phone].        Care EveryWhere ID     This is your Care EveryWhere ID. This could be used by other organizations to access your [facility] medical records  [id number]        Equal Access to Services     SUSHIL SOMMER : Hadii dhruv Rincon, wadaveda cyndee, qaybta kaaldion padilla, tricia marie . So [facility] [phone].    ATENCIÓN: Si habla español, tiene a calderón disposición servicios gratuitos de asistencia lingüística. Llame al [phone].    We comply with applicable federal civil rights laws and [place] laws. We do not discriminate on the basis of race, color, national origin, age, disability, sex, sexual orientation, or gender identity.               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        Dose:  5-10 mg   Take 1-2 tablets (5-10 mg) by mouth every 4 hours as needed for severe pain   Quantity:  10 tablet   Refills:  0         CONTINUE these medicines which have NOT CHANGED  "       Dose / Directions    CEPHALEXIN PO        Dose:  500 mg   Take 500 mg by mouth   Refills:  0       citalopram 20 MG tablet   Commonly known as:  celeXA   Used for:  Major depressive disorder, single episode, moderate (H)        TAKE 1 TABLET(20 MG) BY MOUTH AT BEDTIME   Quantity:  90 tablet   Refills:  3       triamcinolone 0.1 % cream   Commonly known as:  KENALOG   Used for:  Rash        Apply sparingly to affected area three times daily for 14 days.   Quantity:  15 g   Refills:  0       ZYRTEC ALLERGY PO        Dose:  5 mg   Take 5 mg by mouth daily   Refills:  0            Where to get your medicines      Some of these will need a paper prescription and others can be bought over the counter. Ask your nurse if you have questions.     Bring a paper prescription for each of these medications     oxyCODONE IR 5 MG tablet                Protect others around you: Learn how to safely use, store and throw away your medicines at www.disposemymeds.org.        ANTIBIOTIC INSTRUCTION     You've Been Prescribed an Antibiotic - Now What?  Your healthcare team thinks that you or your loved one might have an infection. Some infections can be treated with antibiotics, which are powerful, life-saving drugs. Like all medications, antibiotics have side effects and should only be used when necessary. There are some important things you should know about your antibiotic treatment.      Your healthcare team may run tests before you start taking an antibiotic.    Your team may take samples (e.g., from your blood, urine or other areas) to run tests to look for bacteria. These test can be important to determine if you need an antibiotic at all and, if you do, which antibiotic will work best.      Within a few days, your healthcare team might change or even stop your antibiotic.    Your team may start you on an antibiotic while they are working to find out what is making you sick.    Your team might change your antibiotic because  test results show that a different antibiotic would be better to treat your infection.    In some cases, once your team has more information, they learn that you do not need an antibiotic at all. They may find out that you don't have an infection, or that the antibiotic you're taking won't work against your infection. For example, an infection caused by a virus can't be treated with antibiotics. Staying on an antibiotic when you don't need it is more likely to be harmful than helpful.      You may experience side effects from your antibiotic.    Like all medications, antibiotics have side effects. Some of these can be serious.    Let you healthcare team know if you have any known allergies when you are admitted to the hospital.    One significant side effect of nearly all antibiotics is the risk of severe and sometimes deadly diarrhea caused by Clostridium difficile (C. Difficile). This occurs when a person takes antibiotics because some good germs are destroyed. Antibiotic use allows C. diificile to take over, putting patients at high risk for this serious infection.    As a patient or caregiver, it is important to understand your or your loved one's antibiotic treatment. It is especially important for caregivers to speak up when patients can't speak for themselves. Here are some important questions to ask your healthcare team.    What infection is this antibiotic treating and how do you know I have that infection?    What side effects might occur from this antibiotic?    How long will I need to take this antibiotic?    Is it safe to take this antibiotic with other medications or supplements (e.g., vitamins) that I am taking?     Are there any special directions I need to know about taking this antibiotic? For example, should I take it with food?    How will I be monitored to know whether my infection is responding to the antibiotic?    What tests may help to make sure the right antibiotic is prescribed for  me?      Information provided by:  www.cdc.gov/getsmart  U.S. Department of Health and Human Services  Centers for disease Control and Prevention  National Center for Emerging and Zoonotic Infectious Diseases  Division of Healthcare Quality Promotion        Information about OPIOIDS     PRESCRIPTION OPIOIDS: WHAT YOU NEED TO KNOW   We gave you an opioid (narcotic) pain medicine. It is important to manage your pain, but opioids are not always the best choice. You should first try all the other options your care team gave you. Take this medicine for as short a time (and as few doses) as possible.     These medicines have risks:    DO NOT drive when on new or higher doses of pain medicine. These medicines can affect your alertness and reaction times, and you could be arrested for driving under the influence (DUI). If you need to use opioids long-term, talk to your care team about driving.    DO NOT operate heave machinery    DO NOT do any other dangerous activities while taking these medicines.     DO NOT drink any alcohol while taking these medicines.      If the opioid prescribed includes acetaminophen, DO NOT take with any other medicines that contain acetaminophen. Read all labels carefully. Look for the word  acetaminophen  or  Tylenol.  Ask your pharmacist if you have questions or are unsure.    You can get addicted to pain medicines, especially if you have a history of addiction (chemical, alcohol or substance dependence). Talk to your care team about ways to reduce this risk.    Store your pills in a secure place, locked if possible. We will not replace any lost or stolen medicine. If you don t finish your medicine, please throw away (dispose) as directed by your pharmacist. The [place] Pollution Control Agency has more information about safe disposal: https://www.pca.state.[place]/living-green/managing-unwanted-medications.     All opioids tend to cause constipation. Drink plenty of water and eat foods that  have a lot of fiber, such as fruits, vegetables, prune juice, apple juice and high-fiber cereal. Take a laxative (Miralax, milk of magnesia, Colace, Senna) if you don t move your bowels at least every other day.              Medication List: This is a list of all your medications and when to take them. Check marks below indicate your daily home schedule. Keep this list as a reference.      Medications           Morning Afternoon Evening Bedtime As Needed    CEPHALEXIN PO   Take 500 mg by mouth                                citalopram 20 MG tablet   Commonly known as:  celeXA   TAKE 1 TABLET(20 MG) BY MOUTH AT BEDTIME                                oxyCODONE IR 5 MG tablet   Commonly known as:  ROXICODONE   Take 1-2 tablets (5-10 mg) by mouth every 4 hours as needed for severe pain                                triamcinolone 0.1 % cream   Commonly known as:  KENALOG   Apply sparingly to affected area three times daily for 14 days.                                ZYRTEC ALLERGY PO   Take 5 mg by mouth daily

## 2018-06-22 NOTE — IP AVS SNAPSHOT
[facility] [address]    [place] [address]    Phone:  [phone]                                       After Visit Summary   6/22/2018    Gayle Moya    MRN: [id number]           After Visit Summary Signature Page     I have received my discharge instructions, and my questions have been answered. I have discussed any challenges I see with this plan with the nurse or doctor.    ..........................................................................................................................................  Patient/Patient Representative Signature      ..........................................................................................................................................  Patient Representative Print Name and Relationship to Patient    ..................................................               ................................................  Date                                            Time    ..........................................................................................................................................  Reviewed by Signature/Title    ...................................................              ..............................................  Date                                                            Time

## 2018-06-24 NOTE — OP NOTE
PLASTIC SURGERY OPERATIVE NOTE  Patient Name:  Gayle Moya     Date of Service:  6/22/2018     PREOPERATIVE DIAGNOSES:   1.  STATUS POST BILATERAL MASTECTOMY    2.  Dehiscence of the mastectomy incisions with loss of free nipple graft bilaterally    POSTOPERATIVE DIAGNOSES:   1.  STATUS POST BILATERAL MASTECTOMY   2.  Dehiscence of the mastectomy incisions with loss of free nipple graft bilaterally revision bilateral mastectomy incisions with complex closure of 26 cm       SURGEON:  Clara Riddle MD   OR STAFF:  Circulator: Dontrell Urbina RN  Relief Circulator: Mike Pak RN  Scrub Person: Cara Walters; Nicky Christie; Kendra Carrizales     ANESTHESIA:  General     ESTIMATED BLOOD LOSS:  5 mL    SPECIMEN(S):  * No specimens in log *     PROCEDURES:   1.  Revision bilateral mastectomy incision with complex closure of 26 cm      DESCRIPTION OF PROCEDURE:  Patient was marked for incisions and taken to the operating room.  General anesthesia was administered.  The chest area was prepped and draped in a sterile manner.  100 cc of fluid was removed from each tissue expander. On the right side, the superior mastectomy flap was elevated and then advanced to allow for primary closure of the vertical limb and reclosure of the inframammary incision.  This was done with interrupted 3-0 Monocryl in the subcutaneous tissue followed by interrupted 4-0 nylon the skin.  The circular incision where the grafted nipple had been placed was reapproximated by using a pursestring suture of a 3-0 Stratisfix.  This closed the majority of the wound but the central part motion, approximate 1 cm, was left to heal secondarily.    A similar procedure was completed on the other side.  The mastectomy flaps were elevated and then advanced to allow for primary closure of the vertical limb and reclosure of the inframammary incision.  This was done with interrupted 3-0 Monocryl followed by an interrupted 4-0 nylon  suture.  The  nonviable nipple areolar complex was excised.  The surgical incision was reapproximated with a pursestring suture of 3-0 Stratisfix.  This allowed for majority of closure except for approximately 2 cm in the center.    The wound was then dressed with saline moistened gauze over the open areas followed by Xeroform Kerlix roll and Ace wrap.    IMPLANTS:  * No implants in log *    Clara Riddle MD  Plastic Surgery  [facility]  [phone] (office)

## 2018-11-09 ENCOUNTER — OFFICE VISIT (OUTPATIENT)
Dept: FAMILY MEDICINE | Facility: CLINIC | Age: 38
End: 2018-11-09
Payer: COMMERCIAL

## 2018-11-09 VITALS
DIASTOLIC BLOOD PRESSURE: 73 MMHG | WEIGHT: 166 LBS | HEART RATE: 70 BPM | BODY MASS INDEX: 26.68 KG/M2 | OXYGEN SATURATION: 98 % | HEIGHT: 66 IN | TEMPERATURE: 98.2 F | SYSTOLIC BLOOD PRESSURE: 111 MMHG

## 2018-11-09 DIAGNOSIS — Z15.01 BRCA GENE POSITIVE: ICD-10-CM

## 2018-11-09 DIAGNOSIS — Z90.13 ACQUIRED ABSENCE OF BOTH BREASTS AND NIPPLES: ICD-10-CM

## 2018-11-09 DIAGNOSIS — Z15.09 BRCA GENE POSITIVE: ICD-10-CM

## 2018-11-09 DIAGNOSIS — Z01.818 PREOP GENERAL PHYSICAL EXAM: Primary | ICD-10-CM

## 2018-11-09 LAB
BETA HCG QUAL IFA URINE: NEGATIVE
HGB BLD-MCNC: 12.8 G/DL (ref 11.7–15.7)

## 2018-11-09 PROCEDURE — 36415 COLL VENOUS BLD VENIPUNCTURE: CPT | Performed by: NURSE PRACTITIONER

## 2018-11-09 PROCEDURE — 99214 OFFICE O/P EST MOD 30 MIN: CPT | Performed by: NURSE PRACTITIONER

## 2018-11-09 PROCEDURE — 84703 CHORIONIC GONADOTROPIN ASSAY: CPT | Performed by: NURSE PRACTITIONER

## 2018-11-09 PROCEDURE — 85018 HEMOGLOBIN: CPT | Performed by: NURSE PRACTITIONER

## 2018-11-09 NOTE — PROGRESS NOTES
[place] [address] [address] [facility] [address]  [phone]  Dept: [phone]    PRE-OP EVALUATION:  Today's date: 2018    Gayle Moya (: 1980) presents for pre-operative evaluation assessment as requested by Dr. Riddle, Clara Matute MD.  She requires evaluation and anesthesia risk assessment prior to undergoing surgery/procedure for treatment of BILATERAL SECOND STAGE BREAST RECONSTRUCTION WITH SILICONE IMPLANT PLACEMENT .    Proposed Surgery/ Procedure: FAT GRAFTING FROM ABDOMEN TO BILATERAL BREAST  Date of Surgery/ Procedure: 18  Time of Surgery/ Procedure: 2:10 PM  Hospital/Surgical Facility: [facility]  Primary Physician: Yi Gottlieb  Type of Anesthesia Anticipated: General    Patient has a Health Care Directive or Living Will:  NO    1. NO - Do you have a history of heart attack, stroke, stent, bypass or surgery on an artery in the head, neck, heart or legs?  2. NO - Do you ever have any pain or discomfort in your chest?  3. NO - Do you have a history of  Heart Failure?  4. NO - Are you troubled by shortness of breath when: walking on the level, up a slight hill or at night?  5. NO - Do you currently have a cold, bronchitis or other respiratory infection?  6. NO - Do you have a cough, shortness of breath or wheezing?  7. NO - Do you sometimes get pains in the calves of your legs when you walk?  8. NO - Do you or anyone in your family have previous history of blood clots?  9. NO - Do you or does anyone in your family have a serious bleeding problem such as prolonged bleeding following surgeries or cuts?  10. NO - Have you ever had problems with anemia or been told to take iron pills?  11. NO - Have you had any abnormal blood loss such as black, tarry or bloody stools, or abnormal vaginal bleeding?  12. NO - Have you ever had a blood transfusion?  13. NO - Have you or any of your relatives ever had problems with anesthesia?  14. NO - Do you have sleep apnea,  excessive snoring or daytime drowsiness?  15. NO - Do you have any prosthetic heart valves?  16. NO - Do you have prosthetic joints?  17. NO - Is there any chance that you may be pregnant?      HPI:     HPI related to upcoming procedure: Prophylactic mastectomies May  with expanders; with revision early July; this is final stage     See problem list for active medical problems.  Problems all longstanding and stable, except as noted/documented.  See ROS for pertinent symptoms related to these conditions.                                                                                                                                                          .    MEDICAL HISTORY:     Patient Active Problem List    Diagnosis Date Noted     Acquired absence of both breasts and nipples 2018     Priority: Medium     BRCA gene positive 2016     Priority: Medium     Major depressive disorder, single episode, moderate (H) 2015     Priority: Medium     Viral meningitis 2015     Priority: Medium     Meningitis 2015     Priority: Medium      delivery delivered 03/15/2013     Priority: Medium     Supervision of normal pregnancy 2013     Priority: Medium      Past Medical History:   Diagnosis Date     Abnormal Pap smear     CRYO     BRCA gene mutation positive in female     BRCA 1     H/O viral meningitis      Irregular heart beat     past hx of irregular heart rate-episodes of ?v-tach,rare now, controlled with relaxtion and cough     Major depressive disorder, single episode, moderate (H)      MVP (mitral valve prolapse)     diagnosised at age 20     Sprained ankle      Past Surgical History:   Procedure Laterality Date      SECTION  3/15/2013    Procedure:  SECTION;   Section for failure to descend;  Surgeon: Avelina Steele MD;  Location: [facility] OR     GYN SURGERY      cryotherapy cervical      HYSTROSALINGOGRAM[       MASTECTOMY SIMPLE  "BILATERAL, SENTINEL NODE BILATERAL, COMBINED Bilateral 5/29/2018    Procedure: COMBINED MASTECTOMY SIMPLE BILATERAL, SENTINEL NODE BILATERAL;  Bilateral prophylactic total mastectomies, free nipple graft, bilateral tissue expanders ;  Surgeon: Dayana Carter MD;  Location: [facility] OR     RECONSTRUCT BREAST, INSERT TISSUE EXPANDER BILATERAL, COMBINED Bilateral 5/29/2018    Procedure: COMBINED RECONSTRUCT BREAST, INSERT TISSUE EXPANDER BILATERAL;;  Surgeon: Clara Riddle MD;  Location: [facility] OR     REVISE RECONSTRUCTED BREAST BILATERAL Bilateral 6/22/2018    Procedure: REVISE RECONSTRUCTED BREAST BILATERAL;  REVISION OF BILATERAL MASTECTOMY INCISIONS ;  Surgeon: Clara Riddle MD;  Location: [facility] OR     TONSILLECTOMY & ADENOIDECTOMY  2004     wisdom teeth removal       Current Outpatient Prescriptions   Medication Sig Dispense Refill     Cetirizine HCl (ZYRTEC ALLERGY PO) Take 5 mg by mouth daily        citalopram (CELEXA) 20 MG tablet TAKE 1 TABLET(20 MG) BY MOUTH AT BEDTIME 90 tablet 3     triamcinolone (KENALOG) 0.1 % cream Apply sparingly to affected area three times daily for 14 days. 15 g 0     OTC products: vitamin E supplement    Allergies   Allergen Reactions     Atenolol Other (See Comments)     \"sleeplessness; hallucinations\"      Latex Allergy: NO    Social History   Substance Use Topics     Smoking status: Former Smoker     Quit date: 5/14/2012     Smokeless tobacco: Never Used     Alcohol use Yes      Comment: 3-5 drinks/week     History   Drug Use No       REVIEW OF SYSTEMS:   CONSTITUTIONAL: NEGATIVE for fever, chills, change in weight  INTEGUMENTARY/SKIN: NEGATIVE for worrisome rashes, moles or lesions  EYES: NEGATIVE for vision changes or irritation  ENT/MOUTH: NEGATIVE for ear, mouth and throat problems  RESP: NEGATIVE for significant cough or SOB  CV: NEGATIVE for chest pain, palpitations or peripheral edema  GI: NEGATIVE for nausea, abdominal pain, heartburn, or change in bowel " "habits  : NEGATIVE for frequency, dysuria, or hematuria  MUSCULOSKELETAL: NEGATIVE for significant arthralgias or myalgia  NEURO: NEGATIVE for weakness, dizziness or paresthesias  ENDOCRINE: NEGATIVE for temperature intolerance, skin/hair changes  HEME: NEGATIVE for bleeding problems  PSYCHIATRIC: NEGATIVE for changes in mood or affect    EXAM:   /73 (BP Location: Left arm, Cuff Size: Adult Large)  Pulse 70  Temp 98.2  F (36.8  C) (Tympanic)  Ht 5' 6\" (1.676 m)  Wt 166 lb (75.3 kg)  SpO2 98%  Breastfeeding? No  BMI 26.79 kg/m2    GENERAL APPEARANCE: healthy, alert and no distress     EYES: EOMI, PERRL     HENT: ear canals and TM's normal and nose and mouth without ulcers or lesions     NECK: no adenopathy, no asymmetry, masses, or scars and thyroid normal to palpation     RESP: lungs clear to auscultation - no rales, rhonchi or wheezes     CV: regular rates and rhythm, normal S1 S2, no S3 or S4 and no murmur, click or rub     ABDOMEN:  soft, nontender, no HSM or masses and bowel sounds normal     MS: extremities normal- no gross deformities noted, no evidence of inflammation in joints, FROM in all extremities.     SKIN: no suspicious lesions or rashes     NEURO: Normal strength and tone, sensory exam grossly normal, mentation intact and speech normal     PSYCH: mentation appears normal. and affect normal/bright     LYMPHATICS: No cervical adenopathy    DIAGNOSTICS:   EKG: not required for non vascular surgery, no cardiac risks and age <65    Recent Labs   Lab Test  05/29/18   0856  03/21/17   1645  05/18/16   0906  09/06/15   0759   HGB   --   13.3  13.3  11.3*   PLT   --   242  188  130*   NA   --    --   142  143   POTASSIUM  3.8   --   4.3  4.1   CR  0.69   --   0.65  0.61      Results for orders placed or performed in visit on 11/09/18   Hemoglobin   Result Value Ref Range    Hemoglobin 12.8 11.7 - 15.7 g/dL   Beta HCG Qual, Urine - [facility] ([id number])   Result Value Ref Range    Beta " HCG Qual IFA Urine Negative NEG^Negative        IMPRESSION:   Reason for surgery/procedure: bilateral breast reconstruction with silicone breast implants and fat transfer   Diagnosis/reason for consult: pre op consult    The proposed surgical procedure is considered INTERMEDIATE risk.    REVISED CARDIAC RISK INDEX  The patient has the following serious cardiovascular risks for perioperative complications such as (MI, PE, VFib and 3  AV Block):  No serious cardiac risks  INTERPRETATION: 0 risks: Class I (very low risk - 0.4% complication rate)    The patient has the following additional risks for perioperative complications:  No identified additional risks      ICD-10-CM    1. Preop general physical exam Z01.818 Hemoglobin     Beta HCG Qual, Urine - [facility] ([id number])   2. Acquired absence of both breasts and nipples Z90.13    3. BRCA gene positive Z15.01     Z15.09        RECOMMENDATIONS:     --Patient is to take all scheduled medications on the day of surgery with sip of water    APPROVAL GIVEN to proceed with proposed procedure, without further diagnostic evaluation       Signed Electronically by: PETRA Cordon CNP    Copy of this evaluation report is provided to requesting physician.    [facility] Preop Guidelines    Revised Cardiac Risk Index

## 2018-11-09 NOTE — MR AVS SNAPSHOT
After Visit Summary   11/9/2018    Gayle Moya    MRN: [id number]           Patient Information     Date Of Birth          1980        Visit Information        Provider Department      11/9/2018 8:30 AM Molly Goel APRN CNP [facility]        Today's Diagnoses     Preop general physical exam    -  1    Acquired absence of both breasts and nipples        BRCA gene positive          Care Instructions      Before Your Surgery      Call your surgeon if there is any change in your health. This includes signs of a cold or flu (such as a sore throat, runny nose, cough, rash or fever).    Do not smoke, drink alcohol or take over the counter medicine (unless your surgeon or primary care doctor tells you to) for the 24 hours before and after surgery.    If you take prescribed drugs: Follow your doctor s orders about which medicines to take and which to stop until after surgery.    Eating and drinking prior to surgery: follow the instructions from your surgeon    Take a shower or bath the night before surgery. Use the soap your surgeon gave you to gently clean your skin. If you do not have soap from your surgeon, use your regular soap. Do not shave or scrub the surgery site.  Wear clean pajamas and have clean sheets on your bed.           Follow-ups after your visit        Your next 10 appointments already scheduled     Nov 14, 2018   Procedure with Clara Riddle MD   [facility] (--)    [address]  [facility] [id number] [phone]              Who to contact     If you have questions or need follow up information about today's clinic visit or your schedule please contact [facility] directly at [phone].  Normal or non-critical lab and imaging results will be communicated to you by MyChart, letter or phone within 4 business days after the clinic has received the results. If you do not hear from us within 7 days, please  "contact the clinic through [employer] or phone. If you have a critical or abnormal lab result, we will notify you by phone as soon as possible.  Submit refill requests through [employer] or call your pharmacy and they will forward the refill request to us. Please allow 3 business days for your refill to be completed.          Additional Information About Your Visit        CBLPathharReveal Information     [employer] lets you send messages to your doctor, view your test results, renew your prescriptions, schedule appointments and more. To sign up, go to [email or web address] . Click on \"Log in\" on the left side of the screen, which will take you to the Welcome page. Then click on \"Sign up Now\" on the right side of the page.     You will be asked to enter the access code listed below, as well as some personal information. Please follow the directions to create your username and password.     Your access code is: [email or web address]  Expires: 2019  2:58 PM     Your access code will  in 90 days. If you need help or a new code, please call your [place] clinic or [phone].        Care EveryWhere ID     This is your Care EveryWhere ID. This could be used by other organizations to access your [place] medical records  [id number]        Your Vitals Were     Pulse Temperature Height Pulse Oximetry Breastfeeding? BMI (Body Mass Index)    70 98.2  F (36.8  C) (Tympanic) 5' 6\" (1.676 m) 98% No 26.79 kg/m2       Blood Pressure from Last 3 Encounters:   18 111/73   18 122/77   18 102/52    Weight from Last 3 Encounters:   18 166 lb (75.3 kg)   18 163 lb (73.9 kg)   18 166 lb 6.4 oz (75.5 kg)              We Performed the Following     Beta HCG Qual, Urine - [facility] ([id number])     Hemoglobin        Primary Care Provider Office Phone # Fax #    Yi Gottlieb [phone]       [address]  [facility] [address]        Equal Access to Services     [facility] ROS AH: " Hadii dhruv fuo Sobreannali, waaxda luqadaha, qaybta kaalmada valerie, tricia genovevaerick garciamikhail pretty. So [facility] [phone].    ATENCIÓN: Si troyla anastasia, tiene a calderón disposición servicios gratuitos de asistencia lingüística. Llame al [phone].    We comply with applicable federal civil rights laws and [place] laws. We do not discriminate on the basis of race, color, national origin, age, disability, sex, sexual orientation, or gender identity.            Thank you!     Thank you for choosing [facility]  for your care. Our goal is always to provide you with excellent care. Hearing back from our patients is one way we can continue to improve our services. Please take a few minutes to complete the written survey that you may receive in the mail after your visit with us. Thank you!             Your Updated Medication List - Protect others around you: Learn how to safely use, store and throw away your medicines at www.disposemymeds.org.          This list is accurate as of 11/9/18 11:59 PM.  Always use your most recent med list.                   Brand Name Dispense Instructions for use Diagnosis    citalopram 20 MG tablet    celeXA    90 tablet    TAKE 1 TABLET(20 MG) BY MOUTH AT BEDTIME    Major depressive disorder, single episode, moderate (H)       triamcinolone 0.1 % cream    KENALOG    15 g    Apply sparingly to affected area three times daily for 14 days.    Rash       ZYRTEC ALLERGY PO      Take 5 mg by mouth daily

## 2018-11-12 NOTE — H&P (VIEW-ONLY)
[place] [address] [facility] [address]  [phone]  Dept: [phone]    PRE-OP EVALUATION:  Today's date: 2018    Gayle Moya (: 1980) presents for pre-operative evaluation assessment as requested by Dr. Riddle, Clara Matute MD.  She requires evaluation and anesthesia risk assessment prior to undergoing surgery/procedure for treatment of BILATERAL SECOND STAGE BREAST RECONSTRUCTION WITH SILICONE IMPLANT PLACEMENT .    Proposed Surgery/ Procedure: FAT GRAFTING FROM ABDOMEN TO BILATERAL BREAST  Date of Surgery/ Procedure: 18  Time of Surgery/ Procedure: 2:10 PM  Hospital/Surgical Facility: [facility]  Primary Physician: Yi Gottlieb  Type of Anesthesia Anticipated: General    Patient has a Health Care Directive or Living Will:  NO    1. NO - Do you have a history of heart attack, stroke, stent, bypass or surgery on an artery in the head, neck, heart or legs?  2. NO - Do you ever have any pain or discomfort in your chest?  3. NO - Do you have a history of  Heart Failure?  4. NO - Are you troubled by shortness of breath when: walking on the level, up a slight hill or at night?  5. NO - Do you currently have a cold, bronchitis or other respiratory infection?  6. NO - Do you have a cough, shortness of breath or wheezing?  7. NO - Do you sometimes get pains in the calves of your legs when you walk?  8. NO - Do you or anyone in your family have previous history of blood clots?  9. NO - Do you or does anyone in your family have a serious bleeding problem such as prolonged bleeding following surgeries or cuts?  10. NO - Have you ever had problems with anemia or been told to take iron pills?  11. NO - Have you had any abnormal blood loss such as black, tarry or bloody stools, or abnormal vaginal bleeding?  12. NO - Have you ever had a blood transfusion?  13. NO - Have you or any of your relatives ever had problems with anesthesia?  14. NO - Do you have sleep apnea,  excessive snoring or daytime drowsiness?  15. NO - Do you have any prosthetic heart valves?  16. NO - Do you have prosthetic joints?  17. NO - Is there any chance that you may be pregnant?      HPI:     HPI related to upcoming procedure: Prophylactic mastectomies May  with expanders; with revision early July; this is final stage     See problem list for active medical problems.  Problems all longstanding and stable, except as noted/documented.  See ROS for pertinent symptoms related to these conditions.                                                                                                                                                          .    MEDICAL HISTORY:     Patient Active Problem List    Diagnosis Date Noted     Acquired absence of both breasts and nipples 2018     Priority: Medium     BRCA gene positive 2016     Priority: Medium     Major depressive disorder, single episode, moderate (H) 2015     Priority: Medium     Viral meningitis 2015     Priority: Medium     Meningitis 2015     Priority: Medium      delivery delivered 03/15/2013     Priority: Medium     Supervision of normal pregnancy 2013     Priority: Medium      Past Medical History:   Diagnosis Date     Abnormal Pap smear     CRYO     BRCA gene mutation positive in female     BRCA 1     H/O viral meningitis      Irregular heart beat     past hx of irregular heart rate-episodes of ?v-tach,rare now, controlled with relaxtion and cough     Major depressive disorder, single episode, moderate (H)      MVP (mitral valve prolapse)     diagnosised at age 20     Sprained ankle      Past Surgical History:   Procedure Laterality Date      SECTION  3/15/2013    Procedure:  SECTION;   Section for failure to descend;  Surgeon: Avelina Steele MD;  Location: [facility] OR     GYN SURGERY      cryotherapy cervical      HYSTROSALINGOGRAM[       MASTECTOMY SIMPLE  "BILATERAL, SENTINEL NODE BILATERAL, COMBINED Bilateral 5/29/2018    Procedure: COMBINED MASTECTOMY SIMPLE BILATERAL, SENTINEL NODE BILATERAL;  Bilateral prophylactic total mastectomies, free nipple graft, bilateral tissue expanders ;  Surgeon: Dayana Carter MD;  Location: [facility] OR     RECONSTRUCT BREAST, INSERT TISSUE EXPANDER BILATERAL, COMBINED Bilateral 5/29/2018    Procedure: COMBINED RECONSTRUCT BREAST, INSERT TISSUE EXPANDER BILATERAL;;  Surgeon: Clara Riddle MD;  Location: [facility] OR     REVISE RECONSTRUCTED BREAST BILATERAL Bilateral 6/22/2018    Procedure: REVISE RECONSTRUCTED BREAST BILATERAL;  REVISION OF BILATERAL MASTECTOMY INCISIONS ;  Surgeon: Clara Riddle MD;  Location: [facility] OR     TONSILLECTOMY & ADENOIDECTOMY  2004     wisdom teeth removal       Current Outpatient Prescriptions   Medication Sig Dispense Refill     Cetirizine HCl (ZYRTEC ALLERGY PO) Take 5 mg by mouth daily        citalopram (CELEXA) 20 MG tablet TAKE 1 TABLET(20 MG) BY MOUTH AT BEDTIME 90 tablet 3     triamcinolone (KENALOG) 0.1 % cream Apply sparingly to affected area three times daily for 14 days. 15 g 0     OTC products: vitamin E supplement    Allergies   Allergen Reactions     Atenolol Other (See Comments)     \"sleeplessness; hallucinations\"      Latex Allergy: NO    Social History   Substance Use Topics     Smoking status: Former Smoker     Quit date: 5/14/2012     Smokeless tobacco: Never Used     Alcohol use Yes      Comment: 3-5 drinks/week     History   Drug Use No       REVIEW OF SYSTEMS:   CONSTITUTIONAL: NEGATIVE for fever, chills, change in weight  INTEGUMENTARY/SKIN: NEGATIVE for worrisome rashes, moles or lesions  EYES: NEGATIVE for vision changes or irritation  ENT/MOUTH: NEGATIVE for ear, mouth and throat problems  RESP: NEGATIVE for significant cough or SOB  CV: NEGATIVE for chest pain, palpitations or peripheral edema  GI: NEGATIVE for nausea, abdominal pain, heartburn, or change in bowel " "habits  : NEGATIVE for frequency, dysuria, or hematuria  MUSCULOSKELETAL: NEGATIVE for significant arthralgias or myalgia  NEURO: NEGATIVE for weakness, dizziness or paresthesias  ENDOCRINE: NEGATIVE for temperature intolerance, skin/hair changes  HEME: NEGATIVE for bleeding problems  PSYCHIATRIC: NEGATIVE for changes in mood or affect    EXAM:   /73 (BP Location: Left arm, Cuff Size: Adult Large)  Pulse 70  Temp 98.2  F (36.8  C) (Tympanic)  Ht 5' 6\" (1.676 m)  Wt 166 lb (75.3 kg)  SpO2 98%  Breastfeeding? No  BMI 26.79 kg/m2    GENERAL APPEARANCE: healthy, alert and no distress     EYES: EOMI, PERRL     HENT: ear canals and TM's normal and nose and mouth without ulcers or lesions     NECK: no adenopathy, no asymmetry, masses, or scars and thyroid normal to palpation     RESP: lungs clear to auscultation - no rales, rhonchi or wheezes     CV: regular rates and rhythm, normal S1 S2, no S3 or S4 and no murmur, click or rub     ABDOMEN:  soft, nontender, no HSM or masses and bowel sounds normal     MS: extremities normal- no gross deformities noted, no evidence of inflammation in joints, FROM in all extremities.     SKIN: no suspicious lesions or rashes     NEURO: Normal strength and tone, sensory exam grossly normal, mentation intact and speech normal     PSYCH: mentation appears normal. and affect normal/bright     LYMPHATICS: No cervical adenopathy    DIAGNOSTICS:   EKG: not required for non vascular surgery, no cardiac risks and age <65    Recent Labs   Lab Test  05/29/18   0856  03/21/17   1645  05/18/16   0906  09/06/15   0759   HGB   --   13.3  13.3  11.3*   PLT   --   242  188  130*   NA   --    --   142  143   POTASSIUM  3.8   --   4.3  4.1   CR  0.69   --   0.65  0.61      Results for orders placed or performed in visit on 11/09/18   Hemoglobin   Result Value Ref Range    Hemoglobin 12.8 11.7 - 15.7 g/dL   Beta HCG Qual, Urine - [facility] [place] ([id number])   Result Value Ref Range    Beta " HCG Qual IFA Urine Negative NEG^Negative        IMPRESSION:   Reason for surgery/procedure: bilateral breast reconstruction with silicone breast implants and fat transfer   Diagnosis/reason for consult: pre op consult    The proposed surgical procedure is considered INTERMEDIATE risk.    REVISED CARDIAC RISK INDEX  The patient has the following serious cardiovascular risks for perioperative complications such as (MI, PE, VFib and 3  AV Block):  No serious cardiac risks  INTERPRETATION: 0 risks: Class I (very low risk - 0.4% complication rate)    The patient has the following additional risks for perioperative complications:  No identified additional risks      ICD-10-CM    1. Preop general physical exam Z01.818 Hemoglobin     Beta HCG Qual, Urine - [facility] [place] ([id number])   2. Acquired absence of both breasts and nipples Z90.13    3. BRCA gene positive Z15.01     Z15.09        RECOMMENDATIONS:     --Patient is to take all scheduled medications on the day of surgery with sip of water    APPROVAL GIVEN to proceed with proposed procedure, without further diagnostic evaluation       Signed Electronically by: PETRA Cordon CNP    Copy of this evaluation report is provided to requesting physician.    [facility] Preop Guidelines    Revised Cardiac Risk Index

## 2018-11-14 ENCOUNTER — ANESTHESIA (OUTPATIENT)
Dept: SURGERY | Facility: CLINIC | Age: 38
End: 2018-11-14
Payer: COMMERCIAL

## 2018-11-14 ENCOUNTER — SURGERY (OUTPATIENT)
Age: 38
End: 2018-11-14

## 2018-11-14 ENCOUNTER — ANESTHESIA EVENT (OUTPATIENT)
Dept: SURGERY | Facility: CLINIC | Age: 38
End: 2018-11-14
Payer: COMMERCIAL

## 2018-11-14 ENCOUNTER — HOSPITAL ENCOUNTER (OUTPATIENT)
Facility: CLINIC | Age: 38
Discharge: HOME | End: 2018-11-14
Attending: PLASTIC SURGERY | Admitting: PLASTIC SURGERY
Payer: COMMERCIAL

## 2018-11-14 VITALS
OXYGEN SATURATION: 95 % | TEMPERATURE: 98.2 F | DIASTOLIC BLOOD PRESSURE: 77 MMHG | RESPIRATION RATE: 16 BRPM | SYSTOLIC BLOOD PRESSURE: 118 MMHG

## 2018-11-14 DIAGNOSIS — Z90.13 ACQUIRED ABSENCE OF BOTH BREASTS AND NIPPLES: Primary | ICD-10-CM

## 2018-11-14 LAB — HCG UR QL: NEGATIVE

## 2018-11-14 PROCEDURE — 36000058 ZZH SURGERY LEVEL 3 EA 15 ADDTL MIN: Performed by: PLASTIC SURGERY

## 2018-11-14 PROCEDURE — 25000132 ZZH RX MED GY IP 250 OP 250 PS 637: Performed by: ANESTHESIOLOGY

## 2018-11-14 PROCEDURE — 27210794 ZZH OR GENERAL SUPPLY STERILE: Performed by: PLASTIC SURGERY

## 2018-11-14 PROCEDURE — 25000128 H RX IP 250 OP 636: Performed by: PLASTIC SURGERY

## 2018-11-14 PROCEDURE — L8600 IMPLANT BREAST SILICONE/EQ: HCPCS | Performed by: PLASTIC SURGERY

## 2018-11-14 PROCEDURE — 71000027 ZZH RECOVERY PHASE 2 EACH 15 MINS: Performed by: PLASTIC SURGERY

## 2018-11-14 PROCEDURE — 71000013 ZZH RECOVERY PHASE 1 LEVEL 1 EA ADDTL HR: Performed by: PLASTIC SURGERY

## 2018-11-14 PROCEDURE — 37000008 ZZH ANESTHESIA TECHNICAL FEE, 1ST 30 MIN: Performed by: PLASTIC SURGERY

## 2018-11-14 PROCEDURE — 25000128 H RX IP 250 OP 636: Performed by: NURSE ANESTHETIST, CERTIFIED REGISTERED

## 2018-11-14 PROCEDURE — 25000125 ZZHC RX 250: Performed by: PLASTIC SURGERY

## 2018-11-14 PROCEDURE — 25000566 ZZH SEVOFLURANE, EA 15 MIN: Performed by: PLASTIC SURGERY

## 2018-11-14 PROCEDURE — 25000132 ZZH RX MED GY IP 250 OP 250 PS 637: Performed by: PLASTIC SURGERY

## 2018-11-14 PROCEDURE — 36000056 ZZH SURGERY LEVEL 3 1ST 30 MIN: Performed by: PLASTIC SURGERY

## 2018-11-14 PROCEDURE — 25000125 ZZHC RX 250: Performed by: NURSE ANESTHETIST, CERTIFIED REGISTERED

## 2018-11-14 PROCEDURE — 81025 URINE PREGNANCY TEST: CPT | Performed by: PLASTIC SURGERY

## 2018-11-14 PROCEDURE — 37000009 ZZH ANESTHESIA TECHNICAL FEE, EACH ADDTL 15 MIN: Performed by: PLASTIC SURGERY

## 2018-11-14 PROCEDURE — 71000012 ZZH RECOVERY PHASE 1 LEVEL 1 FIRST HR: Performed by: PLASTIC SURGERY

## 2018-11-14 PROCEDURE — 40000170 ZZH STATISTIC PRE-PROCEDURE ASSESSMENT II: Performed by: PLASTIC SURGERY

## 2018-11-14 PROCEDURE — 25000128 H RX IP 250 OP 636: Performed by: ANESTHESIOLOGY

## 2018-11-14 DEVICE — IMPLANTABLE DEVICE: Type: IMPLANTABLE DEVICE | Site: BREAST | Status: FUNCTIONAL

## 2018-11-14 RX ORDER — ONDANSETRON 2 MG/ML
4 INJECTION INTRAMUSCULAR; INTRAVENOUS EVERY 30 MIN PRN
Status: DISCONTINUED | OUTPATIENT
Start: 2018-11-14 | End: 2018-11-14 | Stop reason: HOSPADM

## 2018-11-14 RX ORDER — MEPERIDINE HYDROCHLORIDE 25 MG/ML
12.5 INJECTION INTRAMUSCULAR; INTRAVENOUS; SUBCUTANEOUS
Status: DISCONTINUED | OUTPATIENT
Start: 2018-11-14 | End: 2018-11-14 | Stop reason: HOSPADM

## 2018-11-14 RX ORDER — SODIUM CHLORIDE, SODIUM LACTATE, POTASSIUM CHLORIDE, CALCIUM CHLORIDE 600; 310; 30; 20 MG/100ML; MG/100ML; MG/100ML; MG/100ML
INJECTION, SOLUTION INTRAVENOUS CONTINUOUS
Status: DISCONTINUED | OUTPATIENT
Start: 2018-11-14 | End: 2018-11-14 | Stop reason: HOSPADM

## 2018-11-14 RX ORDER — PROPOFOL 10 MG/ML
INJECTION, EMULSION INTRAVENOUS CONTINUOUS PRN
Status: DISCONTINUED | OUTPATIENT
Start: 2018-11-14 | End: 2018-11-14

## 2018-11-14 RX ORDER — ONDANSETRON 2 MG/ML
INJECTION INTRAMUSCULAR; INTRAVENOUS PRN
Status: DISCONTINUED | OUTPATIENT
Start: 2018-11-14 | End: 2018-11-14

## 2018-11-14 RX ORDER — LIDOCAINE HYDROCHLORIDE 20 MG/ML
INJECTION, SOLUTION INFILTRATION; PERINEURAL PRN
Status: DISCONTINUED | OUTPATIENT
Start: 2018-11-14 | End: 2018-11-14

## 2018-11-14 RX ORDER — DEXAMETHASONE SODIUM PHOSPHATE 4 MG/ML
INJECTION, SOLUTION INTRA-ARTICULAR; INTRALESIONAL; INTRAMUSCULAR; INTRAVENOUS; SOFT TISSUE PRN
Status: DISCONTINUED | OUTPATIENT
Start: 2018-11-14 | End: 2018-11-14

## 2018-11-14 RX ORDER — FENTANYL CITRATE 50 UG/ML
25-50 INJECTION, SOLUTION INTRAMUSCULAR; INTRAVENOUS
Status: DISCONTINUED | OUTPATIENT
Start: 2018-11-14 | End: 2018-11-14 | Stop reason: HOSPADM

## 2018-11-14 RX ORDER — DIPHENHYDRAMINE HCL 25 MG
25 CAPSULE ORAL ONCE
Status: COMPLETED | OUTPATIENT
Start: 2018-11-14 | End: 2018-11-14

## 2018-11-14 RX ORDER — NALOXONE HYDROCHLORIDE 0.4 MG/ML
.1-.4 INJECTION, SOLUTION INTRAMUSCULAR; INTRAVENOUS; SUBCUTANEOUS
Status: DISCONTINUED | OUTPATIENT
Start: 2018-11-14 | End: 2018-11-14 | Stop reason: HOSPADM

## 2018-11-14 RX ORDER — OXYCODONE AND ACETAMINOPHEN 5; 325 MG/1; MG/1
1 TABLET ORAL ONCE
Status: COMPLETED | OUTPATIENT
Start: 2018-11-14 | End: 2018-11-14

## 2018-11-14 RX ORDER — CEFAZOLIN SODIUM 2 G/100ML
2 INJECTION, SOLUTION INTRAVENOUS
Status: COMPLETED | OUTPATIENT
Start: 2018-11-14 | End: 2018-11-14

## 2018-11-14 RX ORDER — OXYCODONE AND ACETAMINOPHEN 5; 325 MG/1; MG/1
1-2 TABLET ORAL EVERY 4 HOURS PRN
Qty: 20 TABLET | Refills: 0 | Status: SHIPPED | OUTPATIENT
Start: 2018-11-14 | End: 2019-04-09

## 2018-11-14 RX ORDER — PROPOFOL 10 MG/ML
INJECTION, EMULSION INTRAVENOUS PRN
Status: DISCONTINUED | OUTPATIENT
Start: 2018-11-14 | End: 2018-11-14

## 2018-11-14 RX ORDER — FENTANYL CITRATE 50 UG/ML
INJECTION, SOLUTION INTRAMUSCULAR; INTRAVENOUS PRN
Status: DISCONTINUED | OUTPATIENT
Start: 2018-11-14 | End: 2018-11-14

## 2018-11-14 RX ORDER — HYDROMORPHONE HYDROCHLORIDE 1 MG/ML
.3-.5 INJECTION, SOLUTION INTRAMUSCULAR; INTRAVENOUS; SUBCUTANEOUS EVERY 10 MIN PRN
Status: DISCONTINUED | OUTPATIENT
Start: 2018-11-14 | End: 2018-11-14 | Stop reason: HOSPADM

## 2018-11-14 RX ORDER — ACETAMINOPHEN 500 MG
1000 TABLET ORAL ONCE
Status: COMPLETED | OUTPATIENT
Start: 2018-11-14 | End: 2018-11-14

## 2018-11-14 RX ORDER — CEFAZOLIN SODIUM 1 G/3ML
1 INJECTION, POWDER, FOR SOLUTION INTRAMUSCULAR; INTRAVENOUS SEE ADMIN INSTRUCTIONS
Status: DISCONTINUED | OUTPATIENT
Start: 2018-11-14 | End: 2018-11-14 | Stop reason: HOSPADM

## 2018-11-14 RX ORDER — ONDANSETRON 4 MG/1
4 TABLET, ORALLY DISINTEGRATING ORAL EVERY 30 MIN PRN
Status: DISCONTINUED | OUTPATIENT
Start: 2018-11-14 | End: 2018-11-14 | Stop reason: HOSPADM

## 2018-11-14 RX ORDER — SODIUM CHLORIDE, SODIUM LACTATE, POTASSIUM CHLORIDE, CALCIUM CHLORIDE 600; 310; 30; 20 MG/100ML; MG/100ML; MG/100ML; MG/100ML
INJECTION, SOLUTION INTRAVENOUS CONTINUOUS PRN
Status: DISCONTINUED | OUTPATIENT
Start: 2018-11-14 | End: 2018-11-14

## 2018-11-14 RX ORDER — BUPIVACAINE HYDROCHLORIDE 2.5 MG/ML
INJECTION, SOLUTION INFILTRATION; PERINEURAL PRN
Status: DISCONTINUED | OUTPATIENT
Start: 2018-11-14 | End: 2018-11-14 | Stop reason: HOSPADM

## 2018-11-14 RX ADMIN — PROPOFOL 200 MG: 10 INJECTION, EMULSION INTRAVENOUS at 14:49

## 2018-11-14 RX ADMIN — FENTANYL CITRATE 25 MCG: 50 INJECTION, SOLUTION INTRAMUSCULAR; INTRAVENOUS at 16:24

## 2018-11-14 RX ADMIN — DEXMEDETOMIDINE HYDROCHLORIDE 12 MCG: 100 INJECTION, SOLUTION INTRAVENOUS at 16:20

## 2018-11-14 RX ADMIN — FENTANYL CITRATE 50 MCG: 50 INJECTION INTRAMUSCULAR; INTRAVENOUS at 16:54

## 2018-11-14 RX ADMIN — PHENYLEPHRINE HYDROCHLORIDE 50 MCG: 10 INJECTION, SOLUTION INTRAMUSCULAR; INTRAVENOUS; SUBCUTANEOUS at 15:17

## 2018-11-14 RX ADMIN — DIPHENHYDRAMINE HYDROCHLORIDE 25 MG: 25 CAPSULE ORAL at 17:12

## 2018-11-14 RX ADMIN — PROPOFOL 200 MCG/KG/MIN: 10 INJECTION, EMULSION INTRAVENOUS at 14:49

## 2018-11-14 RX ADMIN — FENTANYL CITRATE 50 MCG: 50 INJECTION, SOLUTION INTRAMUSCULAR; INTRAVENOUS at 15:49

## 2018-11-14 RX ADMIN — FENTANYL CITRATE 50 MCG: 50 INJECTION INTRAMUSCULAR; INTRAVENOUS at 16:47

## 2018-11-14 RX ADMIN — ONDANSETRON 4 MG: 2 INJECTION INTRAMUSCULAR; INTRAVENOUS at 16:11

## 2018-11-14 RX ADMIN — EPINEPHRINE 323 ML: 1 INJECTION INTRAMUSCULAR; INTRAVENOUS; SUBCUTANEOUS at 16:12

## 2018-11-14 RX ADMIN — PHENYLEPHRINE HYDROCHLORIDE 50 MCG: 10 INJECTION, SOLUTION INTRAMUSCULAR; INTRAVENOUS; SUBCUTANEOUS at 15:23

## 2018-11-14 RX ADMIN — BUPIVACAINE HYDROCHLORIDE 30 ML: 2.5 INJECTION, SOLUTION EPIDURAL; INFILTRATION; INTRACAUDAL; PERINEURAL at 16:11

## 2018-11-14 RX ADMIN — CEFAZOLIN SODIUM 2 G: 2 INJECTION, SOLUTION INTRAVENOUS at 14:57

## 2018-11-14 RX ADMIN — PHENYLEPHRINE HYDROCHLORIDE 50 MCG: 10 INJECTION, SOLUTION INTRAMUSCULAR; INTRAVENOUS; SUBCUTANEOUS at 15:35

## 2018-11-14 RX ADMIN — HYDROMORPHONE HYDROCHLORIDE 0.5 MG: 1 INJECTION, SOLUTION INTRAMUSCULAR; INTRAVENOUS; SUBCUTANEOUS at 14:57

## 2018-11-14 RX ADMIN — GENTAMICIN SULFATE 1000 ML: 40 INJECTION, SOLUTION INTRAMUSCULAR; INTRAVENOUS at 15:07

## 2018-11-14 RX ADMIN — MIDAZOLAM 2 MG: 1 INJECTION INTRAMUSCULAR; INTRAVENOUS at 14:46

## 2018-11-14 RX ADMIN — SODIUM CHLORIDE, POTASSIUM CHLORIDE, SODIUM LACTATE AND CALCIUM CHLORIDE: 600; 310; 30; 20 INJECTION, SOLUTION INTRAVENOUS at 15:42

## 2018-11-14 RX ADMIN — OXYCODONE HYDROCHLORIDE AND ACETAMINOPHEN 1 TABLET: 5; 325 TABLET ORAL at 17:20

## 2018-11-14 RX ADMIN — FENTANYL CITRATE 50 MCG: 50 INJECTION, SOLUTION INTRAMUSCULAR; INTRAVENOUS at 16:22

## 2018-11-14 RX ADMIN — ACETAMINOPHEN 1000 MG: 500 TABLET, FILM COATED ORAL at 13:17

## 2018-11-14 RX ADMIN — DEXAMETHASONE SODIUM PHOSPHATE 4 MG: 4 INJECTION, SOLUTION INTRA-ARTICULAR; INTRALESIONAL; INTRAMUSCULAR; INTRAVENOUS; SOFT TISSUE at 15:06

## 2018-11-14 RX ADMIN — LIDOCAINE HYDROCHLORIDE 100 MG: 20 INJECTION, SOLUTION INFILTRATION; PERINEURAL at 14:49

## 2018-11-14 RX ADMIN — FENTANYL CITRATE 25 MCG: 50 INJECTION, SOLUTION INTRAMUSCULAR; INTRAVENOUS at 15:56

## 2018-11-14 RX ADMIN — SODIUM CHLORIDE, POTASSIUM CHLORIDE, SODIUM LACTATE AND CALCIUM CHLORIDE: 600; 310; 30; 20 INJECTION, SOLUTION INTRAVENOUS at 14:46

## 2018-11-14 RX ADMIN — FENTANYL CITRATE 50 MCG: 50 INJECTION, SOLUTION INTRAMUSCULAR; INTRAVENOUS at 14:46

## 2018-11-14 ASSESSMENT — LIFESTYLE VARIABLES: TOBACCO_USE: 1

## 2018-11-14 ASSESSMENT — ENCOUNTER SYMPTOMS
ORTHOPNEA: 0
SEIZURES: 0

## 2018-11-14 NOTE — ANESTHESIA PREPROCEDURE EVALUATION
"Procedure: Procedure(s):  BILATERAL SECOND STAGE BREAST RECONSTRUCTION WITH SILICONE IMPLANT PLACEMENT  FAT GRAFTING FROM ABDOMEN TO BILATERAL BREAST  Preop diagnosis: STATUS POST BILATERAL MASTECTOMY   Allergies   Allergen Reactions     Atenolol Other (See Comments)     \"sleeplessness; hallucinations\"     Patient Active Problem List   Diagnosis     Supervision of normal pregnancy      delivery delivered     Meningitis     Viral meningitis     Major depressive disorder, single episode, moderate (H)     BRCA gene positive     Acquired absence of both breasts and nipples     Past Medical History:   Diagnosis Date     Abnormal Pap smear     CRYO     BRCA gene mutation positive in female     BRCA 1     H/O viral meningitis      Heart murmur      Irregular heart beat     past hx of irregular heart rate-episodes of ?v-tach,rare now, controlled with relaxtion and cough     Major depressive disorder, single episode, moderate (H)      MVP (mitral valve prolapse)     diagnosised at age 20     Sprained ankle      Past Surgical History:   Procedure Laterality Date      SECTION  3/15/2013    Procedure:  SECTION;   Section for failure to descend;  Surgeon: Avelina Steele MD;  Location: [facility] OR     GYN SURGERY      cryotherapy cervical      HYSTROSALINGOGRAM[       MASTECTOMY SIMPLE BILATERAL, SENTINEL NODE BILATERAL, COMBINED Bilateral 2018    Procedure: COMBINED MASTECTOMY SIMPLE BILATERAL, SENTINEL NODE BILATERAL;  Bilateral prophylactic total mastectomies, free nipple graft, bilateral tissue expanders ;  Surgeon: Dayana Carter MD;  Location: [facility] OR     RECONSTRUCT BREAST, INSERT TISSUE EXPANDER BILATERAL, COMBINED Bilateral 2018    Procedure: COMBINED RECONSTRUCT BREAST, INSERT TISSUE EXPANDER BILATERAL;;  Surgeon: Clara Riddle MD;  Location: [facility] OR     REVISE RECONSTRUCTED BREAST BILATERAL Bilateral 2018    Procedure: REVISE RECONSTRUCTED BREAST " BILATERAL;  REVISION OF BILATERAL MASTECTOMY INCISIONS ;  Surgeon: Clara Riddle MD;  Location:  OR     TONSILLECTOMY & ADENOIDECTOMY  2004     wisdom teeth removal         No current facility-administered medications on file prior to encounter.   Current Outpatient Prescriptions on File Prior to Encounter:  Cetirizine HCl (ZYRTEC ALLERGY PO) Take 5 mg by mouth daily    citalopram (CELEXA) 20 MG tablet TAKE 1 TABLET(20 MG) BY MOUTH AT BEDTIME   triamcinolone (KENALOG) 0.1 % cream Apply sparingly to affected area three times daily for 14 days.     There were no vitals taken for this visit.    Lab Results   Component Value Date    WBC 10.0 03/21/2017     Lab Results   Component Value Date    RBC 4.50 03/21/2017     Lab Results   Component Value Date    HGB 12.8 11/09/2018     Lab Results   Component Value Date    HCT 39.8 03/21/2017     Lab Results   Component Value Date    MCV 88 03/21/2017     Lab Results   Component Value Date    MCH 29.6 03/21/2017     Lab Results   Component Value Date    MCHC 33.4 03/21/2017     Lab Results   Component Value Date    RDW 13.2 03/21/2017     Lab Results   Component Value Date     03/21/2017     No results found for: INR    Last Basic Metabolic Panel:  Lab Results   Component Value Date     05/18/2016      Lab Results   Component Value Date    POTASSIUM 3.8 05/29/2018     Lab Results   Component Value Date    CHLORIDE 108 05/18/2016     Lab Results   Component Value Date    SILVERIO 8.7 05/18/2016     Lab Results   Component Value Date    CO2 25 05/18/2016     Lab Results   Component Value Date    BUN 8 05/18/2016     Lab Results   Component Value Date    CR 0.69 05/29/2018     Lab Results   Component Value Date    GLC 76 05/18/2016     Anesthesia Evaluation     . Pt has had prior anesthetic. Type: General           ROS/MED HX    ENT/Pulmonary:     (+)tobacco use, Past use , . .   (-) sleep apnea and recent URI   Neurologic: Comment: Hx of viral meningitis      (-) seizures, CVA and migraines   Cardiovascular: Comment: Palpitations in distant history, not active    Hx of tachycardia in high school,  - neg cardiovascular ROS   (+) ----. : . . . :. Irregular Heartbeat/Palpitations, valvular problems/murmurs type: MVP . Previous cardiac testing date:results:date: results:ECG reviewed date:5/2018 results:NSR date: results:         (-) hypertension, CAD, CHF and orthopnea/PND   METS/Exercise Tolerance:     Hematologic:  - neg hematologic  ROS       Musculoskeletal: Comment: BCRA gene positive s/p bilateral mastectomy        GI/Hepatic:  - neg GI/hepatic ROS      (-) GERD   Renal/Genitourinary:  - ROS Renal section negative       Endo:  - neg endo ROS    (-) Type II DM and thyroid disease   Psychiatric:     (+) psychiatric history depression      Infectious Disease:  - neg infectious disease ROS       Malignancy:   (+)   BRCA1 gene positive with prophylactic bilateral mastectomy in the past     - no malignancy   Other:    - neg other ROS                 Physical Exam  Normal systems: cardiovascular, pulmonary and dental    Airway   Mallampati: II  TM distance: >3 FB  Neck ROM: full    Dental     Cardiovascular   Rhythm and rate: regular and normal      Pulmonary    breath sounds clear to auscultation                    Anesthesia Plan      History & Physical Review  History and physical reviewed and following examination; no interval change.    ASA Status:  2 .    NPO Status:  > 8 hours    Plan for General and LMA with Propofol induction. Maintenance will be TIVA.    PONV prophylaxis:  Ondansetron (or other 5HT-3)  Propofol infusion      Postoperative Care  Postoperative pain management:  IV analgesics and Oral pain medications.      Consents  Anesthetic plan, risks, benefits and alternatives discussed with:  Patient..                          .

## 2018-11-14 NOTE — OP NOTE
PLASTIC SURGERY OPERATIVE NOTE  Patient Name:  Gayle Moya     Date of Service:  11/14/2018     PREOPERATIVE DIAGNOSES:   1.  STATUS POST BILATERAL MASTECTOMY    2.  Genetic history for breast cancer    POSTOPERATIVE DIAGNOSES:   1.  STATUS POST BILATERAL MASTECTOMY      2.  Genetic history for breast cancer    SURGEON:  Clara Riddle MD   OR STAFF:  Circulator: Tamy Tena RN  Relief Circulator: Maddie Holley RN  Scrub Person: Patrizia Williamson RN     ANESTHESIA:  General     ESTIMATED BLOOD LOSS:  10 mL    SPECIMEN(S):  * No specimens in log *     PROCEDURES:   1.  Bilateral silicone breast reconstruction   2.  Fat grafting from abdomen to bilateral chest      DESCRIPTION OF PROCEDURE:  Patient was marked for incisions and taken to the operating room.  General anesthesia was chest and abdomen were prepped and draped in sterile manner.  Is on the right side, an incision was made through the vertical mastectomy scar and dissection was carried down to underlying pectoralis muscle.  The muscle was divided perpendicular to the skin incision.  The underlying tissue expander was ruptured and removed from the pocket.  Scoring the capsule was done superiorly medially.  Hemostasis was achieved.  A sizer was obtained and placed within the pocket.  A silicone implant was chosen.  The pocket was irrigated antibiotic solution and Betadine solution.  A silicone implant was placed in the pectoralis muscle was reapproximated to 3-0 Monocryl suture.  Skin was closed with 3-0 Monocryl in the subcutaneous tissue followed by running 4 oh subcu Monocryl suture.  A similar procedure was completed on the other side.    Incision and dissection was carried down to underlying pectoralis muscle.  Muscle was divided perpendicular to the skin incision.  The underlying tissue expander was ruptured and removed.  Scoring the capsule done circumferentially.  Various sized sizers were placed within the pocket and the patient was  brought the upright position and inspected for symmetry.  Modifications to the inframammary fold were necessary.    Patient was returned to supine position.  The sizers were removed.  The lateral aspect the inframammary fold was repositioned with interrupted 2-0 PDS suture.  The pocket was then irrigated antibiotic solution followed by Betadine solution.  The capsule was removed the pectoralis muscle was reapproximated with 3-0 Monocryl suture.  The skin was closed with a 3-0 Monocryl in the subcutaneous tissue followed by running 4-0 subcuticular Monocryl suture.    Tumescence infiltrated into the abdominal area and standard liposuction was completed with a 4 mm cannula.  Harvested fat was processed with the [employer]find[employer] device.  Harvested fat was then transferred to the chest area filling both superior pole defects.  A total of 200 cc of fat was grafted.  Port sites were closed with interrupted 5-0 fast absorbing suture.    Xeroform followed by light dressing was applied the patient circumferentially wrapped with double 4 inch Ace bandage.      IMPLANTS:    Implant Name Type Inv. Item Serial No.  Lot No. LRB No. Used   Natrelle Inspira breat implant 520cc   [id number] [employer]  Left 1   Natrelle Inspira breast implant 520cc     [id number] [employer]   Right 1       FINDINGS:  200 ml of fat grafted to chest area. Both implants are 520 ml.    Clara Riddle MD  Plastic Surgery  [facility]  [phone] (office)

## 2018-11-14 NOTE — IP AVS SNAPSHOT
[facility]    [address]    [place] [address]    Phone:  [phone]    Fax:  [phone]                                       After Visit Summary   11/14/2018    Gayle Moya    MRN: [id number]           After Visit Summary Signature Page     I have received my discharge instructions, and my questions have been answered. I have discussed any challenges I see with this plan with the nurse or doctor.    ..........................................................................................................................................  Patient/Patient Representative Signature      ..........................................................................................................................................  Patient Representative Print Name and Relationship to Patient    ..................................................               ................................................  Date                                   Time    ..........................................................................................................................................  Reviewed by Signature/Title    ...................................................              ..............................................  Date                                               Time          22EPIC Rev 08/18

## 2018-11-14 NOTE — ANESTHESIA CARE TRANSFER NOTE
Patient: Gayle Moya    Procedure(s):  BILATERAL SECOND STAGE BREAST RECONSTRUCTION WITH SILICONE IMPLANT PLACEMENT  FAT GRAFTING FROM ABDOMEN TO BILATERAL BREAST    Diagnosis: STATUS POST BILATERAL MASTECTOMY   Diagnosis Additional Information: No value filed.    Anesthesia Type:   General, LMA     Note:  Airway :Face Mask  Patient transferred to:PACU  Comments: At end of procedure, spontaneous respirations, adequate tidal volumes, followed commands to voice, LMA removed atraumatically, oropharynx suctioned, airway patent after LMA removal. Oxygen via facemask at 10 liters per minute to PACU. Oxygen tubing connected to wall O2 in PACU, SpO2, NiBP, and EKG monitors and alarms on and functioning, Radha Hugger warmer connected to patient gown, report on patient's clinical status given to PACU RN, RN questions answered.Handoff Report: Identifed the Patient, Identified the Reponsible Provider, Reviewed the pertinent medical history, Discussed the surgical course, Reviewed Intra-OP anesthesia mangement and issues during anesthesia, Set expectations for post-procedure period and Allowed opportunity for questions and acknowledgement of understanding      Vitals: (Last set prior to Anesthesia Care Transfer)    CRNA VITALS  11/14/2018 1551 - 11/14/2018 1630      11/14/2018             Resp Rate (set): 10                Electronically Signed By: PETRA Mix CRNA  November 14, 2018  4:30 PM

## 2018-11-14 NOTE — ANESTHESIA POSTPROCEDURE EVALUATION
Patient: Gayle Moya    Procedure(s):  BILATERAL SECOND STAGE BREAST RECONSTRUCTION WITH SILICONE IMPLANT PLACEMENT  FAT GRAFTING FROM ABDOMEN TO BILATERAL BREAST    Diagnosis:STATUS POST BILATERAL MASTECTOMY   Diagnosis Additional Information: No value filed.    Anesthesia Type:  General, LMA    Note:  Anesthesia Post Evaluation    Patient location during evaluation: PACU  Patient participation: Able to fully participate in evaluation  Level of consciousness: awake  Pain management: adequate  Airway patency: patent  Cardiovascular status: acceptable  Respiratory status: acceptable  Hydration status: acceptable  PONV: none     Anesthetic complications: None          Last vitals:  Vitals:    11/14/18 1312 11/14/18 1630   BP: 125/74 130/79   Resp: 20 9   SpO2: 97% 100%         Electronically Signed By: Ubaldo Berg MD  November 14, 2018  4:46 PM

## 2018-11-14 NOTE — IP AVS SNAPSHOT
MRN:[id number]                      After Visit Summary   11/14/2018    Gayle Moya    MRN: [id number]           Thank you!     Thank you for choosing [facility] for your care. Our goal is always to provide you with excellent care. Hearing back from our patients is one way we can continue to improve our services. Please take a few minutes to complete the written survey that you may receive in the mail after you visit with us. Thank you!        Patient Information     Date Of Birth          1980        About your hospital stay     You were admitted on:  November 14, 2018 You last received care in the:  [facility] Same Day Surgery    You were discharged on:  November 14, 2018        Reason for your hospital stay       Surgery.                  Who to Call     For medical emergencies, please call 911.  For non-urgent questions about your medical care, please call your primary care provider or clinic, [phone]  For questions related to your surgery, please call your surgery clinic        Attending Provider     Provider Clara Sung MD Plastic Surgery       Primary Care Provider Office Phone # Fax #    Yi Michael Gottlieb [phone]      After Care Instructions     Discharge Instructions       Follow up with Dr. Riddle in 2 weeks.            Wound care and dressings       Remove dressing 24-48 hours after surgery.  Then may shower with incisions uncovered. Apply Aquaphor to incisions daily. No restrictions on arm movements. Avoid heavy lifting or strenuous exercise for 2 weeks. Camisole/soft bra prn comfort. Abdominal binder prn comfort. No ice to chest area. May use OTC ibuprofen/tylenol for discomfort.                  Further instructions from your care team       Same Day Surgery Discharge Instructions for  Sedation and General Anesthesia       It's not unusual to feel dizzy, light-headed or faint for up to 24 hours after surgery or while  taking pain medication.  If you have these symptoms: sit for a few minutes before standing and have someone assist you when you get up to walk or use the bathroom.      You should rest and relax for the next 24 hours. We recommend you make arrangements to have an adult stay with you for at least 24 hours after your discharge.  Avoid hazardous and strenuous activity.      DO NOT DRIVE any vehicle or operate mechanical equipment for 24 hours following the end of your surgery.  Even though you may feel normal, your reactions may be affected by the medication you have received.      Do not drink alcoholic beverages for 24 hours following surgery.       Slowly progress to your regular diet as you feel able. It's not unusual to feel nauseated and/or vomit after receiving anesthesia.  If you develop these symptoms, drink clear liquids (apple juice, ginger ale, broth, 7-up, etc. ) until you feel better.  If your nausea and vomiting persists for 24 hours, please notify your surgeon.        All narcotic pain medications, along with inactivity and anesthesia, can cause constipation. Drinking plenty of liquids and increasing fiber intake will help.      For any questions of a medical nature, call your surgeon.      Do not make important decisions for 24 hours.      If you had general anesthesia, you may have a sore throat for a couple of days related to the breathing tube used during surgery.  You may use Cepacol lozenges to help with this discomfort.  If it worsens or if you develop a fever, contact your surgeon.       If you feel your pain is not well managed with the pain medications prescribed by your surgeon, please contact your surgeon's office to let them know so they can address your concerns.           Discharge Instructions following Breast Surgery  [facility]    Diet:   Resume diet as tolerated.  Drink plenty of fluids to prevent constipation.    Activity:   Gentle rotation & stretching of  "your arms/shoulders will prevent stiffness in joints   Increase activity gradually   No heavy lifting greater than 10-15 pounds & no strenuous activity until  approved by surgeon    Bathing/Incision Care:   You may shower as directed by surgeon   Pat incisions dry.  No lotions, powders or perfumes to incisions   Tape dressings (steri strips) will fall off in 7-10 days (if present)    What to expect:   A tingly or itchy sensation around the incision is a normal sign of healing   Some clear, pink drainage from incisions is normal.      Notify your surgeon for the following signs & symptoms:   Redness, warmth, or swelling of the incision    Foul smelling or increased drainage   Chills or temperature greater than 101 F   Pain not controlled by pain medications        Today you were given 975 mg of Tylenol at 1:15 PM. The recommended daily maximum dose is 4000 mg.       Pending Results     No orders found from 2018 to 11/15/2018.            Admission Information     Date & Time Provider Department Dept. Phone    2018 Clara Riddle MD [facility] Same Day Surgery [phone]      Your Vitals Were     Blood Pressure Temperature Respirations Last Period Pulse Oximetry       127/74 98.2  F (36.8  C) 12 10/27/2018 96%       MyChart Information     [employer] lets you send messages to your doctor, view your test results, renew your prescriptions, schedule appointments and more. To sign up, go to [email or web address] . Click on \"Log in\" on the left side of the screen, which will take you to the Welcome page. Then click on \"Sign up Now\" on the right side of the page.     You will be asked to enter the access code listed below, as well as some personal information. Please follow the directions to create your username and password.     Your access code is: [email or web address]  Expires: 2019  2:58 PM     Your access code will  in 90 days. If you need help or a new code, please call your [facility] " clinic or [phone].        Care EveryWhere ID     This is your Care EveryWhere ID. This could be used by other organizations to access your [facility] medical records  [id number]        Equal Access to Services     BE SOMMER : Hadii aad ku hadkristine Rincon, waaxda luqadaha, qaybta kaalmada adecharo, tricia au laPedrodonna olsen. So [facility] [phone].    ATENCIÓN: Si habla español, tiene a calderón disposición servicios gratuitos de asistencia lingüística. Llame al [phone].    We comply with applicable federal civil rights laws and [place] laws. We do not discriminate on the basis of race, color, national origin, age, disability, sex, sexual orientation, or gender identity.               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   Notes to Patient:  Had one tab at 5:20 PM        Dose:  1-2 tablet   Take 1-2 tablets by mouth every 4 hours as needed for severe pain   Quantity:  20 tablet   Refills:  0         CONTINUE these medicines which have NOT CHANGED        Dose / Directions    citalopram 20 MG tablet   Commonly known as:  celeXA   Used for:  Major depressive disorder, single episode, moderate (H)        TAKE 1 TABLET(20 MG) BY MOUTH AT BEDTIME   Quantity:  90 tablet   Refills:  3       triamcinolone 0.1 % cream   Commonly known as:  KENALOG   Used for:  Rash        Apply sparingly to affected area three times daily for 14 days.   Quantity:  15 g   Refills:  0       ZYRTEC ALLERGY PO        Dose:  5 mg   Take 5 mg by mouth daily   Refills:  0            Where to get your medicines      Some of these will need a paper prescription and others can be bought over the counter. Ask your nurse if you have questions.     Bring a paper prescription for each of these medications     oxyCODONE-acetaminophen 5-325 MG per tablet                 Protect others around you: Learn how to safely use, store and throw away your medicines at www.disposemymeds.org.        Information about OPIOIDS     PRESCRIPTION OPIOIDS: WHAT YOU NEED TO KNOW   We gave you an opioid (narcotic) pain medicine. It is important to manage your pain, but opioids are not always the best choice. You should first try all the other options your care team gave you. Take this medicine for as short a time (and as few doses) as possible.    Some activities can increase your pain, such as bandage changes or therapy sessions. It may help to take your pain medicine 30 to 60 minutes before these activities. Reduce your stress by getting enough sleep, working on hobbies you enjoy and practicing relaxation or meditation. Talk to your care team about ways to manage your pain beyond prescription opioids.    These medicines have risks:    DO NOT drive when on new or higher doses of pain medicine. These medicines can affect your alertness and reaction times, and you could be arrested for driving under the influence (DUI). If you need to use opioids long-term, talk to your care team about driving.    DO NOT operate heavy machinery    DO NOT do any other dangerous activities while taking these medicines.    DO NOT drink any alcohol while taking these medicines.     If the opioid prescribed includes acetaminophen, DO NOT take with any other medicines that contain acetaminophen. Read all labels carefully. Look for the word  acetaminophen  or  Tylenol.  Ask your pharmacist if you have questions or are unsure.    You can get addicted to pain medicines, especially if you have a history of addiction (chemical, alcohol or substance dependence). Talk to your care team about ways to reduce this risk.    All opioids tend to cause constipation. Drink plenty of water and eat foods that have a lot of fiber, such as fruits, vegetables, prune juice, apple juice and high-fiber cereal. Take a laxative (Miralax, milk of  magnesia, Colace, Senna) if you don t move your bowels at least every other day. Other side effects include upset stomach, sleepiness, dizziness, throwing up, tolerance (needing more of the medicine to have the same effect), physical dependence and slowed breathing.    Store your pills in a secure place, locked if possible. We will not replace any lost or stolen medicine. If you don t finish your medicine, please throw away (dispose) as directed by your pharmacist. The [place] Pollution Control [employer] has more information about safe disposal: https://[email or web address]/living-green/managing-unwanted-medications             Medication List: This is a list of all your medications and when to take them. Check marks below indicate your daily home schedule. Keep this list as a reference.      Medications           Morning Afternoon Evening Bedtime As Needed    citalopram 20 MG tablet   Commonly known as:  celeXA   TAKE 1 TABLET(20 MG) BY MOUTH AT BEDTIME                                oxyCODONE-acetaminophen 5-325 MG per tablet   Commonly known as:  PERCOCET   Take 1-2 tablets by mouth every 4 hours as needed for severe pain   Last time this was given:  1 tablet on 11/14/2018  5:20 PM   Notes to Patient:  Had one tab at 5:20 PM                                triamcinolone 0.1 % cream   Commonly known as:  KENALOG   Apply sparingly to affected area three times daily for 14 days.                                ZYRTEC ALLERGY PO   Take 5 mg by mouth daily

## 2018-11-14 NOTE — DISCHARGE INSTRUCTIONS
Same Day Surgery Discharge Instructions for  Sedation and General Anesthesia       It's not unusual to feel dizzy, light-headed or faint for up to 24 hours after surgery or while taking pain medication.  If you have these symptoms: sit for a few minutes before standing and have someone assist you when you get up to walk or use the bathroom.      You should rest and relax for the next 24 hours. We recommend you make arrangements to have an adult stay with you for at least 24 hours after your discharge.  Avoid hazardous and strenuous activity.      DO NOT DRIVE any vehicle or operate mechanical equipment for 24 hours following the end of your surgery.  Even though you may feel normal, your reactions may be affected by the medication you have received.      Do not drink alcoholic beverages for 24 hours following surgery.       Slowly progress to your regular diet as you feel able. It's not unusual to feel nauseated and/or vomit after receiving anesthesia.  If you develop these symptoms, drink clear liquids (apple juice, ginger ale, broth, 7-up, etc. ) until you feel better.  If your nausea and vomiting persists for 24 hours, please notify your surgeon.        All narcotic pain medications, along with inactivity and anesthesia, can cause constipation. Drinking plenty of liquids and increasing fiber intake will help.      For any questions of a medical nature, call your surgeon.      Do not make important decisions for 24 hours.      If you had general anesthesia, you may have a sore throat for a couple of days related to the breathing tube used during surgery.  You may use Cepacol lozenges to help with this discomfort.  If it worsens or if you develop a fever, contact your surgeon.       If you feel your pain is not well managed with the pain medications prescribed by your surgeon, please contact your surgeon's office to let them know so they can address your concerns.           Discharge Instructions following Breast  Surgery  [facility] Same Day Surgery    Diet:   Resume diet as tolerated.  Drink plenty of fluids to prevent constipation.    Activity:   Gentle rotation & stretching of your arms/shoulders will prevent stiffness in joints   Increase activity gradually   No heavy lifting greater than 10-15 pounds & no strenuous activity until  approved by surgeon    Bathing/Incision Care:   You may shower as directed by surgeon   Pat incisions dry.  No lotions, powders or perfumes to incisions   Tape dressings (steri strips) will fall off in 7-10 days (if present)    What to expect:   A tingly or itchy sensation around the incision is a normal sign of healing   Some clear, pink drainage from incisions is normal.      Notify your surgeon for the following signs & symptoms:   Redness, warmth, or swelling of the incision    Foul smelling or increased drainage   Chills or temperature greater than 101 F   Pain not controlled by pain medications        Today you were given 975 mg of Tylenol at 1:15 PM. The recommended daily maximum dose is 4000 mg.

## 2018-11-14 NOTE — BRIEF OP NOTE
[facility] Brief Operative Note    Pre-operative diagnosis: STATUS POST BILATERAL MASTECTOMY    Post-operative diagnosis breast reconstruction     Procedure: Procedure(s):  BILATERAL SECOND STAGE BREAST RECONSTRUCTION WITH SILICONE IMPLANT PLACEMENT  FAT GRAFTING FROM ABDOMEN TO BILATERAL BREAST   Surgeon(s): Surgeon(s) and Role:  Panel 1:     * Clara Riddle MD - Primary    Panel 2:     * Clara Riddle MD - Primary   Estimated blood loss: 10 mL    Specimens: * No specimens in log *   Findings: 200 ml of fat grafted to the breast area

## 2018-12-13 DIAGNOSIS — F32.1 MAJOR DEPRESSIVE DISORDER, SINGLE EPISODE, MODERATE (H): ICD-10-CM

## 2018-12-13 NOTE — LETTER
[facility]  [address], [address]  [place], [place]  [address]                                            TEL:[phone]  FAX:[phone]      Gayle Moya  [address] [place] [id number]      December 18, 2018    Dear Gayle       We have received a refill request from your pharmacy, however, we were only able to provide a one time fill because you are due for an office visit with Dr. Gottlieb. Please call [phone] to schedule an appointment before you are due for your next refill.      Thank you,     AKHIL Avila

## 2018-12-14 NOTE — TELEPHONE ENCOUNTER
"Requested Prescriptions   Pending Prescriptions Disp Refills     citalopram (CELEXA) 20 MG tablet [Pharmacy Med Name: CITALOPRAM 20MG TABLETS] 90 tablet 0    Last Written Prescription Date:  05/17/2018  Last Fill Quantity: 90,  # refills: 3   Last office visit: 5/17/2018 with prescribing provider:     Future Office Visit:   Sig: TAKE 1 TABLET(20 MG) BY MOUTH AT BEDTIME    SSRIs Protocol Failed - 12/13/2018  4:25 PM       Failed - PHQ-9 score less than 5 in past 6 months    Please review last PHQ-9 score.          Failed - Recent (6 mo) or future (30 days) visit within the authorizing provider's specialty    Patient had office visit in the last 6 months or has a visit in the next 30 days with authorizing provider or within the authorizing provider's specialty.  See \"Patient Info\" tab in inbasket, or \"Choose Columns\" in Meds & Orders section of the refill encounter.           Passed - Patient is age 18 or older       Passed - No active pregnancy on record       Passed - No positive pregnancy test in last 12 months        "

## 2018-12-18 RX ORDER — CITALOPRAM HYDROBROMIDE 20 MG/1
TABLET ORAL
Qty: 90 TABLET | Refills: 0 | Status: SHIPPED | OUTPATIENT
Start: 2018-12-18 | End: 2019-03-18

## 2018-12-18 NOTE — TELEPHONE ENCOUNTER
PHQ-9 score:    PHQ-9 SCORE 5/17/2018   PHQ-9 Total Score MyChart 0   PHQ-9 Total Score 0       Medication is being filled for 1 time refill only due to:  Patient needs to be seen because due for office visit.     Letter mailed to patient reminding her to schedule an appointment.

## 2019-03-18 DIAGNOSIS — F32.1 MAJOR DEPRESSIVE DISORDER, SINGLE EPISODE, MODERATE (H): ICD-10-CM

## 2019-03-18 NOTE — TELEPHONE ENCOUNTER
"Requested Prescriptions   Pending Prescriptions Disp Refills     citalopram (CELEXA) 20 MG tablet [Pharmacy Med Name: CITALOPRAM 20MG TABLETS] 90 tablet 0    Last Written Prescription Date:  12/18/2018  Last Fill Quantity: 90,  # refills: 0   Last office visit: 5/17/2018 with prescribing provider:     Future Office Visit:   Sig: TAKE 1 TABLET BY MOUTH EVERY NIGHT AT BEDTIME    SSRIs Protocol Failed - 3/18/2019 12:00 PM       Failed - PHQ-9 score less than 5 in past 6 months    Please review last PHQ-9 score.          Failed - Recent (6 mo) or future (30 days) visit within the authorizing provider's specialty    Patient had office visit in the last 6 months or has a visit in the next 30 days with authorizing provider or within the authorizing provider's specialty.  See \"Patient Info\" tab in inbasket, or \"Choose Columns\" in Meds & Orders section of the refill encounter.           Passed - Medication is active on med list       Passed - Patient is age 18 or older       Passed - No active pregnancy on record       Passed - No positive pregnancy test in last 12 months        "

## 2019-03-20 NOTE — TELEPHONE ENCOUNTER
PHQ-9 score:    PHQ-9 SCORE 5/17/2018   PHQ-9 Total Score MyChart 0   PHQ-9 Total Score 0       Letter was mailed to patient in December advising her she was due for follow-up appointment, but patient has not scheduled an appointment. Attempted to contact patient. Left voice message to call back to schedule an appointment.

## 2019-03-25 ENCOUNTER — TELEPHONE (OUTPATIENT)
Dept: INTERNAL MEDICINE | Facility: CLINIC | Age: 39
End: 2019-03-25

## 2019-03-25 RX ORDER — CITALOPRAM HYDROBROMIDE 20 MG/1
TABLET ORAL
Qty: 90 TABLET | Refills: 0 | Status: SHIPPED | OUTPATIENT
Start: 2019-03-25 | End: 2019-04-09

## 2019-03-25 NOTE — TELEPHONE ENCOUNTER
Refill addressed in 3/18/19 refill encounter.    Left voice message for patient that prescription was approved x1.

## 2019-03-25 NOTE — TELEPHONE ENCOUNTER
Per 3/25/19 telephone encounter, patient has future appointment scheduled and needs refill to last until then.     Next 5 appointments (look out 90 days)    Apr 09, 2019  2:00 PM CDT  [facility]YS[facility]i [facility]b [facility] [address]  [facility] [id number] [phone]        Medication is being filled for 1 time refill only due to:  Future appointment scheduled

## 2019-03-25 NOTE — TELEPHONE ENCOUNTER
Reason for Call:  Medication or medication refill:   citalopram (CELEXA) 20 MG tablet    Do you use a [facility]?  Name of the pharmacy and phone number for the current request:     [facility] [facility]      Name of the medication requested:     citalopram (CELEXA) 20 MG tablet    Other request: Pt was told she needs to come in for an apt before she can get her medication refilled. She has called and scheduled this apt for April 9th at 2:00 pm and will need her medication before this. Pt will be out of medication tomorrow.    Can we leave a detailed message on this number? YES    Phone number patient can be reached at: Cell number on file:    Telephone Information:   Mobile [phone]       Best Time: Any    Call taken on 3/25/2019 at 12:15 PM by Yolette Wolfe

## 2019-04-09 ENCOUNTER — OFFICE VISIT (OUTPATIENT)
Dept: INTERNAL MEDICINE | Facility: CLINIC | Age: 39
End: 2019-04-09
Payer: COMMERCIAL

## 2019-04-09 VITALS
TEMPERATURE: 98.5 F | SYSTOLIC BLOOD PRESSURE: 116 MMHG | RESPIRATION RATE: 14 BRPM | DIASTOLIC BLOOD PRESSURE: 66 MMHG | OXYGEN SATURATION: 99 % | WEIGHT: 170 LBS | HEART RATE: 77 BPM | HEIGHT: 66 IN | BODY MASS INDEX: 27.32 KG/M2

## 2019-04-09 DIAGNOSIS — N93.9 VAGINAL SPOTTING: ICD-10-CM

## 2019-04-09 DIAGNOSIS — F32.1 MAJOR DEPRESSIVE DISORDER, SINGLE EPISODE, MODERATE (H): Primary | ICD-10-CM

## 2019-04-09 LAB — HGB BLD-MCNC: 13.5 G/DL (ref 11.7–15.7)

## 2019-04-09 PROCEDURE — 84443 ASSAY THYROID STIM HORMONE: CPT | Performed by: INTERNAL MEDICINE

## 2019-04-09 PROCEDURE — 36415 COLL VENOUS BLD VENIPUNCTURE: CPT | Performed by: INTERNAL MEDICINE

## 2019-04-09 PROCEDURE — 85018 HEMOGLOBIN: CPT | Performed by: INTERNAL MEDICINE

## 2019-04-09 PROCEDURE — 99213 OFFICE O/P EST LOW 20 MIN: CPT | Performed by: INTERNAL MEDICINE

## 2019-04-09 RX ORDER — CITALOPRAM HYDROBROMIDE 20 MG/1
20 TABLET ORAL AT BEDTIME
Qty: 90 TABLET | Refills: 1 | Status: SHIPPED | OUTPATIENT
Start: 2019-04-09 | End: 2019-12-21

## 2019-04-09 RX ORDER — MULTIPLE VITAMINS W/ MINERALS TAB 9MG-400MCG
1 TAB ORAL DAILY
COMMUNITY

## 2019-04-09 ASSESSMENT — ENCOUNTER SYMPTOMS: BREAST MASS: 0

## 2019-04-09 ASSESSMENT — MIFFLIN-ST. JEOR: SCORE: 1477.86

## 2019-04-09 ASSESSMENT — PATIENT HEALTH QUESTIONNAIRE - PHQ9: SUM OF ALL RESPONSES TO PHQ QUESTIONS 1-9: 0

## 2019-04-09 NOTE — PROGRESS NOTES
"  SUBJECTIVE:   Gayle Moya is a 38 year old female who presents to clinic today for the following   health issues:      Depression Followup    Status since last visit: Stable     See PHQ-9 for current symptoms.  Other associated symptoms: None    Complicating factors:   Significant life event:  No   Current substance abuse:  None  Anxiety or Panic symptoms:  No    PHQ 5/17/2018 5/17/2018 4/9/2019   PHQ-9 Total Score 0 0 0   Q9: Thoughts of better off dead/self-harm past 2 weeks Not at all Not at all Not at all       Vaginal spotting.  She has had vaginal spotting 3-4 times over the past year.   Pap smear was within normal limits in 2017.   She has had a vaginal ultrasound in 2017 revealing an ovarian cyst.   The patient plans on seeing her gynecologist.     PHQ-9  English  PHQ-9   Any Language  Suicide Assessment Five-step Evaluation and Treatment (SAFE-T)    Amount of exercise or physical activity: started exercising again    Problems taking medications regularly: No    Medication side effects: none    Diet: regular (no restrictions)          Additional history: as documented    Reviewed  and updated as needed this visit by clinical staff  Tobacco  Allergies  Meds  Med Hx  Surg Hx  Fam Hx  Soc Hx        Reviewed and updated as needed this visit by Provider         Labs reviewed in EPIC    ROS:  CONSTITUTIONAL: NEGATIVE for fever, chills, change in weight  RESP: NEGATIVE for significant cough or SOB  CV: NEGATIVE for chest pain, palpitations or peripheral edema  Psych: POS depression- stable    OBJECTIVE:     /66 (BP Location: Left arm, Patient Position: Sitting, Cuff Size: Adult Large)   Pulse 77   Temp 98.5  F (36.9  C) (Oral)   Resp 14   Ht 1.676 m (5' 6\")   Wt 77.1 kg (170 lb)   LMP 04/05/2019 (Exact Date)   SpO2 99%   Breastfeeding? No   BMI 27.44 kg/m    Body mass index is 27.44 kg/m .  GENERAL: healthy, alert and no distress  NECK: no adenopathy, no asymmetry, masses, or scars and " thyroid normal to palpation  RESP: lungs clear to auscultation - no rales, rhonchi or wheezes  CV: regular rate and rhythm, normal S1 S2, no S3 or S4, no murmur, click or rub  Psych: normal affect      ASSESSMENT/PLAN:       (F32.1) Major depressive disorder, single episode, moderate (H)  (primary encounter diagnosis)  Comment: stable  Plan: citalopram (CELEXA) 20 MG tablet            (N93.9) Vaginal spotting  Comment: assess for thyroid etiology; likely gynecologic etiology  Plan: TSH with free T4 reflex, Hemoglobin        -pt to follow up with gynecology          Yi Gottlieb MD  [facility]

## 2019-04-09 NOTE — LETTER
April 10, 2019      Gayle A Griffin  [address] [place] [id number]        Dear ,    We are writing to inform you of your test results.    The thyroid and hemoglobin are within normal limits.    Resulted Orders   TSH with free T4 reflex   Result Value Ref Range    TSH 1.46 0.40 - 4.00 mU/L   Hemoglobin   Result Value Ref Range    Hemoglobin 13.5 11.7 - 15.7 g/dL       If you have any questions or concerns, please call the clinic at the number listed above.       Sincerely,        Yi Gottlieb MD

## 2019-04-10 LAB — TSH SERPL DL<=0.005 MIU/L-ACNC: 1.46 MU/L (ref 0.4–4)

## 2019-12-21 DIAGNOSIS — F32.1 MAJOR DEPRESSIVE DISORDER, SINGLE EPISODE, MODERATE (H): ICD-10-CM

## 2019-12-21 RX ORDER — CITALOPRAM HYDROBROMIDE 20 MG/1
TABLET ORAL
Qty: 90 TABLET | Refills: 0 | Status: SHIPPED | OUTPATIENT
Start: 2019-12-21 | End: 2020-03-24

## 2019-12-21 NOTE — TELEPHONE ENCOUNTER
"Requested Prescriptions   Pending Prescriptions Disp Refills     citalopram (CELEXA) 20 MG tablet [Pharmacy Med Name: CITALOPRAM 20MG TABLETS] 90 tablet 1     Sig: TAKE 1 TABLET(20 MG) BY MOUTH AT BEDTIME       SSRIs Protocol Failed - 12/21/2019  9:03 AM        Failed - PHQ-9 score less than 5 in past 6 months     Please review last PHQ-9 score.           Failed - Recent (6 mo) or future (30 days) visit within the authorizing provider's specialty     Patient had office visit in the last 6 months or has a visit in the next 30 days with authorizing provider or within the authorizing provider's specialty.  See \"Patient Info\" tab in inbasket, or \"Choose Columns\" in Meds & Orders section of the refill encounter.            Passed - Medication is active on med list        Passed - Patient is age 18 or older        Passed - No active pregnancy on record        Passed - No positive pregnancy test in last 12 months          "

## 2019-12-21 NOTE — TELEPHONE ENCOUNTER
Medication is being filled for 1 time refill only due to:  Patient needs to be seen because per 4/9/19 office visit note, patient was advised to reutn in 6 months.    PHQ-9 score:    PHQ-9 SCORE 4/9/2019   PHQ-9 Total Score MyChart -   PHQ-9 Total Score 0     Next 5 appointments (look out 90 days)    Feb 03, 2020  8:00 AM CST  SHORT with Yi Gottlieb MD  [facility] ([facility]) [address]  [facility] [id number] [phone]

## 2020-02-21 ENCOUNTER — HOSPITAL ENCOUNTER (OUTPATIENT)
Facility: CLINIC | Age: 40
End: 2020-02-21
Attending: PLASTIC SURGERY | Admitting: PLASTIC SURGERY
Payer: COMMERCIAL

## 2020-03-23 NOTE — ANESTHESIA PREPROCEDURE EVALUATION
"  Anesthesia Evaluation     . Pt has had prior anesthetic. Type: General           ROS/MED HX    ENT/Pulmonary:     (+)tobacco use, Past use , . .   (-) sleep apnea   Neurologic:       Cardiovascular: Comment: Palpitations in distant history, not active    (+) ----. : . . . :. Irregular Heartbeat/Palpitations, . Previous cardiac testing date:results:date: results:ECG reviewed date:5/2018 results:NSR date: results:          METS/Exercise Tolerance:     Hematologic:         Musculoskeletal:  - neg musculoskeletal ROS       GI/Hepatic:         Renal/Genitourinary:  - ROS Renal section negative       Endo:  - neg endo ROS       Psychiatric:         Infectious Disease:         Malignancy:   (+)   BRCA1 gene positive with prophylactic bilateral mastectomy in the past        Other:                     Physical Exam  Normal systems: dental    Airway   Mallampati: II  TM distance: >3 FB  Neck ROM: full    Dental     Cardiovascular   Rhythm and rate: regular      Pulmonary    breath sounds clear to auscultation                    Anesthesia Plan      History & Physical Review  History and physical reviewed and following examination; no interval change.    ASA Status:  2 .    NPO Status:  > 8 hours    Plan for General and LMA (LMA flex #4, if available) with Intravenous induction. Maintenance will be Inhalation.    PONV prophylaxis:  Ondansetron (or other 5HT-3), Dexamethasone or Solumedrol and Scopolamine patch  Patient notes sore throat after previous anesthetic      Postoperative Care  Postoperative pain management:  IV analgesics.      Consents  Anesthetic plan, risks, benefits and alternatives discussed with:  Patient..          Procedure: Procedure(s):  REVISE RECONSTRUCTED BREAST BILATERAL  Preop diagnosis: STATUS POST BILATERAL MASTECTOMY     Allergies   Allergen Reactions     Atenolol Other (See Comments)     \"sleeplessness; hallucinations\"     Past Medical History:   Diagnosis Date     Abnormal Pap smear 2010    CRYO "     BRCA gene mutation positive in female     BRCA 1     H/O viral meningitis      Irregular heart beat     past hx of irregular heart rate-episodes of ?v-tach,rare now, controlled with relaxtion and cough     Major depressive disorder, single episode, moderate (H)      Sprained ankle 2009     Past Surgical History:   Procedure Laterality Date      SECTION  3/15/2013    Procedure:  SECTION;   Section for failure to descend;  Surgeon: Avelina Steele MD;  Location: [facility]     GYN SURGERY      cryotherapy cervical      HYSTROSALINGOGRAM[       MASTECTOMY SIMPLE BILATERAL, SENTINEL NODE BILATERAL, COMBINED Bilateral 2018    Procedure: COMBINED MASTECTOMY SIMPLE BILATERAL, SENTINEL NODE BILATERAL;  Bilateral prophylactic total mastectomies, free nipple graft, bilateral tissue expanders ;  Surgeon: Dayana Carter MD;  Location: [facility] OR     RECONSTRUCT BREAST, INSERT TISSUE EXPANDER BILATERAL, COMBINED Bilateral 2018    Procedure: COMBINED RECONSTRUCT BREAST, INSERT TISSUE EXPANDER BILATERAL;;  Surgeon: Clara Riddle MD;  Location: [facility] OR     TONSILLECTOMY & ADENOIDECTOMY  2004     wisdom teeth removal       Social History   Substance Use Topics     Smoking status: Former Smoker     Quit date: 2012     Smokeless tobacco: Never Used     Alcohol use Yes      Comment: 3-5 drinks/week     Prior to Admission medications    Medication Sig Start Date End Date Taking? Authorizing Provider   Cetirizine HCl (ZYRTEC ALLERGY PO) Take 5 mg by mouth daily     Reported, Patient   citalopram (CELEXA) 20 MG tablet TAKE 1 TABLET(20 MG) BY MOUTH AT BEDTIME 18   Yi Gottlieb MD   citalopram (CELEXA) 20 MG tablet TAKE 1 TABLET(20 MG) BY MOUTH AT BEDTIME 18   Jacqueline Dodd APRN CNP   oxyCODONE IR (ROXICODONE) 5 MG tablet Take 1-2 tablets (5-10 mg) by mouth every 4 hours as needed for moderate to severe pain 18   Asmita Ramos PA-C   triamcinolone  (KENALOG) 0.1 % cream Apply sparingly to affected area three times daily for 14 days. 11/24/15   Yi Gottlieb MD     No current Epic-ordered facility-administered medications on file.      Current Outpatient Prescriptions Ordered in Epic   Medication     Cetirizine HCl (ZYRTEC ALLERGY PO)     citalopram (CELEXA) 20 MG tablet     citalopram (CELEXA) 20 MG tablet     oxyCODONE IR (ROXICODONE) 5 MG tablet     triamcinolone (KENALOG) 0.1 % cream       Wt Readings from Last 1 Encounters:   05/29/18 73.9 kg (163 lb)     Temp Readings from Last 1 Encounters:   05/30/18 36.1  C (97  F) (Axillary)     BP Readings from Last 6 Encounters:   05/30/18 102/52   05/17/18 102/76   03/19/18 110/70   10/10/17 120/72   03/21/17 102/68   05/18/16 114/74     Pulse Readings from Last 4 Encounters:   05/30/18 66   05/17/18 80   03/19/18 80   10/10/17 82     Resp Readings from Last 1 Encounters:   05/30/18 16     SpO2 Readings from Last 1 Encounters:   05/30/18 97%     Recent Labs   Lab Test  05/29/18   0856  05/18/16   0906  09/06/15   0759   NA   --   142  143   POTASSIUM  3.8  4.3  4.1   CHLORIDE   --   108  110*   CO2   --   25  27   ANIONGAP   --   9  6   GLC   --   76  88   BUN   --   8  10   CR  0.69  0.65  0.61   SILVERIO   --   8.7  8.0*          None

## 2020-03-24 DIAGNOSIS — F32.1 MAJOR DEPRESSIVE DISORDER, SINGLE EPISODE, MODERATE (H): ICD-10-CM

## 2020-03-24 RX ORDER — CITALOPRAM HYDROBROMIDE 20 MG/1
20 TABLET ORAL AT BEDTIME
Qty: 30 TABLET | Refills: 0 | Status: SHIPPED | OUTPATIENT
Start: 2020-03-24 | End: 2020-03-25

## 2020-03-24 NOTE — TELEPHONE ENCOUNTER
PHQ-9 score:    PHQ 4/9/2019   PHQ-9 Total Score 0   Q9: Thoughts of better off dead/self-harm past 2 weeks Not at all     Routing refill request to provider for review/approval because:  Raya given x1 and patient did not follow up, please advise

## 2020-03-24 NOTE — TELEPHONE ENCOUNTER
"Requested Prescriptions   Pending Prescriptions Disp Refills     citalopram (CELEXA) 20 MG tablet 90 tablet 0     Sig: Take 1 tablet (20 mg) by mouth At Bedtime  Last Written Prescription Date:  12/02/19  Last Fill Quantity: 90,  # refills: 0   Last office visit: 4/9/2019 with prescribing provider:  Chery   Future Office Visit:         SSRIs Protocol Failed - 3/24/2020 11:39 AM        Failed - PHQ-9 score less than 5 in past 6 months     Please review last PHQ-9 score.           Failed - Recent (6 mo) or future (30 days) visit within the authorizing provider's specialty     Patient had office visit in the last 6 months or has a visit in the next 30 days with authorizing provider or within the authorizing provider's specialty.  See \"Patient Info\" tab in inbasket, or \"Choose Columns\" in Meds & Orders section of the refill encounter.            Passed - Medication is active on med list        Passed - Patient is age 18 or older        Passed - No active pregnancy on record        Passed - No positive pregnancy test in last 12 months           "

## 2020-03-25 ENCOUNTER — VIRTUAL VISIT (OUTPATIENT)
Dept: INTERNAL MEDICINE | Facility: CLINIC | Age: 40
End: 2020-03-25
Payer: COMMERCIAL

## 2020-03-25 DIAGNOSIS — F32.1 MAJOR DEPRESSIVE DISORDER, SINGLE EPISODE, MODERATE (H): ICD-10-CM

## 2020-03-25 DIAGNOSIS — Z15.09 BRCA GENE POSITIVE: ICD-10-CM

## 2020-03-25 DIAGNOSIS — N83.209 CYST OF OVARY, UNSPECIFIED LATERALITY: Primary | ICD-10-CM

## 2020-03-25 DIAGNOSIS — Z15.01 BRCA GENE POSITIVE: ICD-10-CM

## 2020-03-25 DIAGNOSIS — R10.31 RIGHT LOWER QUADRANT PAIN: ICD-10-CM

## 2020-03-25 PROCEDURE — 99214 OFFICE O/P EST MOD 30 MIN: CPT | Mod: TEL | Performed by: INTERNAL MEDICINE

## 2020-03-25 PROCEDURE — 96127 BRIEF EMOTIONAL/BEHAV ASSMT: CPT | Performed by: INTERNAL MEDICINE

## 2020-03-25 RX ORDER — CITALOPRAM HYDROBROMIDE 20 MG/1
20 TABLET ORAL AT BEDTIME
Qty: 90 TABLET | Refills: 1 | Status: SHIPPED | OUTPATIENT
Start: 2020-03-25 | End: 2020-10-21

## 2020-03-25 ASSESSMENT — PATIENT HEALTH QUESTIONNAIRE - PHQ9
5. POOR APPETITE OR OVEREATING: NOT AT ALL
SUM OF ALL RESPONSES TO PHQ QUESTIONS 1-9: 1

## 2020-03-25 ASSESSMENT — ANXIETY QUESTIONNAIRES
3. WORRYING TOO MUCH ABOUT DIFFERENT THINGS: NOT AT ALL
GAD7 TOTAL SCORE: 1
7. FEELING AFRAID AS IF SOMETHING AWFUL MIGHT HAPPEN: NOT AT ALL
5. BEING SO RESTLESS THAT IT IS HARD TO SIT STILL: NOT AT ALL
2. NOT BEING ABLE TO STOP OR CONTROL WORRYING: NOT AT ALL
1. FEELING NERVOUS, ANXIOUS, OR ON EDGE: NOT AT ALL
IF YOU CHECKED OFF ANY PROBLEMS ON THIS QUESTIONNAIRE, HOW DIFFICULT HAVE THESE PROBLEMS MADE IT FOR YOU TO DO YOUR WORK, TAKE CARE OF THINGS AT HOME, OR GET ALONG WITH OTHER PEOPLE: NOT DIFFICULT AT ALL
6. BECOMING EASILY ANNOYED OR IRRITABLE: SEVERAL DAYS

## 2020-03-25 NOTE — PROGRESS NOTES
Telephone visit:    A:.  6-8 months ago she started to have symptoms.  She has right lower abdominal twinges intermittently.  No urinary symptoms.  No change in bowel movements. She has not had any vaginal spotting.    She feels like it is not musculoskeletal.    Requesting U/S to check on ovarian cysts, right side pain currently-intermittently.  H/o BRCA gene positive.     Depression.  Also doing med check for citalopram, no concerns with this.  PHQ 2019 2019 3/25/2020   PHQ-9 Total Score 0 0 1   Q9: Thoughts of better off dead/self-harm past 2 weeks Not at all Not at all Not at all     BOYD-7 SCORE 3/21/2017 2018 3/25/2020   Total Score 0 0 1         Patient Active Problem List   Diagnosis     Supervision of normal pregnancy      delivery delivered     Meningitis     Viral meningitis     Major depressive disorder, single episode, moderate (H)     BRCA gene positive     Acquired absence of both breasts and nipples     Past Surgical History:   Procedure Laterality Date      SECTION  3/15/2013    Procedure:  SECTION;   Section for failure to descend;  Surgeon: Avelina Steele MD;  Location: [facility]     GRAFT FAT TO BREAST N/A 2018    Procedure: FAT GRAFTING FROM ABDOMEN TO BILATERAL BREAST;  Surgeon: Clara Riddle MD;  Location: [facility]     GYN SURGERY      cryotherapy cervical      HYSTROSALINGOGRAM[       MASTECTOMY SIMPLE BILATERAL, SENTINEL NODE BILATERAL, COMBINED Bilateral 2018    Procedure: COMBINED MASTECTOMY SIMPLE BILATERAL, SENTINEL NODE BILATERAL;  Bilateral prophylactic total mastectomies, free nipple graft, bilateral tissue expanders ;  Surgeon: Dayana Carter MD;  Location: [facility]     RECONSTRUCT BREAST BILATERAL, IMPLANT PROSTHESIS BILATERAL, COMBINED Bilateral 2018    Procedure: BILATERAL SECOND STAGE BREAST RECONSTRUCTION WITH SILICONE IMPLANT PLACEMENT;  Surgeon: Clara Riddle MD;  Location: [facility]      RECONSTRUCT BREAST, INSERT TISSUE EXPANDER BILATERAL, COMBINED Bilateral 2018    Procedure: COMBINED RECONSTRUCT BREAST, INSERT TISSUE EXPANDER BILATERAL;;  Surgeon: Clara Riddle MD;  Location: [facility]     REVISE RECONSTRUCTED BREAST BILATERAL Bilateral 2018    Procedure: REVISE RECONSTRUCTED BREAST BILATERAL;  REVISION OF BILATERAL MASTECTOMY INCISIONS ;  Surgeon: Clara Riddle MD;  Location: [facility]     TONSILLECTOMY & ADENOIDECTOMY  2004     wisdom teeth removal         Social History     Tobacco Use     Smoking status: Former Smoker     Last attempt to quit: 2012     Years since quittin.8     Smokeless tobacco: Never Used   Substance Use Topics     Alcohol use: Yes     Comment: 3-5 drinks/week     Family History   Problem Relation Age of Onset     Breast Cancer Mother 37     Cerebrovascular Disease Mother      Substance Abuse Sister      Cerebrovascular Disease Maternal Grandmother      Substance Abuse Paternal Grandmother      Substance Abuse Paternal Grandfather            PROBLEMS TO ADD ON...  -------------------------------------  Reviewed and updated as needed this visit by Provider  Allergies  Meds  Problems         Review of Systems   ROS COMP: CONSTITUTIONAL: NEGATIVE for fever, chills, change in weight  RESP: NEGATIVE for significant cough or SOB  CV: NEGATIVE for chest pain, palpitations or peripheral edema        Objective    There were no vitals taken for this visit.  There is no height or weight on file to calculate BMI.  Physical Exam   Temperature 98.6         Diagnostic Test Results:  Labs reviewed in Epic        Assessment & Plan       (N83.209) Cyst of ovary, unspecified laterality  (primary encounter diagnosis)  Comment: BRCA gene positive  Plan: US Pelvic Complete w Transvaginal            (F32.1) Major depressive disorder, single episode, moderate (H)  Comment: stable  Plan: celexa    (Z15.01,  Z15.09) BRCA gene positive  Comment:   Plan:   Recommend  "following up with ob/gyn to discuss       BMI:   Estimated body mass index is 27.44 kg/m  as calculated from the following:    Height as of 4/9/19: 1.676 m (5' 6\").    Weight as of 4/9/19: 77.1 kg (170 lb).           See Patient Instructions    Return in about 6 months (around 9/25/2020).    Yi Gottlieb MD  [facility]    Telephone visit: 14 minutes        "

## 2020-03-26 ASSESSMENT — ANXIETY QUESTIONNAIRES: GAD7 TOTAL SCORE: 1

## 2020-04-27 ENCOUNTER — ANCILLARY PROCEDURE (OUTPATIENT)
Dept: ULTRASOUND IMAGING | Facility: CLINIC | Age: 40
End: 2020-04-27
Attending: INTERNAL MEDICINE
Payer: COMMERCIAL

## 2020-04-27 DIAGNOSIS — N83.209 CYST OF OVARY, UNSPECIFIED LATERALITY: ICD-10-CM

## 2020-04-27 DIAGNOSIS — R10.31 RIGHT LOWER QUADRANT PAIN: ICD-10-CM

## 2020-04-27 DIAGNOSIS — Z15.01 BRCA GENE POSITIVE: ICD-10-CM

## 2020-04-27 DIAGNOSIS — Z15.09 BRCA GENE POSITIVE: ICD-10-CM

## 2020-04-27 PROCEDURE — 76856 US EXAM PELVIC COMPLETE: CPT | Performed by: OBSTETRICS & GYNECOLOGY

## 2020-04-27 PROCEDURE — 76830 TRANSVAGINAL US NON-OB: CPT | Performed by: OBSTETRICS & GYNECOLOGY

## 2020-05-27 DIAGNOSIS — Z11.59 ENCOUNTER FOR SCREENING FOR OTHER VIRAL DISEASES: Primary | ICD-10-CM

## 2020-06-11 ENCOUNTER — OFFICE VISIT (OUTPATIENT)
Dept: INTERNAL MEDICINE | Facility: CLINIC | Age: 40
End: 2020-06-11
Payer: COMMERCIAL

## 2020-06-11 VITALS
WEIGHT: 179 LBS | HEART RATE: 70 BPM | OXYGEN SATURATION: 98 % | BODY MASS INDEX: 28.77 KG/M2 | DIASTOLIC BLOOD PRESSURE: 70 MMHG | HEIGHT: 66 IN | RESPIRATION RATE: 16 BRPM | SYSTOLIC BLOOD PRESSURE: 120 MMHG | TEMPERATURE: 98.5 F

## 2020-06-11 DIAGNOSIS — Z01.818 PREOP GENERAL PHYSICAL EXAM: Primary | ICD-10-CM

## 2020-06-11 LAB
ERYTHROCYTE [DISTWIDTH] IN BLOOD BY AUTOMATED COUNT: 12.7 % (ref 10–15)
HCT VFR BLD AUTO: 40.8 % (ref 35–47)
HGB BLD-MCNC: 13 G/DL (ref 11.7–15.7)
MCH RBC QN AUTO: 28.4 PG (ref 26.5–33)
MCHC RBC AUTO-ENTMCNC: 31.9 G/DL (ref 31.5–36.5)
MCV RBC AUTO: 89 FL (ref 78–100)
PLATELET # BLD AUTO: 217 10E9/L (ref 150–450)
RBC # BLD AUTO: 4.58 10E12/L (ref 3.8–5.2)
WBC # BLD AUTO: 5.4 10E9/L (ref 4–11)

## 2020-06-11 PROCEDURE — 99214 OFFICE O/P EST MOD 30 MIN: CPT | Performed by: NURSE PRACTITIONER

## 2020-06-11 PROCEDURE — 36415 COLL VENOUS BLD VENIPUNCTURE: CPT | Performed by: NURSE PRACTITIONER

## 2020-06-11 PROCEDURE — 80048 BASIC METABOLIC PNL TOTAL CA: CPT | Performed by: NURSE PRACTITIONER

## 2020-06-11 PROCEDURE — 85027 COMPLETE CBC AUTOMATED: CPT | Performed by: NURSE PRACTITIONER

## 2020-06-11 ASSESSMENT — MIFFLIN-ST. JEOR: SCORE: 1508.69

## 2020-06-11 NOTE — PROGRESS NOTES
[place] [address]  [facility] [address]  [phone]  Dept: [phone]    PRE-OP EVALUATION:  Today's date: 2020    Gayle Moya (: 1980) presents for pre-operative evaluation assessment as requested by Dr. Miner .  She requires evaluation and anesthesia risk assessment prior to undergoing surgery/procedure for treatment of  Bilateral breast implant reconstruction .    Fax number for surgical facility: [facility]  Primary Physician: Yi Gottlieb  Type of Anesthesia Anticipated: to be determined    Patient has a Health Care Directive or Living Will:  YES     Preop Questions 2020   Who is doing your surgery? dr perez   What are you having done? breast  implant surgery   Date of Surgery/Procedure: 06 15 2020   Facility or Hospital where procedure/surgery will be performed: [facility]   1.  Do you have a history of Heart attack, stroke, stent, coronary bypass surgery, or other heart surgery? No   2.  Do you ever have any pain or discomfort in your chest? No   3.  Do you have a history of  Heart Failure? No   4.   Are you troubled by shortness of breath when:  walking on a level surface, or up a slight hill, or at night? No   5.  Do you currently have a cold, bronchitis or other respiratory infection? No   6.  Do you have a cough, shortness of breath, or wheezing? No   7.  Do you sometimes get pains in the calves of your legs when you walk? No   8. Do you or anyone in your family have previous history of blood clots? No   9.  Do you or does anyone in your family have a serious bleeding problem such as prolonged bleeding following surgeries or cuts? No   10. Have you ever had problems with anemia or been told to take iron pills? No   11. Have you had any abnormal blood loss such as black, tarry or bloody stools, or abnormal vaginal bleeding? No   12. Have you ever had a blood transfusion? No   13. Have you or any of your relatives ever had problems with  anesthesia? No   14. Do you have sleep apnea, excessive snoring or daytime drowsiness? No   15. Do you have any prosthetic heart valves? No   16. Do you have prosthetic joints? No   17. Is there any chance that you may be pregnant? No         HPI:     HPI related to upcoming procedure: Bilateral breast implant reconstruction      See problem list for active medical problems.  Problems all longstanding and stable, except as noted/documented.  See ROS for pertinent symptoms related to these conditions.      MEDICAL HISTORY:     Patient Active Problem List    Diagnosis Date Noted     Acquired absence of both breasts and nipples 2018     Priority: Medium     BRCA gene positive 2016     Priority: Medium     Major depressive disorder, single episode, moderate (H) 2015     Priority: Medium     Viral meningitis 2015     Priority: Medium     Meningitis 2015     Priority: Medium      delivery delivered 03/15/2013     Priority: Medium     Supervision of normal pregnancy 2013     Priority: Medium      Past Medical History:   Diagnosis Date     Abnormal Pap smear     CRYO     BRCA gene mutation positive in female     BRCA 1     H/O viral meningitis      Heart murmur      Irregular heart beat     past hx of irregular heart rate-episodes of ?v-tach,rare now, controlled with relaxtion and cough     Major depressive disorder, single episode, moderate (H)      MVP (mitral valve prolapse)     diagnosised at age 20     Sprained ankle      Past Surgical History:   Procedure Laterality Date      SECTION  3/15/2013    Procedure:  SECTION;   Section for failure to descend;  Surgeon: Avelina Steele MD;  Location: [facility]     GRAFT FAT TO BREAST N/A 2018    Procedure: FAT GRAFTING FROM ABDOMEN TO BILATERAL BREAST;  Surgeon: Clara Riddle MD;  Location: [facility]     GYN SURGERY      cryotherapy cervical      HYSTROSALINGOGRAM[       MASTECTOMY  "SIMPLE BILATERAL, SENTINEL NODE BILATERAL, COMBINED Bilateral 2018    Procedure: COMBINED MASTECTOMY SIMPLE BILATERAL, SENTINEL NODE BILATERAL;  Bilateral prophylactic total mastectomies, free nipple graft, bilateral tissue expanders ;  Surgeon: Dayana Carter MD;  Location: [facility]     RECONSTRUCT BREAST BILATERAL, IMPLANT PROSTHESIS BILATERAL, COMBINED Bilateral 2018    Procedure: BILATERAL SECOND STAGE BREAST RECONSTRUCTION WITH SILICONE IMPLANT PLACEMENT;  Surgeon: Clara Riddle MD;  [facility]     RECONSTRUCT BREAST, INSERT TISSUE EXPANDER BILATERAL, COMBINED Bilateral 2018    Procedure: COMBINED RECONSTRUCT BREAST, INSERT TISSUE EXPANDER BILATERAL;;  Surgeon: Clara Riddle MD;  Location: [facility]     REVISE RECONSTRUCTED BREAST BILATERAL Bilateral 2018    Procedure: REVISE RECONSTRUCTED BREAST BILATERAL;  REVISION OF BILATERAL MASTECTOMY INCISIONS ;  Surgeon: Clara Riddle MD;  Location: [facility]     TONSILLECTOMY & ADENOIDECTOMY  2004     wisdom teeth removal       Current Outpatient Medications   Medication Sig Dispense Refill     citalopram (CELEXA) 20 MG tablet Take 1 tablet (20 mg) by mouth At Bedtime 90 tablet 1     multivitamin w/minerals (MULTI-VITAMIN) tablet Take 1 tablet by mouth daily       triamcinolone (KENALOG) 0.1 % cream Apply sparingly to affected area three times daily for 14 days. 15 g 0     Cetirizine HCl (ZYRTEC ALLERGY PO) Take 5 mg by mouth daily        OTC products: None, except as noted above    Allergies   Allergen Reactions     Atenolol Other (See Comments)     \"sleeplessness; hallucinations\"      Latex Allergy: NO    Social History     Tobacco Use     Smoking status: Former Smoker     Last attempt to quit: 2012     Years since quittin.0     Smokeless tobacco: Never Used   Substance Use Topics     Alcohol use: Yes     Comment: 3-5 drinks/week     History   Drug Use No       REVIEW OF SYSTEMS:   CONSTITUTIONAL: " "NEGATIVE for fever, chills, change in weight  INTEGUMENTARY/SKIN: NEGATIVE for worrisome rashes, moles or lesions  EYES: NEGATIVE for vision changes or irritation  ENT/MOUTH: NEGATIVE for ear, mouth and throat problems  RESP: NEGATIVE for significant cough or SOB  CV: NEGATIVE for chest pain, palpitations or peripheral edema  GI: NEGATIVE for nausea, abdominal pain, heartburn, or change in bowel habits  : NEGATIVE for frequency, dysuria, or hematuria  MUSCULOSKELETAL: NEGATIVE for significant arthralgias or myalgia  NEURO: NEGATIVE for weakness, dizziness or paresthesias  ENDOCRINE: NEGATIVE for temperature intolerance, skin/hair changes  HEME: NEGATIVE for bleeding problems  PSYCHIATRIC: NEGATIVE for changes in mood or affect    EXAM:   /70   Pulse 70   Temp 98.5  F (36.9  C) (Oral)   Resp 16   Ht 1.676 m (5' 6\")   Wt 81.2 kg (179 lb)   LMP 06/03/2020   SpO2 98%   BMI 28.89 kg/m        GENERAL APPEARANCE: healthy, alert and no distress     NECK: no adenopathy, no asymmetry, masses, or scars and thyroid normal to palpation     RESP: lungs clear to auscultation - no rales, rhonchi or wheezes     CV: regular rates and rhythm, normal S1 S2, no S3 or S4 and no murmur, click or rub     ABDOMEN:  soft, nontender, no HSM or masses and bowel sounds normal     MS: extremities normal- no gross deformities noted, no evidence of inflammation in joints, FROM in all extremities.     NEURO: Normal strength and tone, sensory exam grossly normal, mentation intact and speech normal     PSYCH: mentation appears normal. and affect normal/bright     LYMPHATICS: No cervical adenopathy    DIAGNOSTICS:   EKG: Not indicated due to non-vascular surgery and low risk of event (age <65 and without cardiac risk factors)    Recent Labs   Lab Test 04/09/19  1453 11/09/18  0922 05/29/18  0856 03/21/17  1645 05/18/16  0906 09/06/15  0759   HGB 13.5 12.8  --  13.3 13.3 11.3*   PLT  --   --   --  242 188 130*   NA  --   --   --   --  " 142 143   POTASSIUM  --   --  3.8  --  4.3 4.1   CR  --   --  0.69  --  0.65 0.61        IMPRESSION:   Reason for surgery/procedure: Bilateral breast implant reconstruction    The proposed surgical procedure is considered INTERMEDIATE risk.    REVISED CARDIAC RISK INDEX  The patient has the following serious cardiovascular risks for perioperative complications such as (MI, PE, VFib and 3  AV Block):  No serious cardiac risks  INTERPRETATION: 0 risks: Class I (very low risk - 0.4% complication rate)    The patient has the following additional risks for perioperative complications:  No identified additional risks    (Z01.818) Preop general physical exam  (primary encounter diagnosis)  Comment:   Plan: CBC with platelets, Basic metabolic panel                RECOMMENDATIONS:     --Consult hospital rounder / IM to assist post-op medical management    --Patient is to take all scheduled medications on the day of surgery EXCEPT for modifications listed below.    APPROVAL GIVEN to proceed with proposed procedure, without further diagnostic evaluation       Signed Electronically by: Shefali Beyer NP    Copy of this evaluation report is provided to requesting physician.    [facility] Preop Guidelines    Revised Cardiac Risk Index

## 2020-06-11 NOTE — LETTER
Vira 15, 2020      Gayle Moya  [address] [address] [place] [id number]        Dear ,    We are writing to inform you of your test results.    Your test results fall within the expected range(s) or remain unchanged from previous results.  Please continue with current treatment plan.    Resulted Orders   CBC with platelets   Result Value Ref Range    WBC 5.4 4.0 - 11.0 10e9/L    RBC Count 4.58 3.8 - 5.2 10e12/L    Hemoglobin 13.0 11.7 - 15.7 g/dL    Hematocrit 40.8 35.0 - 47.0 %    MCV 89 78 - 100 fl    MCH 28.4 26.5 - 33.0 pg    MCHC 31.9 31.5 - 36.5 g/dL    RDW 12.7 10.0 - 15.0 %    Platelet Count 217 150 - 450 10e9/L   Basic metabolic panel   Result Value Ref Range    Sodium 138 133 - 144 mmol/L    Potassium 4.7 3.4 - 5.3 mmol/L    Chloride 108 94 - 109 mmol/L    Carbon Dioxide 25 20 - 32 mmol/L    Anion Gap 5 3 - 14 mmol/L    Glucose 87 70 - 99 mg/dL      Comment:      Fasting specimen    Urea Nitrogen 10 7 - 30 mg/dL    Creatinine 0.58 0.52 - 1.04 mg/dL    GFR Estimate >90 >60 mL/min/[1.73_m2]      Comment:      Non  GFR Calc  Starting 12/18/2018, serum creatinine based estimated GFR (eGFR) will be   calculated using the Chronic Kidney Disease Epidemiology Collaboration   (CKD-EPI) equation.      GFR Estimate If Black >90 >60 mL/min/[1.73_m2]      Comment:       GFR Calc  Starting 12/18/2018, serum creatinine based estimated GFR (eGFR) will be   calculated using the Chronic Kidney Disease Epidemiology Collaboration   (CKD-EPI) equation.      Calcium 9.1 8.5 - 10.1 mg/dL       If you have any questions or concerns, please call the clinic at the number listed above.       Sincerely,        Shefali Beyer NP

## 2020-06-12 LAB
ANION GAP SERPL CALCULATED.3IONS-SCNC: 5 MMOL/L (ref 3–14)
BUN SERPL-MCNC: 10 MG/DL (ref 7–30)
CALCIUM SERPL-MCNC: 9.1 MG/DL (ref 8.5–10.1)
CHLORIDE SERPL-SCNC: 108 MMOL/L (ref 94–109)
CO2 SERPL-SCNC: 25 MMOL/L (ref 20–32)
CREAT SERPL-MCNC: 0.58 MG/DL (ref 0.52–1.04)
GFR SERPL CREATININE-BSD FRML MDRD: >90 ML/MIN/{1.73_M2}
GLUCOSE SERPL-MCNC: 87 MG/DL (ref 70–99)
POTASSIUM SERPL-SCNC: 4.7 MMOL/L (ref 3.4–5.3)
SODIUM SERPL-SCNC: 138 MMOL/L (ref 133–144)

## 2020-06-13 DIAGNOSIS — Z11.59 ENCOUNTER FOR SCREENING FOR OTHER VIRAL DISEASES: ICD-10-CM

## 2020-06-13 PROCEDURE — 99000 SPECIMEN HANDLING OFFICE-LAB: CPT | Performed by: PLASTIC SURGERY

## 2020-06-13 PROCEDURE — 99207 ZZC NO BILLABLE SERVICE THIS VISIT: CPT

## 2020-06-13 PROCEDURE — U0003 INFECTIOUS AGENT DETECTION BY NUCLEIC ACID (DNA OR RNA); SEVERE ACUTE RESPIRATORY SYNDROME CORONAVIRUS 2 (SARS-COV-2) (CORONAVIRUS DISEASE [COVID-19]), AMPLIFIED PROBE TECHNIQUE, MAKING USE OF HIGH THROUGHPUT TECHNOLOGIES AS DESCRIBED BY CMS-2020-01-R: HCPCS | Performed by: PLASTIC SURGERY

## 2020-06-14 ENCOUNTER — ANESTHESIA EVENT (OUTPATIENT)
Dept: SURGERY | Facility: CLINIC | Age: 40
End: 2020-06-14
Payer: COMMERCIAL

## 2020-06-14 LAB
SARS-COV-2 RNA SPEC QL NAA+PROBE: NOT DETECTED
SPECIMEN SOURCE: NORMAL

## 2020-06-15 ENCOUNTER — ANESTHESIA (OUTPATIENT)
Dept: SURGERY | Facility: CLINIC | Age: 40
End: 2020-06-15
Payer: COMMERCIAL

## 2020-06-15 ENCOUNTER — HOSPITAL ENCOUNTER (OUTPATIENT)
Facility: CLINIC | Age: 40
Discharge: HOME | End: 2020-06-15
Attending: PLASTIC SURGERY | Admitting: PLASTIC SURGERY
Payer: COMMERCIAL

## 2020-06-15 VITALS
OXYGEN SATURATION: 100 % | BODY MASS INDEX: 27.75 KG/M2 | DIASTOLIC BLOOD PRESSURE: 85 MMHG | WEIGHT: 171.9 LBS | RESPIRATION RATE: 10 BRPM | SYSTOLIC BLOOD PRESSURE: 142 MMHG | HEART RATE: 66 BPM | TEMPERATURE: 98.8 F

## 2020-06-15 DIAGNOSIS — Z90.13 ACQUIRED ABSENCE OF BOTH BREASTS AND NIPPLES: Primary | ICD-10-CM

## 2020-06-15 LAB — HCG UR QL: NEGATIVE

## 2020-06-15 PROCEDURE — 25000125 ZZHC RX 250: Performed by: PLASTIC SURGERY

## 2020-06-15 PROCEDURE — 25000125 ZZHC RX 250: Performed by: NURSE ANESTHETIST, CERTIFIED REGISTERED

## 2020-06-15 PROCEDURE — 71000027 ZZH RECOVERY PHASE 2 EACH 15 MINS: Performed by: PLASTIC SURGERY

## 2020-06-15 PROCEDURE — 37000008 ZZH ANESTHESIA TECHNICAL FEE, 1ST 30 MIN: Performed by: PLASTIC SURGERY

## 2020-06-15 PROCEDURE — 25000128 H RX IP 250 OP 636: Performed by: PLASTIC SURGERY

## 2020-06-15 PROCEDURE — 88300 SURGICAL PATH GROSS: CPT | Mod: 26,59 | Performed by: PLASTIC SURGERY

## 2020-06-15 PROCEDURE — 25000128 H RX IP 250 OP 636: Performed by: NURSE ANESTHETIST, CERTIFIED REGISTERED

## 2020-06-15 PROCEDURE — 71000013 ZZH RECOVERY PHASE 1 LEVEL 1 EA ADDTL HR: Performed by: PLASTIC SURGERY

## 2020-06-15 PROCEDURE — 36000058 ZZH SURGERY LEVEL 3 EA 15 ADDTL MIN: Performed by: PLASTIC SURGERY

## 2020-06-15 PROCEDURE — 37000009 ZZH ANESTHESIA TECHNICAL FEE, EACH ADDTL 15 MIN: Performed by: PLASTIC SURGERY

## 2020-06-15 PROCEDURE — 36000056 ZZH SURGERY LEVEL 3 1ST 30 MIN: Performed by: PLASTIC SURGERY

## 2020-06-15 PROCEDURE — 25000128 H RX IP 250 OP 636: Performed by: ANESTHESIOLOGY

## 2020-06-15 PROCEDURE — 25000566 ZZH SEVOFLURANE, EA 15 MIN: Performed by: PLASTIC SURGERY

## 2020-06-15 PROCEDURE — 40000170 ZZH STATISTIC PRE-PROCEDURE ASSESSMENT II: Performed by: PLASTIC SURGERY

## 2020-06-15 PROCEDURE — 71000012 ZZH RECOVERY PHASE 1 LEVEL 1 FIRST HR: Performed by: PLASTIC SURGERY

## 2020-06-15 PROCEDURE — L8600 IMPLANT BREAST SILICONE/EQ: HCPCS | Performed by: PLASTIC SURGERY

## 2020-06-15 PROCEDURE — 81025 URINE PREGNANCY TEST: CPT | Performed by: PLASTIC SURGERY

## 2020-06-15 PROCEDURE — 27210794 ZZH OR GENERAL SUPPLY STERILE: Performed by: PLASTIC SURGERY

## 2020-06-15 PROCEDURE — 25000125 ZZHC RX 250: Performed by: ANESTHESIOLOGY

## 2020-06-15 PROCEDURE — 25000132 ZZH RX MED GY IP 250 OP 250 PS 637: Performed by: PLASTIC SURGERY

## 2020-06-15 PROCEDURE — 25800030 ZZH RX IP 258 OP 636: Performed by: ANESTHESIOLOGY

## 2020-06-15 PROCEDURE — 88300 SURGICAL PATH GROSS: CPT | Performed by: PLASTIC SURGERY

## 2020-06-15 DEVICE — IMPLANTABLE DEVICE: Type: IMPLANTABLE DEVICE | Site: BREAST | Status: FUNCTIONAL

## 2020-06-15 RX ORDER — ONDANSETRON 2 MG/ML
4 INJECTION INTRAMUSCULAR; INTRAVENOUS EVERY 30 MIN PRN
Status: DISCONTINUED | OUTPATIENT
Start: 2020-06-15 | End: 2020-06-15 | Stop reason: HOSPADM

## 2020-06-15 RX ORDER — KETOROLAC TROMETHAMINE 30 MG/ML
30 INJECTION, SOLUTION INTRAMUSCULAR; INTRAVENOUS ONCE
Status: COMPLETED | OUTPATIENT
Start: 2020-06-15 | End: 2020-06-15

## 2020-06-15 RX ORDER — ONDANSETRON 4 MG/1
4 TABLET, ORALLY DISINTEGRATING ORAL EVERY 30 MIN PRN
Status: DISCONTINUED | OUTPATIENT
Start: 2020-06-15 | End: 2020-06-15 | Stop reason: HOSPADM

## 2020-06-15 RX ORDER — HYDROMORPHONE HYDROCHLORIDE 1 MG/ML
.3-.5 INJECTION, SOLUTION INTRAMUSCULAR; INTRAVENOUS; SUBCUTANEOUS EVERY 5 MIN PRN
Status: DISCONTINUED | OUTPATIENT
Start: 2020-06-15 | End: 2020-06-15 | Stop reason: HOSPADM

## 2020-06-15 RX ORDER — FENTANYL CITRATE 50 UG/ML
25-50 INJECTION, SOLUTION INTRAMUSCULAR; INTRAVENOUS
Status: DISCONTINUED | OUTPATIENT
Start: 2020-06-15 | End: 2020-06-15 | Stop reason: HOSPADM

## 2020-06-15 RX ORDER — LIDOCAINE HYDROCHLORIDE 20 MG/ML
INJECTION, SOLUTION INFILTRATION; PERINEURAL PRN
Status: DISCONTINUED | OUTPATIENT
Start: 2020-06-15 | End: 2020-06-15

## 2020-06-15 RX ORDER — NALOXONE HYDROCHLORIDE 0.4 MG/ML
.1-.4 INJECTION, SOLUTION INTRAMUSCULAR; INTRAVENOUS; SUBCUTANEOUS
Status: DISCONTINUED | OUTPATIENT
Start: 2020-06-15 | End: 2020-06-15 | Stop reason: HOSPADM

## 2020-06-15 RX ORDER — SODIUM CHLORIDE, SODIUM LACTATE, POTASSIUM CHLORIDE, CALCIUM CHLORIDE 600; 310; 30; 20 MG/100ML; MG/100ML; MG/100ML; MG/100ML
INJECTION, SOLUTION INTRAVENOUS CONTINUOUS
Status: DISCONTINUED | OUTPATIENT
Start: 2020-06-15 | End: 2020-06-15 | Stop reason: HOSPADM

## 2020-06-15 RX ORDER — BUPIVACAINE HYDROCHLORIDE 2.5 MG/ML
INJECTION, SOLUTION INFILTRATION; PERINEURAL PRN
Status: DISCONTINUED | OUTPATIENT
Start: 2020-06-15 | End: 2020-06-15 | Stop reason: HOSPADM

## 2020-06-15 RX ORDER — FENTANYL CITRATE 50 UG/ML
INJECTION, SOLUTION INTRAMUSCULAR; INTRAVENOUS PRN
Status: DISCONTINUED | OUTPATIENT
Start: 2020-06-15 | End: 2020-06-15

## 2020-06-15 RX ORDER — OXYCODONE AND ACETAMINOPHEN 5; 325 MG/1; MG/1
1 TABLET ORAL ONCE
Status: COMPLETED | OUTPATIENT
Start: 2020-06-15 | End: 2020-06-15

## 2020-06-15 RX ORDER — PROPOFOL 10 MG/ML
INJECTION, EMULSION INTRAVENOUS PRN
Status: DISCONTINUED | OUTPATIENT
Start: 2020-06-15 | End: 2020-06-15

## 2020-06-15 RX ORDER — ACETAMINOPHEN 500 MG
1000 TABLET ORAL ONCE
Status: COMPLETED | OUTPATIENT
Start: 2020-06-15 | End: 2020-06-15

## 2020-06-15 RX ORDER — PROPOFOL 10 MG/ML
INJECTION, EMULSION INTRAVENOUS CONTINUOUS PRN
Status: DISCONTINUED | OUTPATIENT
Start: 2020-06-15 | End: 2020-06-15

## 2020-06-15 RX ORDER — CEFAZOLIN SODIUM 2 G/100ML
2 INJECTION, SOLUTION INTRAVENOUS
Status: COMPLETED | OUTPATIENT
Start: 2020-06-15 | End: 2020-06-15

## 2020-06-15 RX ORDER — OXYCODONE AND ACETAMINOPHEN 5; 325 MG/1; MG/1
1-2 TABLET ORAL EVERY 4 HOURS PRN
Qty: 20 TABLET | Refills: 0 | Status: SHIPPED | OUTPATIENT
Start: 2020-06-15 | End: 2021-10-26

## 2020-06-15 RX ORDER — CEFAZOLIN SODIUM 1 G/3ML
1 INJECTION, POWDER, FOR SOLUTION INTRAMUSCULAR; INTRAVENOUS SEE ADMIN INSTRUCTIONS
Status: DISCONTINUED | OUTPATIENT
Start: 2020-06-15 | End: 2020-06-15 | Stop reason: HOSPADM

## 2020-06-15 RX ORDER — DIPHENHYDRAMINE HYDROCHLORIDE 50 MG/ML
INJECTION INTRAMUSCULAR; INTRAVENOUS PRN
Status: DISCONTINUED | OUTPATIENT
Start: 2020-06-15 | End: 2020-06-15

## 2020-06-15 RX ORDER — DEXAMETHASONE SODIUM PHOSPHATE 4 MG/ML
INJECTION, SOLUTION INTRA-ARTICULAR; INTRALESIONAL; INTRAMUSCULAR; INTRAVENOUS; SOFT TISSUE PRN
Status: DISCONTINUED | OUTPATIENT
Start: 2020-06-15 | End: 2020-06-15

## 2020-06-15 RX ORDER — ONDANSETRON 2 MG/ML
INJECTION INTRAMUSCULAR; INTRAVENOUS PRN
Status: DISCONTINUED | OUTPATIENT
Start: 2020-06-15 | End: 2020-06-15

## 2020-06-15 RX ADMIN — ACETAMINOPHEN 1000 MG: 500 TABLET, FILM COATED ORAL at 08:55

## 2020-06-15 RX ADMIN — MIDAZOLAM 1 MG: 1 INJECTION INTRAMUSCULAR; INTRAVENOUS at 10:55

## 2020-06-15 RX ADMIN — HYDROMORPHONE HYDROCHLORIDE 0.5 MG: 1 INJECTION, SOLUTION INTRAMUSCULAR; INTRAVENOUS; SUBCUTANEOUS at 13:01

## 2020-06-15 RX ADMIN — DIPHENHYDRAMINE HYDROCHLORIDE 12.5 MG: 50 INJECTION, SOLUTION INTRAMUSCULAR; INTRAVENOUS at 11:06

## 2020-06-15 RX ADMIN — HYDROMORPHONE HYDROCHLORIDE 0.5 MG: 1 INJECTION, SOLUTION INTRAMUSCULAR; INTRAVENOUS; SUBCUTANEOUS at 12:01

## 2020-06-15 RX ADMIN — OXYCODONE HYDROCHLORIDE AND ACETAMINOPHEN 1 TABLET: 5; 325 TABLET ORAL at 14:08

## 2020-06-15 RX ADMIN — KETOROLAC TROMETHAMINE 30 MG: 30 INJECTION, SOLUTION INTRAMUSCULAR at 13:02

## 2020-06-15 RX ADMIN — MIDAZOLAM 1 MG: 1 INJECTION INTRAMUSCULAR; INTRAVENOUS at 10:53

## 2020-06-15 RX ADMIN — LIDOCAINE HYDROCHLORIDE 1 ML: 10 INJECTION, SOLUTION EPIDURAL; INFILTRATION; INTRACAUDAL; PERINEURAL at 10:00

## 2020-06-15 RX ADMIN — SODIUM CHLORIDE, POTASSIUM CHLORIDE, SODIUM LACTATE AND CALCIUM CHLORIDE: 600; 310; 30; 20 INJECTION, SOLUTION INTRAVENOUS at 12:33

## 2020-06-15 RX ADMIN — HYDROMORPHONE HYDROCHLORIDE 0.5 MG: 1 INJECTION, SOLUTION INTRAMUSCULAR; INTRAVENOUS; SUBCUTANEOUS at 13:19

## 2020-06-15 RX ADMIN — ONDANSETRON 4 MG: 2 INJECTION INTRAMUSCULAR; INTRAVENOUS at 12:29

## 2020-06-15 RX ADMIN — SODIUM CHLORIDE, POTASSIUM CHLORIDE, SODIUM LACTATE AND CALCIUM CHLORIDE: 600; 310; 30; 20 INJECTION, SOLUTION INTRAVENOUS at 10:53

## 2020-06-15 RX ADMIN — DEXAMETHASONE SODIUM PHOSPHATE 4 MG: 4 INJECTION, SOLUTION INTRA-ARTICULAR; INTRALESIONAL; INTRAMUSCULAR; INTRAVENOUS; SOFT TISSUE at 10:59

## 2020-06-15 RX ADMIN — FENTANYL CITRATE 50 MCG: 50 INJECTION, SOLUTION INTRAMUSCULAR; INTRAVENOUS at 11:14

## 2020-06-15 RX ADMIN — CEFAZOLIN SODIUM 2 G: 2 INJECTION, SOLUTION INTRAVENOUS at 11:06

## 2020-06-15 RX ADMIN — PROPOFOL 50 MCG/KG/MIN: 10 INJECTION, EMULSION INTRAVENOUS at 11:02

## 2020-06-15 RX ADMIN — LIDOCAINE HYDROCHLORIDE 100 MG: 20 INJECTION, SOLUTION INFILTRATION; PERINEURAL at 10:59

## 2020-06-15 RX ADMIN — FENTANYL CITRATE 50 MCG: 50 INJECTION, SOLUTION INTRAMUSCULAR; INTRAVENOUS at 10:59

## 2020-06-15 RX ADMIN — PROPOFOL 200 MG: 10 INJECTION, EMULSION INTRAVENOUS at 10:59

## 2020-06-15 ASSESSMENT — LIFESTYLE VARIABLES: TOBACCO_USE: 1

## 2020-06-15 ASSESSMENT — ENCOUNTER SYMPTOMS: DYSRHYTHMIAS: 1

## 2020-06-15 NOTE — DISCHARGE INSTRUCTIONS
Same Day Surgery Discharge Instructions for  Sedation and General Anesthesia       It's not unusual to feel dizzy, light-headed or faint for up to 24 hours after surgery or while taking pain medication.  If you have these symptoms: sit for a few minutes before standing and have someone assist you when you get up to walk or use the bathroom.      You should rest and relax for the next 24 hours. We recommend you make arrangements to have an adult stay with you for at least 24 hours after your discharge.  Avoid hazardous and strenuous activity.      DO NOT DRIVE any vehicle or operate mechanical equipment for 24 hours following the end of your surgery.  Even though you may feel normal, your reactions may be affected by the medication you have received.      Do not drink alcoholic beverages for 24 hours following surgery.       Slowly progress to your regular diet as you feel able. It's not unusual to feel nauseated and/or vomit after receiving anesthesia.  If you develop these symptoms, drink clear liquids (apple juice, ginger ale, broth, 7-up, etc. ) until you feel better.  If your nausea and vomiting persists for 24 hours, please notify your surgeon.        All narcotic pain medications, along with inactivity and anesthesia, can cause constipation. Drinking plenty of liquids and increasing fiber intake will help.      For any questions of a medical nature, call your surgeon.      Do not make important decisions for 24 hours.      If you had general anesthesia, you may have a sore throat for a couple of days related to the breathing tube used during surgery.  You may use Cepacol lozenges to help with this discomfort.  If it worsens or if you develop a fever, contact your surgeon.       If you feel your pain is not well managed with the pain medications prescribed by your surgeon, please contact your surgeon's office to let them know so they can address your concerns.       CoVid 19 Information    We want to give you  information regarding Covid. Please consult your primary care provider with any questions you might have.     Patient who have symptoms (cough, fever, or shortness of breath), need to isolate for 7 days from when symptoms started OR 72 hours after fever resolves (without fever reducing medications) AND improvement of respiratory symptoms (whichever is longer).      Isolate yourself at home (in own room/own bathroom if possible)    Do Not allow any visitors    Do Not go to work or school    Do Not go to [employer],  centers, shopping, or other public places.    Do Not shake hands.    Avoid close and intimate contact with others (hugging, kissing).    Follow CDC recommendations for household cleaning of frequently touched services.     After the initial 7 days, continue to isolate yourself from household members as much as possible. To continue decrease the risk of community spread and exposure, you and any members of your household should limit activities in public for 14 days after starting home isolation.     You can reference the following CDC link for helpful home isolation/care tips:  https://www.cdc.gov/coronavirus/2019-ncov/downloads/10Things.pdf    Protect Others:    Cover Your Mouth and Nose with a mask, disposable tissue or wash cloth to avoid spreading germs to others.    Wash your hands and face frequently with soap and water    Call Your Primary Doctor If: Breathing difficulty develops or you become worse.    For more information about COVID19 and options for caring for yourself at home, please visit the CDC website at https://www.cdc.gov/coronavirus/2019-ncov/about/steps-when-sick.html  For more options for care at [facility], please visit our website at [email or web address]      Discharge Instructions following Breast Surgery  [facility] Same Day Surgery    Diet:   Resume diet as tolerated.  Drink plenty of fluids to prevent  constipation.    Activity:   Gentle rotation & stretching of your arms/shoulders will prevent stiffness in joints   Increase activity gradually   No heavy lifting greater than 10-15 pounds & no strenuous activity until  approved by surgeon    Bathing/Incision Care:   You may shower as directed by surgeon   Pat incisions dry.  No lotions, powders or perfumes to incisions   Tape dressings (steri strips) will fall off in 7-10 days (if present)    What to expect:   A tingly or itchy sensation around the incision is a normal sign of healing   Some clear, pink drainage from incisions is normal.      Notify your surgeon for the following signs & symptoms:   Redness, warmth, or swelling of the incision    Foul smelling or increased drainage   Chills or temperature greater than 101 F   Pain not controlled by pain medications    Today you received Toradol, an antiinflammatory medication similar to Ibuprofen.  You should not take other antiinflammatory medication, such as Ibuprofen, Motrin, Advil, Aleve, Naprosyn, etc until 7PM

## 2020-06-15 NOTE — ANESTHESIA POSTPROCEDURE EVALUATION
Patient: Gayle Moya    Procedure(s):  REVISION BILATERAL SILICONE BREAST RECONSTRUCTION WITH  bilateral silcone implants     IMPLANT REPLACEMENT    Diagnosis:Status post partial mastectomy, bilateral [Z90.13]  Diagnosis Additional Information: No value filed.    Anesthesia Type:  General    Note:  Anesthesia Post Evaluation    Patient location during evaluation: PACU  Patient participation: Able to fully participate in evaluation  Level of consciousness: awake and alert  Pain management: adequate  Airway patency: patent  Cardiovascular status: acceptable  Respiratory status: acceptable  Hydration status: acceptable  PONV: none     Anesthetic complications: None          Last vitals:  Vitals:    06/15/20 1330 06/15/20 1345 06/15/20 1408   BP: (!) 139/94 (!) 142/85    Pulse: 72 66    Resp: 22 10    Temp:  37.1  C (98.8  F)    SpO2: 100% 100% 100%         Electronically Signed By: Roel Cerna MD  Vira 15, 2020  2:21 PM

## 2020-06-15 NOTE — ANESTHESIA CARE TRANSFER NOTE
Patient: Gayle Moya    Procedure(s):  REVISION BILATERAL SILICONE BREAST RECONSTRUCTION WITH  bilateral silcone implants     IMPLANT REPLACEMENT    Diagnosis: Status post partial mastectomy, bilateral [Z90.13]  Diagnosis Additional Information: No value filed.    Anesthesia Type:   General     Note:  Airway :Face Mask  Patient transferred to:PACU  Comments: At end of procedure, spontaneous respirations, adequate tidal volumes, followed commands to voice, LMA removed atraumatically, oropharynx suctioned, airway patent after LMA removal. Oxygen via facemask at 8 liters per minute to PACU. Oxygen tubing connected to wall O2 in PACU, SpO2, NiBP, and EKG monitors and alarms on and functioning, Radha Hugger warmer connected to patient gown, report on patient's clinical status given to PACU RN, RN questions answered.Handoff Report: Identifed the Patient, Identified the Reponsible Provider, Reviewed the pertinent medical history, Discussed the surgical course, Reviewed Intra-OP anesthesia mangement and issues during anesthesia, Set expectations for post-procedure period and Allowed opportunity for questions and acknowledgement of understanding      Vitals: (Last set prior to Anesthesia Care Transfer)    CRNA VITALS  6/15/2020 1223 - 6/15/2020 1257      6/15/2020             Resp Rate (set):  10                Electronically Signed By: PETRA Gillis CRNA  Vira 15, 2020  12:57 PM

## 2020-06-15 NOTE — BRIEF OP NOTE
[facility]    Brief Operative Note    Pre-operative diagnosis: Status post partial mastectomy, bilateral [Z90.13]  Post-operative diagnosis deformity reconstructed breasts    Procedure: Procedure(s):  REVISION BILATERAL SILICONE BREAST RECONSTRUCTION WITH  bilateral silcone implants     IMPLANT REPLACEMENT  Surgeon: Surgeon(s) and Role:     * Clara Riddle MD - Primary  Anesthesia: General   Estimated blood loss: Less than 50 ml  Drains: None  Specimens:   ID Type Source Tests Collected by Time Destination   A : Left Breast Implant-Gross Only Other (specify in comments) Breast, Left SURGICAL PATHOLOGY EXAM Clara Riddle MD 6/15/2020 11:49 AM    B : Right Breast Implant-Gross Only Other (specify in comments) Breast, Right SURGICAL PATHOLOGY EXAM Clara Riddle MD 6/15/2020 12:13 PM      Findings:   None.  Complications: None.  Implants:   Implant Name Type Inv. Item Serial No.  Lot No. LRB No. Used Action   IMP BREAST GEL INSPIRA SOFT TOUCH EX-FULL 495ML SSX-[id number] Breast Implant/Tissue Expander IMP BREAST GEL INSPIRA SOFT TOUCH EX-FULL 495ML [id number] [employer]  Left 1 Implanted   IMP BREAST GEL INSPIRA SOFT TOUCH EX-FULL 495ML SSX-[id number] Breast Implant/Tissue Expander IMP BREAST GEL INSPIRA SOFT TOUCH EX-FULL 495ML [id number]-[id number] [employer]  Right 1 Implanted

## 2020-06-15 NOTE — ANESTHESIA PREPROCEDURE EVALUATION
Anesthesia Pre-Procedure Evaluation    Patient: Gayle Moya   MRN: [id number] : 1980          Preoperative Diagnosis: Status post partial mastectomy, bilateral [Z90.13]    Procedure(s):  REVISION BILATERAL SILICONE BREAST RECONSTRUCTION WITH POSSIBLE ALLODERM AND POSSIBLE IMPLANT REPLACEMENT    Past Medical History:   Diagnosis Date     Abnormal Pap smear     CRYO     BRCA gene mutation positive in female     BRCA 1     H/O viral meningitis      Heart murmur      Irregular heart beat     past hx of irregular heart rate-episodes of ?v-tach,rare now, controlled with relaxtion and cough     Major depressive disorder, single episode, moderate (H)      MVP (mitral valve prolapse)     diagnosised at age 20     Sprained ankle      Past Surgical History:   Procedure Laterality Date      SECTION  3/15/2013    Procedure:  SECTION;   Section for failure to descend;  Surgeon: Avelina Steele MD;  Location: [facility] OR     GRAFT FAT TO BREAST N/A 2018    Procedure: FAT GRAFTING FROM ABDOMEN TO BILATERAL BREAST;  Surgeon: Clara Riddle MD;  Location: [facility]     GYN SURGERY      cryotherapy cervical      HYSTROSALINGOGRAM[       MASTECTOMY SIMPLE BILATERAL, SENTINEL NODE BILATERAL, COMBINED Bilateral 2018    Procedure: COMBINED MASTECTOMY SIMPLE BILATERAL, SENTINEL NODE BILATERAL;  Bilateral prophylactic total mastectomies, free nipple graft, bilateral tissue expanders ;  Surgeon: Dayana Carter MD;  Location: [facility] OR     RECONSTRUCT BREAST BILATERAL, IMPLANT PROSTHESIS BILATERAL, COMBINED Bilateral 2018    Procedure: BILATERAL SECOND STAGE BREAST RECONSTRUCTION WITH SILICONE IMPLANT PLACEMENT;  Surgeon: Clara Riddle MD;  Location: [facility]     RECONSTRUCT BREAST, INSERT TISSUE EXPANDER BILATERAL, COMBINED Bilateral 2018    Procedure: COMBINED RECONSTRUCT BREAST, INSERT TISSUE EXPANDER BILATERAL;;  Surgeon: Clara Riddle MD;   Location: [facility] OR     REVISE RECONSTRUCTED BREAST BILATERAL Bilateral 6/22/2018    Procedure: REVISE RECONSTRUCTED BREAST BILATERAL;  REVISION OF BILATERAL MASTECTOMY INCISIONS ;  Surgeon: Clara Riddle MD;  Location: [facility] OR     TONSILLECTOMY & ADENOIDECTOMY  2004     wisdom teeth removal         Anesthesia Evaluation     .             ROS/MED HX    ENT/Pulmonary:     (+)tobacco use, Past use , . .    Neurologic:       Cardiovascular:     (+) ----. : . . . :. dysrhythmias .       METS/Exercise Tolerance:     Hematologic:         Musculoskeletal:         GI/Hepatic:         Renal/Genitourinary:         Endo:         Psychiatric:     (+) psychiatric history depression      Infectious Disease:         Malignancy:   (+)   BRCA Positive        Other:                          Physical Exam  Normal systems: cardiovascular, pulmonary and dental    Airway   Mallampati: II  TM distance: >3 FB  Neck ROM: full    Dental     Cardiovascular       Pulmonary             Lab Results   Component Value Date    WBC 5.4 06/11/2020    HGB 13.0 06/11/2020    HCT 40.8 06/11/2020     06/11/2020     06/11/2020    POTASSIUM 4.7 06/11/2020    CHLORIDE 108 06/11/2020    CO2 25 06/11/2020    BUN 10 06/11/2020    CR 0.58 06/11/2020    GLC 87 06/11/2020    SILVERIO 9.1 06/11/2020    ALBUMIN 4.1 05/18/2016    PROTTOTAL 7.2 05/18/2016    ALT 21 05/18/2016    AST 11 05/18/2016    ALKPHOS 48 05/18/2016    BILITOTAL 0.5 05/18/2016    TSH 1.46 04/09/2019    HCG Negative 06/15/2020       Preop Vitals  BP Readings from Last 3 Encounters:   06/15/20 129/62   06/11/20 120/70   04/09/19 116/66    Pulse Readings from Last 3 Encounters:   06/15/20 80   06/11/20 70   04/09/19 77      Resp Readings from Last 3 Encounters:   06/15/20 16   06/11/20 16   04/09/19 14    SpO2 Readings from Last 3 Encounters:   06/15/20 100%   06/11/20 98%   04/09/19 99%      Temp Readings from Last 1 Encounters:   06/15/20 36.6  C (97.9  F) (Oral)    Ht Readings from  "Last 1 Encounters:   06/11/20 1.676 m (5' 6\")      Wt Readings from Last 1 Encounters:   06/15/20 78 kg (171 lb 14.4 oz)    Estimated body mass index is 27.75 kg/m  as calculated from the following:    Height as of 6/11/20: 1.676 m (5' 6\").    Weight as of this encounter: 78 kg (171 lb 14.4 oz).       Anesthesia Plan      History & Physical Review  History and physical reviewed and following examination; no interval change.    ASA Status:  2 .    NPO Status:  > 8 hours    Plan for General with Intravenous and Propofol induction. Maintenance will be Inhalation.    PONV prophylaxis:  Ondansetron (or other 5HT-3) and Dexamethasone or Solumedrol  12.5mg Benadryl, decadron and Zofran        Postoperative Care  Postoperative pain management:  IV analgesics.      Consents  Anesthetic plan, risks, benefits and alternatives discussed with:  Patient..                 Roel Cerna MD  "

## 2020-06-16 LAB — COPATH REPORT: NORMAL

## 2020-06-16 NOTE — OP NOTE
Procedure Date: 06/15/2020      PREOPERATIVE DIAGNOSES:   1.  Deformity bilateral reconstructed breasts.   2.  Family history of breast cancer.      POSTOPERATIVE DIAGNOSES:   1.  Deformity bilateral reconstructed breasts.   2.  Family history of breast cancer.      PROCEDURE:  Revision bilateral silicone breast reconstruction with implant replacement.      DESCRIPTION OF PROCEDURE:  The patient was marked for incisions and taken to the operating room.  General anesthesia was administered.  The chest area was prepped and draped in a sterile manner.  On the left side, incision made along the inframammary scar and dissection was carried down to underlying implant capsule and pectoralis muscle.  Dissection was carried out anterior to the capsule and muscle until the superior aspect of the mastectomy pocket was reached.  An incision was made through the capsule and the underlying implant was removed.  This is an intact 520 mL SSF implant.  The pectoralis muscle was tacked to the chest wall using interrupted 3-0 PDS suture.  The pocket was modified, placing 2-0 PDS suture along the anterior axillary line and inframammary fold to allow for better positioning of the implant.  Various sized sizers were placed in the pocket and the patient was brought to the upright position.  A silicone implant was chosen.  The patient was returned to the supine position.  The sizer was removed.  The modifications to the pocket were reinforced with a running 2-0 V-Loc suture.  Hemostasis was achieved.  A silicone implant was placed in the prepectoral position and the mastectomy skin was then redraped and excess skin along the inframammary fold was marked.  The excess skin from the superior mastectomy flap was excised.  The excess skin from the inferior mastectomy flap was de-epithelialized and then secured to the superior mastectomy flap using interrupted 3-0 PDS suture.  The incision was then closed with interrupted 3-0 Monocryl in the  subcutaneous tissue followed by running 4-0 subcuticular Monocryl suture.      A similar procedure was completed on the other side.  An inframammary incision was made following the previous scar and dissection was carried down to underlying implant capsule/pectoralis muscle.  Dissection was carried out anterior to the pectoralis muscle until the superior aspect of the mastectomy defect was reached.  The underlying implant was removed and this was a 520 mL silicone implant, SSF, intact.  The pectoralis muscle was then tacked to the chest wall using interrupted 2-0 PDS suture.  The pocket was irrigated with Betadine followed by double antibiotic solution.  A silicone implant was obtained and placed within the pocket.  The superior mastectomy flap was draped over the implant and the excess skin was marked along the inframammary fold.  The inferior mastectomy skin was de-epithelialized and secured to the superior mastectomy flap using interrupted 3-0 PDS suture.  The skin incision was then closed along the inframammary fold using interrupted 3-0 Monocryl in the subcutaneous tissue followed by running 4-0 subcuticular Monocryl suture.  Xeroform and light dressing were applied.      IMPLANT INFORMATION:  Ulysses Cheek, reference #[id number].  Right serial #[id number].  Left serial #[id number].  Both implants are 495 mL.  The implants that were removed were reference #SSF, 520 mL implants.         MO REN MD             D: 2020   T: 2020   MT: ANGEL      Name:     ANNELISE SOLANO   MRN:      [id number]        Account:        [id number]   :      1980           Procedure Date: 06/15/2020      Document: [id number]

## 2020-09-20 NOTE — TELEPHONE ENCOUNTER
Patient completely out of medication, can a few be sent to pharmacy today?   [Clear Rhinorrhea] : clear rhinorrhea [Erythematous Oropharynx] : erythematous oropharynx [Capillary Refill <2s] : capillary refill < 2s [NL] : warm

## 2020-10-19 DIAGNOSIS — F32.1 MAJOR DEPRESSIVE DISORDER, SINGLE EPISODE, MODERATE (H): ICD-10-CM

## 2020-10-21 RX ORDER — CITALOPRAM HYDROBROMIDE 20 MG/1
TABLET ORAL
Qty: 90 TABLET | Refills: 1 | Status: SHIPPED | OUTPATIENT
Start: 2020-10-21 | End: 2021-04-30

## 2020-10-21 NOTE — TELEPHONE ENCOUNTER
Routing refill request to provider for review/approval because:  PHQ-9 > [facility] protocol for RN refill

## 2021-01-01 NOTE — OP NOTE
PLASTIC SURGERY OPERATIVE NOTE  Patient Name:  Gayle Moya     Date of Service:  5/29/2018     PREOPERATIVE DIAGNOSES:   1.  BRCA 1 Positive      POSTOPERATIVE DIAGNOSES:   1.  BRCA 1 Positive        SURGEON:  Clara Riddle MD   OR STAFF:  Circulator: Carol Osorio RN; Medina Villalta RN  Relief Circulator: Jacinto Lund RN  Relief Scrub: Sharri Evans  Scrub Person: Parvin Boudreaux     ANESTHESIA:  General     ESTIMATED BLOOD LOSS:  * No blood loss amount entered *   SPECIMEN(S):    ID Type Source Tests Collected by Time Destination   A : Right Breast - stitch zev 12 O'clock  Tissue Breast, Right SURGICAL PATHOLOGY EXAM Dayana Carter MD 5/29/2018 11:07 AM    B : Left Breast- Stitch Zev 12:00 Tissue Breast, Left SURGICAL PATHOLOGY EXAM Dayana Carter MD 5/29/2018 12:08 PM         PROCEDURES:   1.  Bilateral tissue expander placement  2.  Local tissue rearrangement of 160 cm2   3.  Bilateral free nipple grafts, 16 cm  per side        DESCRIPTION OF PROCEDURE:  Patient was marked for incisions and taken to the operating room by Dr. Carter.  She completed her portion of the surgery.  The mastectomy flaps were checked and found to be viable.  On the left side, a subpectoral pocket was developed and dissection was carried down to the inframammary fold.  Hemostasis was achieved.  The serratus fascia was elevated laterally.  A tissue expander was made ready for insertion and placed within the pocket after irrigating with Betadine and triple antibiotic.  The tissue expander was secured with suture tabs medially and laterally using 2-0 PDS suture.  The pectoralis muscle was reapproximated interrupted 3-0 Monocryl suture.  Additional fluid was placed within the tissue expander.    The vertical aspect of the mastectomy incision was then reapproximated using interrupted 3-0 Monocryl suture.  Patient was brought the upright position and excess skin along the inframammary fold was  marked.  Patient was returned to supine position.  A portion of this excess skin was de-epithelialized and the size of this in the left side was 15 cm x 4 cm.  This de-epithelialized tissue was then secured to the pectoralis muscle using interrupted 3-0 PDS suture.  Remaining skin was excised.  The incision was reapproximated with interrupted 3-0 Monocryl in the subcutaneous tissue followed by running 4-0 subcuticular Monocryl suture. Prior to closure a 15 fr USHA drain was placed.    The previously harvested nipple was then trimmed appropriately.  The new nipple or complex position was marked.  The skin was de-epithelialized.  The nipple graft was then inset using interrupted 4-0 chromic circumferentially.    On the right side, a subpectoral pocket was developed and dissection was carried down to the inframammary fold.  Hemostasis was achieved.  The serratus fascia was elevated laterally.  A tissue expander was made ready for insertion and placed within the pocket after irrigating with Betadine and triple antibiotic.  The tissue expander was secured with suture tabs medially and laterally using 2-0 PDS suture.  The pectoralis muscle was reapproximated interrupted 3-0 Monocryl suture.  Additional fluid was placed within the tissue expander.    The vertical aspect of the mastectomy incision was then reapproximated using interrupted 3-0 Monocryl suture.  Patient was brought the upright position and excess skin along the inframammary fold was marked.  Patient was returned to supine position.  A portion of this excess skin was de-epithelialized and the size of this in the left side was 15 cm x 4 cm.  This de-epithelialized tissue was then secured to the pectoralis muscle using interrupted 3-0 PDS suture.  Remaining skin was excised.  The incision was reapproximated with interrupted 3-0 Monocryl in the subcutaneous tissue followed by running 4-0 subcuticular Monocryl suture.  Prior to closure a 15 fr USHA drain was  placed.    The previously harvested nipple was then trimmed appropriately.  The new nipple or complex position was marked.  The skin was de-epithelialized.  The nipple graft was then inset using interrupted 4-0 chromic circumferentially.    Xeroform followed by 2 x 2's were placed over each graft and nipple and the dressing was secured with a Tegaderm.  Remaining skin incisions were dressed with Xeroform followed by a Kerlix roll.  The dressing was secured with an Ace wrap.      IMPLANTS:    Implant Name Type Inv. Item Serial No.  Lot No. LRB No. Used   TISSUE EXPANDER IMPLANT ALLERGAN 500ML 133MX-13 Breast Implant/Tissue Expander TISSUE EXPANDER IMPLANT [employer] 500ML 133MX-13 [id number] [employer]  Left 1   TISSUE EXPANDER IMPLANT [employer]GAN 500ML 133MX-13 Breast Implant/Tissue Expander TISSUE EXPANDER IMPLANT [employer]ERGAN 500ML 133MX-[id number] [employer]   Right 1    Both tissue expanders have been filled with 100 cc of saline.    Clara Riddle MD  Plastic Surgery  [facility]  [phone] (office)       [Mother] : mother [Breast milk] : breast milk [Normal] : Normal [In Bassinet/Crib] : sleeps in bassinet/crib [On back] : sleeps on back [No] : No cigarette smoke exposure [Water heater temperature set at <120 degrees F] : Water heater temperature set at <120 degrees F [Rear facing car seat in back seat] : Rear facing car seat in back seat [Carbon Monoxide Detectors] : Carbon monoxide detectors at home [Smoke Detectors] : Smoke detectors at home. [Formula ___ oz/feed] : [unfilled] oz of formula per feed [Loose bedding, pillow, toys, and/or bumpers in crib] : no loose bedding, pillow, toys, and/or bumpers in crib [Exposure to electronic nicotine delivery system] : No exposure to electronic nicotine delivery system [Gun in Home] : No gun in home [At risk for exposure to TB] : Not at risk for exposure to Tuberculosis  [FreeTextEntry7] : 1 month wcc [de-identified] : taking mostly breast milk and some formula supplementation

## 2021-04-28 DIAGNOSIS — F32.1 MAJOR DEPRESSIVE DISORDER, SINGLE EPISODE, MODERATE (H): ICD-10-CM

## 2021-04-30 RX ORDER — CITALOPRAM HYDROBROMIDE 20 MG/1
TABLET ORAL
Qty: 90 TABLET | Refills: 1 | Status: SHIPPED | OUTPATIENT
Start: 2021-04-30 | End: 2022-10-26

## 2021-04-30 NOTE — TELEPHONE ENCOUNTER
Pending Prescriptions:                       Disp   Refills    citalopram (CELEXA) 20 MG tablet [Pharmacy*90 tab*1        Sig: TAKE 1 TABLET(20 MG) BY MOUTH AT BEDTIME    Routing refill request to provider for review/approval because:  PHQ-9 score:    PHQ 3/25/2020   PHQ-9 Total Score 1   Q9: Thoughts of better off dead/self-harm past 2 weeks Not at all

## 2021-10-26 ENCOUNTER — OFFICE VISIT (OUTPATIENT)
Dept: INTERNAL MEDICINE | Facility: CLINIC | Age: 41
End: 2021-10-26
Payer: COMMERCIAL

## 2021-10-26 VITALS
DIASTOLIC BLOOD PRESSURE: 86 MMHG | BODY MASS INDEX: 31.13 KG/M2 | HEART RATE: 67 BPM | OXYGEN SATURATION: 98 % | TEMPERATURE: 98.9 F | RESPIRATION RATE: 16 BRPM | SYSTOLIC BLOOD PRESSURE: 125 MMHG | HEIGHT: 66 IN | WEIGHT: 193.7 LBS

## 2021-10-26 DIAGNOSIS — F32.1 MAJOR DEPRESSIVE DISORDER, SINGLE EPISODE, MODERATE (H): ICD-10-CM

## 2021-10-26 DIAGNOSIS — Z15.09 BRCA GENE POSITIVE: ICD-10-CM

## 2021-10-26 DIAGNOSIS — Z90.13 ACQUIRED ABSENCE OF BOTH BREASTS AND NIPPLES: ICD-10-CM

## 2021-10-26 DIAGNOSIS — Z01.818 PREOP GENERAL PHYSICAL EXAM: Primary | ICD-10-CM

## 2021-10-26 DIAGNOSIS — R10.2 PELVIC PAIN IN FEMALE: ICD-10-CM

## 2021-10-26 DIAGNOSIS — Z15.01 BRCA GENE POSITIVE: ICD-10-CM

## 2021-10-26 DIAGNOSIS — Z00.00 ROUTINE GENERAL MEDICAL EXAMINATION AT A HEALTH CARE FACILITY: ICD-10-CM

## 2021-10-26 LAB
BASOPHILS # BLD AUTO: 0 10E3/UL (ref 0–0.2)
BASOPHILS NFR BLD AUTO: 1 %
EOSINOPHIL # BLD AUTO: 0.2 10E3/UL (ref 0–0.7)
EOSINOPHIL NFR BLD AUTO: 3 %
ERYTHROCYTE [DISTWIDTH] IN BLOOD BY AUTOMATED COUNT: 13.1 % (ref 10–15)
HCT VFR BLD AUTO: 40 % (ref 35–47)
HGB BLD-MCNC: 12.9 G/DL (ref 11.7–15.7)
LYMPHOCYTES # BLD AUTO: 1.7 10E3/UL (ref 0.8–5.3)
LYMPHOCYTES NFR BLD AUTO: 31 %
MCH RBC QN AUTO: 29.1 PG (ref 26.5–33)
MCHC RBC AUTO-ENTMCNC: 32.3 G/DL (ref 31.5–36.5)
MCV RBC AUTO: 90 FL (ref 78–100)
MONOCYTES # BLD AUTO: 0.3 10E3/UL (ref 0–1.3)
MONOCYTES NFR BLD AUTO: 6 %
NEUTROPHILS # BLD AUTO: 3.3 10E3/UL (ref 1.6–8.3)
NEUTROPHILS NFR BLD AUTO: 60 %
PLATELET # BLD AUTO: 204 10E3/UL (ref 150–450)
RBC # BLD AUTO: 4.44 10E6/UL (ref 3.8–5.2)
WBC # BLD AUTO: 5.6 10E3/UL (ref 4–11)

## 2021-10-26 PROCEDURE — 36415 COLL VENOUS BLD VENIPUNCTURE: CPT | Performed by: NURSE PRACTITIONER

## 2021-10-26 PROCEDURE — 99395 PREV VISIT EST AGE 18-39: CPT | Performed by: NURSE PRACTITIONER

## 2021-10-26 PROCEDURE — 80061 LIPID PANEL: CPT | Performed by: NURSE PRACTITIONER

## 2021-10-26 PROCEDURE — 99214 OFFICE O/P EST MOD 30 MIN: CPT | Mod: 25 | Performed by: NURSE PRACTITIONER

## 2021-10-26 PROCEDURE — 86803 HEPATITIS C AB TEST: CPT | Performed by: NURSE PRACTITIONER

## 2021-10-26 PROCEDURE — 80050 GENERAL HEALTH PANEL: CPT | Performed by: NURSE PRACTITIONER

## 2021-10-26 RX ORDER — CITALOPRAM HYDROBROMIDE 40 MG/1
40 TABLET ORAL DAILY
Qty: 90 TABLET | Refills: 3 | Status: SHIPPED | OUTPATIENT
Start: 2021-10-26 | End: 2022-10-26

## 2021-10-26 ASSESSMENT — ANXIETY QUESTIONNAIRES
1. FEELING NERVOUS, ANXIOUS, OR ON EDGE: SEVERAL DAYS
3. WORRYING TOO MUCH ABOUT DIFFERENT THINGS: SEVERAL DAYS
7. FEELING AFRAID AS IF SOMETHING AWFUL MIGHT HAPPEN: SEVERAL DAYS
6. BECOMING EASILY ANNOYED OR IRRITABLE: MORE THAN HALF THE DAYS
GAD7 TOTAL SCORE: 8
2. NOT BEING ABLE TO STOP OR CONTROL WORRYING: SEVERAL DAYS
IF YOU CHECKED OFF ANY PROBLEMS ON THIS QUESTIONNAIRE, HOW DIFFICULT HAVE THESE PROBLEMS MADE IT FOR YOU TO DO YOUR WORK, TAKE CARE OF THINGS AT HOME, OR GET ALONG WITH OTHER PEOPLE: SOMEWHAT DIFFICULT
5. BEING SO RESTLESS THAT IT IS HARD TO SIT STILL: SEVERAL DAYS

## 2021-10-26 ASSESSMENT — PATIENT HEALTH QUESTIONNAIRE - PHQ9
10. IF YOU CHECKED OFF ANY PROBLEMS, HOW DIFFICULT HAVE THESE PROBLEMS MADE IT FOR YOU TO DO YOUR WORK, TAKE CARE OF THINGS AT HOME, OR GET ALONG WITH OTHER PEOPLE: SOMEWHAT DIFFICULT
5. POOR APPETITE OR OVEREATING: SEVERAL DAYS
SUM OF ALL RESPONSES TO PHQ QUESTIONS 1-9: 3
SUM OF ALL RESPONSES TO PHQ QUESTIONS 1-9: 3

## 2021-10-26 ASSESSMENT — MIFFLIN-ST. JEOR: SCORE: 1570.37

## 2021-10-26 NOTE — PROGRESS NOTES
SUBJECTIVE:   CC: Gayle Moya is an 41 year old woman who presents for preventive health visit.     Fasting.    Patient has been advised of split billing requirements and indicates understanding: Yes     Healthy Habits:      Do you get at least three servings of calcium containing foods daily (dairy, green leafy vegetables, etc.)? yes    Amount of exercise or daily activities, outside of work: 5 day(s) per week    Problems taking medications regularly No    Medication side effects: No    Have you had an eye exam in the past two years? no    Do you see a dentist twice per year? no    Do you have sleep apnea, excessive snoring or daytime drowsiness?no      Today's PHQ-2 Score:   PHQ-2 (  [employer]) 2019 3/21/2017   Q1: Little interest or pleasure in doing things 0 0   Q2: Feeling down, depressed or hopeless 0 0   PHQ-2 Score 0 0   Q1: Little interest or pleasure in doing things Not at all -   Q2: Feeling down, depressed or hopeless Not at all -   PHQ-2 Score 0 -   Abuse: Current or Past(Physical, Sexual or Emotional)- No  Do you feel safe in your environment? Yes    Have you ever done Advance Care Planning? (For example, a Health Directive, POLST, or a discussion with a medical provider or your loved ones about your wishes): Yes, patient states has an Advance Care Planning document and will bring a copy to the clinic.    Social History     Tobacco Use     Smoking status: Former Smoker     Quit date: 2012     Years since quittin.4     Smokeless tobacco: Never Used   Substance Use Topics     Alcohol use: Yes     Comment: 3-5 drinks/week     If you drink alcohol do you typically have >3 drinks per day or >7 drinks per week? No                     Reviewed orders with patient.  Reviewed health maintenance and updated orders accordingly - No      FHS-7: No flowsheet data found.      Pertinent mammograms are reviewed under the imaging tab.    Pertinent mammograms are reviewed under the imaging  "tab.  History of abnormal Pap smear:   PAP / HPV Latest Ref Rng & Units 3/21/2017   PAP (Historical) - NIL   HPV16 NEG Negative   HPV18 NEG Negative   HRHPV NEG Negative     Reviewed and updated as needed this visit by clinical staff  Tobacco  Allergies  Meds   Med Hx  Surg Hx  Fam Hx  Soc Hx        Reviewed and updated as needed this visit by Provider                    ROS:  CONSTITUTIONAL: NEGATIVE for fever, chills, change in weight  INTEGUMENTARU/SKIN: NEGATIVE for worrisome rashes, moles or lesions  EYES: NEGATIVE for vision changes or irritation  ENT: NEGATIVE for ear, mouth and throat problems  RESP: NEGATIVE for significant cough or SOB  BREAST: NEGATIVE for masses, tenderness or discharge  CV: NEGATIVE for chest pain, palpitations or peripheral edema  GI: NEGATIVE for nausea, abdominal pain, heartburn, or change in bowel habits  : NEGATIVE for unusual urinary or vaginal symptoms. Periods are regular.  MUSCULOSKELETAL: NEGATIVE for significant arthralgias or myalgia  NEURO: NEGATIVE for weakness, dizziness or paresthesias  PSYCHIATRIC: NEGATIVE for changes in mood or affect    BRCA positive 1   Mother  age 47   Aunt positive     Sister and she tested positive for BRCA 1   radical mastectomy       OBJECTIVE:   /86 (BP Location: Left arm, Patient Position: Chair, Cuff Size: Adult Large)   Pulse 67   Temp 98.9  F (37.2  C) (Oral)   Resp 16   Ht 1.676 m (5' 6\")   Wt 87.9 kg (193 lb 11.2 oz)   SpO2 98%   Breastfeeding No   BMI 31.26 kg/m    EXAM:  GENERAL:  alert and no distress  EYES: Eyes grossly normal to inspection,  and conjunctivae and sclerae normal  HENT: ear canals and TM's normal, nose and mouth without ulcers or lesions  NECK: no adenopathy, no asymmetry, masses, or scars and thyroid normal to palpation  RESP: lungs clear to auscultation - no rales, rhonchi or wheezes  BREAST: normal without masses, tenderness or nipple discharge and no palpable axillary masses or " adenopathy  CV: regular rate and rhythm, normal S1 S2, no S3 or S4, no murmur, click or rub, no peripheral edema and peripheral pulses strong  ABDOMEN: soft, nontender, no hepatosplenomegaly, no masses and bowel sounds normal   (female): normal female external genitalia, normal urethral meatus, vaginal mucosa pink, moist, well rugated, and normal cervix/adnexa/uterus without masses or discharge  MS: no gross musculoskeletal defects noted, no edema  SKIN: no suspicious lesions or rashes  NEURO: Normal strength and tone, mentation intact and speech normal  PSYCH: mentation appears normal, affect normal/bright    Diagnostic Test Results:  Labs reviewed in Epic  Lab in [address]    ASSESSMENT/PLAN:   (Z00.00) Routine general medical examination at a health care facility  (primary encounter diagnosis)  Comment:   Plan: Lipid panel reflex to direct LDL Fasting,         Comprehensive metabolic panel (BMP + Alb, Alk         Phos, ALT, AST, Total. Bili, TP), TSH with free        T4 reflex, CBC with platelets and differential,        Hepatitis C Screen Reflex to HCV RNA Quant and         Genotype            (Z90.13) Acquired absence of both breasts and nipples  Comment: BRCA one positive   Plan: removed prophylactic ally   (F32.1) Major depressive disorder, single episode, moderate (H)  Comment: needs increase   Plan: citalopram (CELEXA) 40 MG tablet            (R10.2) Pelvic pain in female  Comment: recheck   Plan: US Pelvic Complete with Transvaginal            (Z15.01,  Z15.09) BRCA gene positive  Comment:   Plan: US Pelvic Complete with Transvaginal              Patient has been advised of split billing requirements and indicates understanding:   COUNSELING:   Reviewed preventive health counseling, as reflected in patient instructions       Regular exercise       Healthy diet/nutrition       Osteoporosis prevention/bone health    Estimated body mass index is 31.26 kg/m  as calculated from the following:    Height as of  "this encounter: 1.676 m (5' 6\").    Weight as of this encounter: 87.9 kg (193 lb 11.2 oz).        She reports that she quit smoking about 9 years ago. She has never used smokeless tobacco.      Counseling Resources:  ATP IV Guidelines  Pooled Cohorts Equation Calculator  Breast Cancer Risk Calculator  BRCA-Related Cancer Risk Assessment: FHS-7 Tool  FRAX Risk Assessment  ICSI Preventive Guidelines  Dietary Guidelines for Americans, 2010  USDA's MyPlate  ASA Prophylaxis  Lung CA Screening    PETRA Fabian CNP  [facility]  "

## 2021-10-26 NOTE — PROGRESS NOTES
[place] [address]  [facility] [address]  Phone: [phone]  Primary Provider: Yi Gottlieb  Pre-op Performing Provider: ISABEL SHAY      PREOPERATIVE EVALUATION:  Today's date: 10/26/2021    Gayle Moya is a 41 year old female who presents for a preoperative evaluation.    Surgical Information:  Surgery/Procedure: Bilateral mastectomy revision  Surgery Location: [facility]  Surgeon: Dr. DEBBIE Riddle  Surgery Date: 11-8-2021  Time of Surgery: AM  Where patient plans to recover: At home with family  Fax number for surgical facility: Note does not need to be faxed, will be available electronically in Epic.    Type of Anesthesia Anticipated: General    Assessment & Plan     The proposed surgical procedure is considered INTERMEDIATE risk.    Preop general physical exam      Acquired absence of both breasts and nipples  Needs revision     Routine general medical examination at a health care facility    - Lipid panel reflex to direct LDL Fasting; Future  - Comprehensive metabolic panel (BMP + Alb, Alk Phos, ALT, AST, Total. Bili, TP); Future  - TSH with free T4 reflex; Future  - CBC with platelets and differential; Future  - Hepatitis C Screen Reflex to HCV RNA Quant and Genotype; Future    Major depressive disorder, single episode, moderate (H)  Increase in dose of medication   - citalopram (CELEXA) 40 MG tablet; Take 1 tablet (40 mg) by mouth daily    Pelvic pain in female  Needs recheck   - US Pelvic Complete with Transvaginal; Future    BRCA gene positive    - US Pelvic Complete with Transvaginal; Future        Risks and Recommendations:  The patient has the following additional risks and recommendations for perioperative complications:   - No identified additional risk factors other than previously addressed    Medication Instructions:  Patient is on no chronic medications    RECOMMENDATION:  APPROVAL GIVEN to proceed with proposed  procedure, without further diagnostic evaluation.              30 minutes spent on the date of the encounter doing chart review, history and exam, documentation and further activities per the note        Subjective     HPI related to upcoming procedure: needs breast surgery to repair and replace the implants     Preop Questions 10/26/2021   1. Have you ever had a heart attack or stroke? No   2. Have you ever had surgery on your heart or blood vessels, such as a stent placement, a coronary artery bypass, or surgery on an artery in your head, neck, heart, or legs? No   3. Do you have chest pain with activity? No   4. Do you have a history of  heart failure? No   5. Do you currently have a cold, bronchitis or symptoms of other infection? No   6. Do you have a cough, shortness of breath, or wheezing? No   7. Do you or anyone in your family have previous history of blood clots? No   8. Do you or does anyone in your family have a serious bleeding problem such as prolonged bleeding following surgeries or cuts? No   9. Have you ever had problems with anemia or been told to take iron pills? No   10. Have you had any abnormal blood loss such as black, tarry or bloody stools, or abnormal vaginal bleeding? No   11. Have you ever had a blood transfusion? No   12. Are you willing to have a blood transfusion if it is medically needed before, during, or after your surgery? Yes   13. Have you or any of your relatives ever had problems with anesthesia? No   14. Do you have sleep apnea, excessive snoring or daytime drowsiness? No   15. Do you have any artifical heart valves or other implanted medical devices like a pacemaker, defibrillator, or continuous glucose monitor? No   16. Do you have artificial joints? No   17. Are you allergic to latex? No   18. Is there any chance that you may be pregnant? No       Health Care Directive:  Patient does not have a Health Care Directive or Living Will: Discussed advance care planning with  patient; however, patient declined at this time.    Preoperative Review of :   reviewed - no record of controlled substances prescribed.      Status of Chronic Conditions:  See problem list for active medical problems.  Problems all longstanding and stable, except as noted/documented.  See ROS for pertinent symptoms related to these conditions.      Review of Systems  CONSTITUTIONAL: NEGATIVE for fever, chills, change in weight  INTEGUMENTARY/SKIN: NEGATIVE for worrisome rashes, moles or lesions  EYES: NEGATIVE for vision changes or irritation  ENT/MOUTH: left ear itch   RESP: NEGATIVE for significant cough or SOB  BREAST: right breast implant needs revision   CV: NEGATIVE for chest pain, palpitations or peripheral edema  GI: NEGATIVE for nausea, abdominal pain, heartburn, or change in bowel habits  : NEGATIVE for frequency, dysuria, or hematuria  MUSCULOSKELETAL: NEGATIVE for significant arthralgias or myalgia  NEURO: NEGATIVE for weakness, dizziness or paresthesias  ENDOCRINE: NEGATIVE for temperature intolerance, skin/hair changes  HEME: NEGATIVE for bleeding problems  PSYCHIATRIC: NEGATIVE for changes in mood or affect    Patient Active Problem List    Diagnosis Date Noted     Acquired absence of both breasts and nipples 2018     Priority: Medium     BRCA gene positive 2016     Priority: Medium     Major depressive disorder, single episode, moderate (H) 2015     Priority: Medium     Viral meningitis 2015     Priority: Medium     Meningitis 2015     Priority: Medium      delivery delivered 03/15/2013     Priority: Medium     Supervision of normal pregnancy 2013     Priority: Medium      Past Medical History:   Diagnosis Date     Abnormal Pap smear     CRYO     BRCA gene mutation positive in female     BRCA 1     H/O viral meningitis      Heart murmur      Irregular heart beat     past hx of irregular heart rate-episodes of ?v-tach,rare now, controlled with  relaxtion and cough     Major depressive disorder, single episode, moderate (H)      MVP (mitral valve prolapse)     diagnosised at age 20     Sprained ankle      Past Surgical History:   Procedure Laterality Date      SECTION  3/15/2013    Procedure:  SECTION;   Section for failure to descend;  Surgeon: Avelina Steele MD;  Location: [facility] OR     GRAFT FAT TO BREAST N/A 2018    Procedure: FAT GRAFTING FROM ABDOMEN TO BILATERAL BREAST;  Surgeon: Clara Riddle MD;  Location: [facility]     GYN SURGERY      cryotherapy cervical      HYSTROSALINGOGRAM[       MASTECTOMY SIMPLE BILATERAL, SENTINEL NODE BILATERAL, COMBINED Bilateral 2018    Procedure: COMBINED MASTECTOMY SIMPLE BILATERAL, SENTINEL NODE BILATERAL;  Bilateral prophylactic total mastectomies, free nipple graft, bilateral tissue expanders ;  Surgeon: Dayana Carter MD;  Location:  OR     RECONSTRUCT BREAST BILATERAL, IMPLANT PROSTHESIS BILATERAL, COMBINED Bilateral 2018    Procedure: BILATERAL SECOND STAGE BREAST RECONSTRUCTION WITH SILICONE IMPLANT PLACEMENT;  Surgeon: Clara Riddle MD;  Location: [facility]     RECONSTRUCT BREAST, INSERT TISSUE EXPANDER BILATERAL, COMBINED Bilateral 2018    Procedure: COMBINED RECONSTRUCT BREAST, INSERT TISSUE EXPANDER BILATERAL;;  Surgeon: Clara Riddle MD;  Location:  OR     REVISE RECONSTRUCTED BREAST BILATERAL Bilateral 2018    Procedure: REVISE RECONSTRUCTED BREAST BILATERAL;  REVISION OF BILATERAL MASTECTOMY INCISIONS ;  Surgeon: Clara Riddle MD;  Location:  OR     REVISE RECONSTRUCTED BREAST BILATERAL Bilateral 6/15/2020    Procedure: REVISION BILATERAL SILICONE BREAST RECONSTRUCTION WITH  bilateral silcone implants     IMPLANT REPLACEMENT;  Surgeon: Clara Riddle MD;  Location:  OR     TONSILLECTOMY & ADENOIDECTOMY  2004     wisdom teeth removal       Current Outpatient Medications   Medication Sig  "Dispense Refill     Cetirizine HCl (ZYRTEC ALLERGY PO) Take 5 mg by mouth daily        citalopram (CELEXA) 20 MG tablet TAKE 1 TABLET(20 MG) BY MOUTH AT BEDTIME 90 tablet 1     citalopram (CELEXA) 40 MG tablet Take 1 tablet (40 mg) by mouth daily 90 tablet 3     multivitamin w/minerals (MULTI-VITAMIN) tablet Take 1 tablet by mouth daily       NONFORMULARY Takes astragulus once a day.  Unsure of dose.       NONFORMULARY Takes potassium for days she is doing intermittant fasting       NONFORMULARY Arnica, takes daily for next two weeks.       triamcinolone (KENALOG) 0.1 % cream Apply sparingly to affected area three times daily for 14 days. 15 g 0       Allergies   Allergen Reactions     Atenolol Other (See Comments)     \"sleeplessness; hallucinations\"        Social History     Tobacco Use     Smoking status: Former Smoker     Quit date: 2012     Years since quittin.4     Smokeless tobacco: Never Used   Substance Use Topics     Alcohol use: Yes     Comment: 3-5 drinks/week     Family History   Problem Relation Age of Onset     Breast Cancer Mother 37     Cerebrovascular Disease Mother      Substance Abuse Sister      Cerebrovascular Disease Maternal Grandmother      Substance Abuse Paternal Grandmother      Substance Abuse Paternal Grandfather      History   Drug Use No         Objective     /86 (BP Location: Left arm, Patient Position: Chair, Cuff Size: Adult Large)   Pulse 67   Temp 98.9  F (37.2  C) (Oral)   Resp 16   Ht 1.676 m (5' 6\")   Wt 87.9 kg (193 lb 11.2 oz)   SpO2 98%   Breastfeeding No   BMI 31.26 kg/m      Physical Exam    GENERAL APPEARANCE:  alert and no distress     HENT: ear canals and TM's normal and nose and mouth without ulcers or lesions     RESP: lungs clear to auscultation - no rales, rhonchi or wheezes     CV: regular rates and rhythm, normal S1 S2, no S3 or S4 and no murmur, click or rub     ABDOMEN:  soft, nontender, no HSM or masses and bowel sounds normal     MS: " extremities normal- no gross deformities noted, no evidence of inflammation in joints, FROM in all extremities.     SKIN: no suspicious lesions or rashes     NEURO: Normal strength and tone, sensory exam grossly normal, mentation intact and speech normal     PSYCH: mentation appears normal. and affect normal/bright    Recent Labs   Lab Test 06/11/20  1217   HGB 13.0         POTASSIUM 4.7   CR 0.58        Diagnostics:  Labs pending at this time.  Results will be reviewed when available.   No EKG required for low risk surgery (cataract, skin procedure, breast biopsy, etc).    Revised Cardiac Risk Index (RCRI):  The patient has the following serious cardiovascular risks for perioperative complications:       RCRI Interpretation: 0 points: Class I (very low risk - 0.4% complication rate)           Signed Electronically by: PETRA Fabian CNP  Copy of this evaluation report is provided to requesting physician.      Answers for HPI/ROS submitted by the patient on 10/26/2021  If you checked off any problems, how difficult have these problems made it for you to do your work, take care of things at home, or get along with other people?: Somewhat difficult  PHQ9 TOTAL SCORE: 3

## 2021-10-27 LAB
ALBUMIN SERPL-MCNC: 3.8 G/DL (ref 3.4–5)
ALP SERPL-CCNC: 57 U/L (ref 40–150)
ALT SERPL W P-5'-P-CCNC: 25 U/L (ref 0–50)
ANION GAP SERPL CALCULATED.3IONS-SCNC: 2 MMOL/L (ref 3–14)
AST SERPL W P-5'-P-CCNC: 8 U/L (ref 0–45)
BILIRUB SERPL-MCNC: 0.3 MG/DL (ref 0.2–1.3)
BUN SERPL-MCNC: 11 MG/DL (ref 7–30)
CALCIUM SERPL-MCNC: 8.8 MG/DL (ref 8.5–10.1)
CHLORIDE BLD-SCNC: 109 MMOL/L (ref 94–109)
CHOLEST SERPL-MCNC: 179 MG/DL
CO2 SERPL-SCNC: 27 MMOL/L (ref 20–32)
CREAT SERPL-MCNC: 0.64 MG/DL (ref 0.52–1.04)
FASTING STATUS PATIENT QL REPORTED: YES
GFR SERPL CREATININE-BSD FRML MDRD: >90 ML/MIN/1.73M2
GLUCOSE BLD-MCNC: 86 MG/DL (ref 70–99)
HCV AB SERPL QL IA: NONREACTIVE
HDLC SERPL-MCNC: 60 MG/DL
LDLC SERPL CALC-MCNC: 103 MG/DL
NONHDLC SERPL-MCNC: 119 MG/DL
POTASSIUM BLD-SCNC: 4.5 MMOL/L (ref 3.4–5.3)
PROT SERPL-MCNC: 7.2 G/DL (ref 6.8–8.8)
SODIUM SERPL-SCNC: 138 MMOL/L (ref 133–144)
TRIGL SERPL-MCNC: 81 MG/DL
TSH SERPL DL<=0.005 MIU/L-ACNC: 1.29 MU/L (ref 0.4–4)

## 2021-10-27 ASSESSMENT — ANXIETY QUESTIONNAIRES: GAD7 TOTAL SCORE: 8

## 2021-10-28 ENCOUNTER — ANCILLARY PROCEDURE (OUTPATIENT)
Dept: ULTRASOUND IMAGING | Facility: CLINIC | Age: 41
End: 2021-10-28
Attending: NURSE PRACTITIONER
Payer: COMMERCIAL

## 2021-10-28 DIAGNOSIS — R93.89 ABNORMAL PELVIC ULTRASOUND: ICD-10-CM

## 2021-10-28 DIAGNOSIS — Z15.01 BRCA GENE POSITIVE: ICD-10-CM

## 2021-10-28 DIAGNOSIS — Z15.01 BRCA GENE POSITIVE: Primary | ICD-10-CM

## 2021-10-28 DIAGNOSIS — Z15.09 BRCA GENE POSITIVE: Primary | ICD-10-CM

## 2021-10-28 DIAGNOSIS — N83.209 CYST OF OVARY, UNSPECIFIED LATERALITY: ICD-10-CM

## 2021-10-28 DIAGNOSIS — Z15.09 BRCA GENE POSITIVE: ICD-10-CM

## 2021-10-28 DIAGNOSIS — R10.2 PELVIC PAIN IN FEMALE: ICD-10-CM

## 2021-10-28 PROCEDURE — 76856 US EXAM PELVIC COMPLETE: CPT | Performed by: OBSTETRICS & GYNECOLOGY

## 2021-10-28 PROCEDURE — 76830 TRANSVAGINAL US NON-OB: CPT | Performed by: OBSTETRICS & GYNECOLOGY

## 2021-10-29 ENCOUNTER — LAB (OUTPATIENT)
Dept: LAB | Facility: CLINIC | Age: 41
End: 2021-10-29
Payer: COMMERCIAL

## 2021-10-29 DIAGNOSIS — R93.89 ABNORMAL PELVIC ULTRASOUND: ICD-10-CM

## 2021-10-29 DIAGNOSIS — N83.209 CYST OF OVARY, UNSPECIFIED LATERALITY: ICD-10-CM

## 2021-10-29 LAB
CANCER AG125 SERPL-ACNC: 7 U/ML (ref 0–30)
HCG UR QL: NEGATIVE

## 2021-10-29 PROCEDURE — 84702 CHORIONIC GONADOTROPIN TEST: CPT

## 2021-10-29 PROCEDURE — 86304 IMMUNOASSAY TUMOR CA 125: CPT

## 2021-10-29 PROCEDURE — 36415 COLL VENOUS BLD VENIPUNCTURE: CPT

## 2021-10-29 PROCEDURE — 81025 URINE PREGNANCY TEST: CPT

## 2021-10-30 LAB — B-HCG SERPL-ACNC: <1 IU/L (ref 0–5)

## 2021-11-16 ENCOUNTER — OFFICE VISIT (OUTPATIENT)
Dept: OBGYN | Facility: CLINIC | Age: 41
End: 2021-11-16
Payer: COMMERCIAL

## 2021-11-16 VITALS
HEIGHT: 66 IN | WEIGHT: 191 LBS | BODY MASS INDEX: 30.7 KG/M2 | DIASTOLIC BLOOD PRESSURE: 78 MMHG | SYSTOLIC BLOOD PRESSURE: 122 MMHG

## 2021-11-16 DIAGNOSIS — R93.89 THICKENED ENDOMETRIUM: Primary | ICD-10-CM

## 2021-11-16 DIAGNOSIS — N83.291 COMPLEX CYST OF RIGHT OVARY: ICD-10-CM

## 2021-11-16 DIAGNOSIS — Z15.01 BRCA GENE POSITIVE: ICD-10-CM

## 2021-11-16 DIAGNOSIS — Z15.09 BRCA GENE POSITIVE: ICD-10-CM

## 2021-11-16 PROCEDURE — 99202 OFFICE O/P NEW SF 15 MIN: CPT | Performed by: OBSTETRICS & GYNECOLOGY

## 2021-11-16 ASSESSMENT — MIFFLIN-ST. JEOR: SCORE: 1558.12

## 2021-11-16 NOTE — PROGRESS NOTES
"  Assessment & Plan     Thickened endometrium  - Possibly due to timing of U/S's on prior to scans, where both were done in midluteal phase.  - May also be due to endometrial abnormality or polyp  - US Pelvic Complete with Transvaginal; Future to be done right after next menses.  - RTC after U/S to review findings, do her Pap/HPV cotest which are due, and possibly schedule Op Hyst w/ IUM, D&C if stripe still thickened.    Complex cyst of right ovary  - Likely hemorrhagic CL cyst based on last 2 U/S's show similar right cysts and cycle timing placed her at midluteal phase on both scans  - US Pelvic Complete with Transvaginal; Future right after next menses to f/u right ovarian cyst.  F/U clinic as noted above    BRCA gene positive  - At increased risk of ovarian CA.  Pt has seen genetic counselor in past before prior prophylactic mastectomies and reconstruction.  Has been contemplating proceeding with hysterectomy/BSO and is leaning towards this.  - US Pelvic Complete with Transvaginal; Future as noted above             BMI:   Estimated body mass index is 30.83 kg/m  as calculated from the following:    Height as of this encounter: 1.676 m (5' 6\").    Weight as of this encounter: 86.6 kg (191 lb).           No follow-ups on file.    [place] MD  [facility] MED Araujo is a 41 year old who presents for the following health issues     Pt has Hx BRCA 1 on prior testing, after her mother and maternal aunt both had breast CA.  Pt is s/p prophylactic bilateral mastectomies and reconstruction.  Has seen genetic counselor in the past prior to undergoing surgery.  Pt has had occasional right pelvic pain that are described as twinges, not severe, random, and not associated with menses.  She occasionally has these midcycle.  This has been going on for 18 months.  She also has menses Q 28 days lasting 5 days with mostly normal cycles, although had 1 heavy cycle after her COVID vaccine.  " "She had 2 episodes of BTB 6 months ago.  On 4/27/2020 she had a pelvic U/S showing normal-sized uterus with 8 mm stripe and 6 mm cystic area in the cavity, as well as a complex right ovarian cyst 2.3 cm.  She was CD#18 at that time.  Subsequently she had a pelvic U/S 10/28/2021 on CD#22, where the uterus was unchanged but the stripe was 1.7 cm again w/ 7 mm anechoic area in the cavity.  The right ovary had another complex 2.5 cm cyst.  She is interested in possibly prophylactic hysterectomy/BSO due to her risk of ovarian CA being higher.  Of note, she is due for Pap/HPV co-test.             Review of Systems         Objective    /78 (BP Location: Right arm, Patient Position: Sitting, Cuff Size: Adult Large)   Ht 1.676 m (5' 6\")   Wt 86.6 kg (191 lb)   LMP 11/02/2021 (Within Days)   BMI 30.83 kg/m    Body mass index is 30.83 kg/m .  Physical Exam  Constitutional:       General: She is not in acute distress.     Appearance: Normal appearance. She is normal weight. She is not ill-appearing.   Neurological:      Mental Status: She is alert.      Pt had to go to work so exam was deferred.                  "

## 2021-11-16 NOTE — NURSING NOTE
"Chief Complaint   Patient presents with     Follow Up     Ultrasound        Initial /78 (BP Location: Right arm, Patient Position: Sitting, Cuff Size: Adult Large)   Ht 1.676 m (5' 6\")   Wt 86.6 kg (191 lb)   LMP 2021 (Within Days)   BMI 30.83 kg/m   Estimated body mass index is 30.83 kg/m  as calculated from the following:    Height as of this encounter: 1.676 m (5' 6\").    Weight as of this encounter: 86.6 kg (191 lb).  BP completed using cuff size: regular    Questioned patient about current smoking habits.  Pt. has never smoked.          The following HM Due: NONE    Parish Williamson CMA                "

## 2021-12-03 ENCOUNTER — HOSPITAL ENCOUNTER (OUTPATIENT)
Dept: ULTRASOUND IMAGING | Facility: CLINIC | Age: 41
Discharge: HOME | End: 2021-12-03
Attending: OBSTETRICS & GYNECOLOGY | Admitting: OBSTETRICS & GYNECOLOGY
Payer: COMMERCIAL

## 2021-12-03 DIAGNOSIS — Z15.09 BRCA GENE POSITIVE: ICD-10-CM

## 2021-12-03 DIAGNOSIS — N83.291 COMPLEX CYST OF RIGHT OVARY: ICD-10-CM

## 2021-12-03 DIAGNOSIS — Z15.01 BRCA GENE POSITIVE: ICD-10-CM

## 2021-12-03 DIAGNOSIS — R93.89 THICKENED ENDOMETRIUM: ICD-10-CM

## 2021-12-03 PROCEDURE — 76830 TRANSVAGINAL US NON-OB: CPT

## 2022-05-06 ENCOUNTER — OFFICE VISIT (OUTPATIENT)
Dept: OBGYN | Facility: CLINIC | Age: 42
End: 2022-05-06
Payer: COMMERCIAL

## 2022-05-06 VITALS
WEIGHT: 189 LBS | DIASTOLIC BLOOD PRESSURE: 76 MMHG | BODY MASS INDEX: 30.37 KG/M2 | HEIGHT: 66 IN | SYSTOLIC BLOOD PRESSURE: 112 MMHG

## 2022-05-06 DIAGNOSIS — Z15.09 BRCA GENE POSITIVE: ICD-10-CM

## 2022-05-06 DIAGNOSIS — Z12.4 SCREENING FOR MALIGNANT NEOPLASM OF CERVIX: ICD-10-CM

## 2022-05-06 DIAGNOSIS — Z15.01 BRCA GENE POSITIVE: ICD-10-CM

## 2022-05-06 DIAGNOSIS — N92.3 INTERMENSTRUAL BLEEDING: Primary | ICD-10-CM

## 2022-05-06 PROBLEM — N83.291 COMPLEX CYST OF RIGHT OVARY: Status: RESOLVED | Noted: 2021-11-16 | Resolved: 2022-05-06

## 2022-05-06 PROCEDURE — 87624 HPV HI-RISK TYP POOLED RSLT: CPT | Performed by: OBSTETRICS & GYNECOLOGY

## 2022-05-06 PROCEDURE — 88305 TISSUE EXAM BY PATHOLOGIST: CPT | Performed by: PATHOLOGY

## 2022-05-06 PROCEDURE — 58100 BIOPSY OF UTERUS LINING: CPT | Performed by: OBSTETRICS & GYNECOLOGY

## 2022-05-06 PROCEDURE — G0145 SCR C/V CYTO,THINLAYER,RESCR: HCPCS | Performed by: OBSTETRICS & GYNECOLOGY

## 2022-05-06 PROCEDURE — 99213 OFFICE O/P EST LOW 20 MIN: CPT | Mod: 25 | Performed by: OBSTETRICS & GYNECOLOGY

## 2022-05-06 NOTE — NURSING NOTE
"Chief Complaint   Patient presents with     Gyn Exam     Consult     Oopherectomy        Initial /76 (BP Location: Right arm, Patient Position: Sitting, Cuff Size: Adult Regular)   Ht 1.676 m (5' 6\")   Wt 85.7 kg (189 lb)   LMP 2022   BMI 30.51 kg/m   Estimated body mass index is 30.51 kg/m  as calculated from the following:    Height as of this encounter: 1.676 m (5' 6\").    Weight as of this encounter: 85.7 kg (189 lb).  BP completed using cuff size: large    Questioned patient about current smoking habits.  Pt. has never smoked.          The following HM Due: NONE    Parish Williamson CMA                "

## 2022-05-06 NOTE — PROGRESS NOTES
"  Assessment & Plan     Intermenstrual bleeding  - Need to evaluate endometrium for hyperplasia or CA.  - ENDOMETRIAL BIOPSY W/O CERVICAL DILATION was done today as noted below  - Surgical Pathology Exam    Screening for malignant neoplasm of cervix  - Due for screening.  - Pap imaged thin layer screen with HPV - recommended age 30 - 65 years (select HPV order below)      BRCA gene positive  - Is s/p prophylactic Bilat Mastectomies and Reconstruction in 2018  - Had pelvic U/S 12/3/2021 that was normal.  - She is at increased risk of ovarian CA and is leaning towards Hysterectomy/BSO.  Will await EMBx before proceeding with decision on surgery.        Review of the result(s) of each unique test - Pelvic U/S 12/3/2021         BMI:   Estimated body mass index is 30.51 kg/m  as calculated from the following:    Height as of this encounter: 1.676 m (5' 6\").    Weight as of this encounter: 85.7 kg (189 lb).           No follow-ups on file.    Lake eLe MD  [facility] [place]    Cheryl Araujo is a 41 year old who presents for the following health issues     Pt here for some irregular VB and to discuss possible hysterectomy and/or BSO for Hx BRCA mutation positive putting her at risk for Ovarian CA.  She states her menses are usually Q 28 days, lasting 5 days, with avg flow.  Her  4/6/2022 was normal, then she had an episode of IMB 2 weeks ago. She is due for Pap/HPV co-test.             Review of Systems   Genitourinary: Positive for menstrual problem. Negative for pelvic pain, vaginal bleeding and vaginal discharge.            Objective    /76 (BP Location: Right arm, Patient Position: Sitting, Cuff Size: Adult Regular)   Ht 1.676 m (5' 6\")   Wt 85.7 kg (189 lb)   LMP 05/04/2022   BMI 30.51 kg/m    Body mass index is 30.51 kg/m .  Physical Exam   Abdomen- Abdomen soft, non-tender. BS normal. No masses, organomegaly  Pelvic- Exam chaperoned by nurse, External genitalia " normal, Bartholin's glands normal, Napanoch's glands normal, Urethral meatus normal, Urethra normal, Bladder normal, Vagina with normal rugae, no abnormal lesions, no abnormal discharge, Normal cervix without lesions or mucopus, no cervical motion tenderness, EMBx performed as noted below, Uterus normal size, shape, and contour, no masses, non-tender, Adnexa normal size without masses or tenderness bilaterally, Anus normal, Pap smear was Done,  HPV Done    U/S:  Results for orders placed or performed during the hospital encounter of 12/03/21   US Pelvic Complete with Transvaginal    Narrative    US PELVIC TRANSABDOMINAL AND TRANSVAGINAL 12/3/2021 3:37 PM    CLINICAL HISTORY: F/U ovarian cyst; Thickened endometrium; Complex  cyst of right ovary; BRCA gene positive; BRCA gene positive  TECHNIQUE: Transabdominal scans were performed. Endovaginal ultrasound  was performed to better visualize the adnexa.    COMPARISON: 10/28/2021    FINDINGS:    UTERUS: 6.4 x 5.1 x 3.9 cm. Normal in size and position with no  masses.    ENDOMETRIUM: 7 mm. Normal smooth endometrium. Previously endometrial  thickness was 17 mm. Previously seen small cystic structure in the  endometrial canal has resolved.    RIGHT OVARY: 2.8 x 2.3 x 1.4 cm. Normal with flow demonstrated. Small  mildly echogenic complex structure measuring 0.8 x 0.6 x 0.7 cm,  previously measuring 1.9 x 2.5 x 1.9 cm.    LEFT OVARY: 3.1 x 2.3 x 1.7 cm. Normal with flow demonstrated.    Trace free fluid.      Impression    IMPRESSION:  1.  Decrease thickness of the endometrial lining measuring 7 mm.  Previously seen endometrial thickening and small cystic structure in  the endometrium has resolved.  2.  Decreased size of the right ovarian mildly complex structure, now  measuring 8 mm, previously 25 mm. This is likely an involuting corpus  luteum.      KEIKO MCNEAL MD         SYSTEM ID:  [id number]       PROCEDURE:  Endometrial Biopsy      Indications for procedure:  Evaluation of  Intermenstrual bleeding    Pre-Procedure Medications:  None    The proposed procedure to diagnose the above condition was explained to the patient.  Risks, side effects, benefits, and alternatives were discussed. All questions were answered to patient's satisfaction. The patient gave informed consent.       The patient was placed in the dorsal lithotomy position and the perineum was exposed.  External genitalia appeared normal.  The uterus was mobile with normal size, shape, and consistency, without tenderness.  The adnexae palpated normally on bimanual exam without mass or tenderness. The uterus was anteverted  Appropriate sterile technique was used throughout the procedure.  A speculum was inserted and the cervix was visualized.  The cervix was cleansed with antiseptic solution.  A tenaculum was applied to the anterior lip of the cervix.  The uterus was sounded to 8 cm.  The Pipelle Endometrial Suction Curette was used and 1 rotating pass(s) were made between the fundus and the internal os. An adequate sample was obtained and sent to pathology.  Instruments were removed.  The patient experienced mild cramping during the procedure.  This subsided rapidly after instruments were removed.  The patient remained supine for a few moments after the procedure and had no other complications. No heavy bleeding was observed.       The patient was instructed to report any fever, cramping after 48 hours, or bleeding for 24-48 hours heavier than normal menses. OTC NSAIDs may be used for cramping PRN. Sexual relations may be resumed in 2-3 days, or after bleeding has stopped.   Pt. is to contact our office if she has not received the pathology report within 1-2 weeks of the procedure.

## 2022-05-10 LAB
PATH REPORT.COMMENTS IMP SPEC: NORMAL
PATH REPORT.COMMENTS IMP SPEC: NORMAL
PATH REPORT.FINAL DX SPEC: NORMAL
PATH REPORT.GROSS SPEC: NORMAL
PATH REPORT.MICROSCOPIC SPEC OTHER STN: NORMAL
PATH REPORT.RELEVANT HX SPEC: NORMAL
PHOTO IMAGE: NORMAL

## 2022-05-12 LAB
BKR LAB AP GYN ADEQUACY: NORMAL
BKR LAB AP GYN INTERPRETATION: NORMAL
BKR LAB AP HPV REFLEX: NORMAL
BKR LAB AP LMP: NORMAL
BKR LAB AP PREVIOUS ABNORMAL: NORMAL
PATH REPORT.COMMENTS IMP SPEC: NORMAL
PATH REPORT.COMMENTS IMP SPEC: NORMAL
PATH REPORT.RELEVANT HX SPEC: NORMAL

## 2022-05-13 LAB
HUMAN PAPILLOMA VIRUS 16 DNA: NEGATIVE
HUMAN PAPILLOMA VIRUS 18 DNA: NEGATIVE
HUMAN PAPILLOMA VIRUS FINAL DIAGNOSIS: NORMAL
HUMAN PAPILLOMA VIRUS OTHER HR: NEGATIVE

## 2022-05-16 ENCOUNTER — VIRTUAL VISIT (OUTPATIENT)
Dept: OBGYN | Facility: CLINIC | Age: 42
End: 2022-05-16
Payer: COMMERCIAL

## 2022-05-16 DIAGNOSIS — Z15.09 BRCA GENE POSITIVE: Primary | ICD-10-CM

## 2022-05-16 DIAGNOSIS — Z15.01 BRCA GENE POSITIVE: Primary | ICD-10-CM

## 2022-05-16 PROCEDURE — 99213 OFFICE O/P EST LOW 20 MIN: CPT | Mod: TEL | Performed by: OBSTETRICS & GYNECOLOGY

## 2022-05-16 NOTE — PROGRESS NOTES
"Gayle is a 41 year old who is being evaluated via a billable telephone visit.      What phone number would you like to be contacted at? [phone]  How would you like to obtain your AVS? MyChart    Assessment & Plan     BRCA gene positive  - Reviewed recent normal Pap/HPV co-test and EMBx  - Had long discussion re: pros and cons of doing a Lap BSO vs Robotic TLH/BSO. There is no absolute definite recommendation as to whether to remove the uterus other than 1) removing source of BTB or w/d bleeds on HRT (which may need to be at full contraceptive dosing to give adequate  sx relief), 2) removing risk of endometrial CA (unlikely to occur but possible) and uterine sarcoma (rare), 3) removing risk of Cx CA and need for Pap/HPV co-tests going forward, 4) removing need for Progestin Rx in HRT regimen as no endometrium to protect (unless endometriosis is found).  However, just doing a Lap BSO is typically a simpler procedure and has less perioperative risk, as well as may preserve pelvic floor support better with retention of the uterus/Cx.  - Pt will think it over and let me know what she wants to set up.      Review of the result(s) of each unique test - Pap-WNL, HPV Neg, EMBx-benign, Pelvic U/S WNL         BMI:   Estimated body mass index is 30.51 kg/m  as calculated from the following:    Height as of 5/6/22: 1.676 m (5' 6\").    Weight as of 5/6/22: 85.7 kg (189 lb).           No follow-ups on file.    Lake Lee MD  [facility] [place]    Subjective   Gayle is a 41 year old who presents for the following health issues     Pt had phone visit today to discuss surgery options after having had normal Pap, neg HPV, and EMBx benign.  She has Hx BRCA 1 mutation and has had prophylactic bilateral mastectomies.  She is trying to decide between Lap BSO vs Robotic TLH/BSO and wanted to discuss these options today in greater detail.             Review of Systems         Objective     "       Vitals:  No vitals were obtained today due to virtual visit.    Physical Exam   healthy, alert and no distress  PSYCH: Alert and oriented times 3; coherent speech, normal   rate and volume, able to articulate logical thoughts, able   to abstract reason, no tangential thoughts, no hallucinations   or delusions  Her affect is normal  RESP: No cough, no audible wheezing, able to talk in full sentences  Remainder of exam unable to be completed due to telephone visits    Data:  Office Visit on 05/06/2022   Component Date Value Ref Range Status     Case Report 05/06/2022    Final                    Value:Surgical Pathology Report                         Case: [id number]                                  Authorizing Provider:  Lake Lee MD        Collected:           05/06/2022 09:58 AM          Ordering Location:     [facility]  Received:            05/06/2022 11:07 AM                                 [facility]                                                            Pathologist:           Sarah Car MD                                                          Specimen:    Endometrium                                                                                 Final Diagnosis 05/06/2022       Endometrium, biopsy:   - Multiple fragments of benign menstrual phase endometrium, negative for hyperplasia, atypia and malignancy.        Final                    Value:This result contains rich text formatting which cannot be displayed here.     Clinical Information 05/06/2022    Final                    Value:This result contains rich text formatting which cannot be displayed here.     Gross Description 05/06/2022    Final                    Value:This result contains rich text formatting which cannot be displayed here.     Microscopic Description 05/06/2022    Final                    Value:This result contains rich text formatting which cannot be displayed here.     Performing Labs  05/06/2022    Final                    Value:This result contains rich text formatting which cannot be displayed here.     Interpretation 05/06/2022 Negative for Intraepithelial Lesion or Malignancy (NILM)    Final     Comment 05/06/2022    Final                    Value:This result contains rich text formatting which cannot be displayed here.     Specimen Adequacy 05/06/2022 Satisfactory for evaluation, endocervical/transformation zone component present   Final     Clinical Information 05/06/2022    Final                    Value:This result contains rich text formatting which cannot be displayed here.     LMP/Menopause Date 05/06/2022    Final                    Value:This result contains rich text formatting which cannot be displayed here.     Reflex Testing 05/06/2022 Yes regardless of result   Final     Previous Abnormal? 05/06/2022    Final                    Value:This result contains rich text formatting which cannot be displayed here.     Performing Labs 05/06/2022    Final                    Value:This result contains rich text formatting which cannot be displayed here.     Other HR HPV 05/06/2022 Negative  Negative Final     HPV16 DNA 05/06/2022 Negative  Negative Final     HPV18 DNA 05/06/2022 Negative  Negative Final     FINAL DIAGNOSIS 05/06/2022    Final                    Value:This result contains rich text formatting which cannot be displayed here.                 Phone call duration: 25 minutes

## 2022-10-25 DIAGNOSIS — F32.1 MAJOR DEPRESSIVE DISORDER, SINGLE EPISODE, MODERATE (H): ICD-10-CM

## 2022-10-26 RX ORDER — CITALOPRAM HYDROBROMIDE 40 MG/1
TABLET ORAL
Qty: 90 TABLET | Refills: 3 | OUTPATIENT
Start: 2022-10-26

## 2022-10-26 RX ORDER — CITALOPRAM HYDROBROMIDE 40 MG/1
40 TABLET ORAL DAILY
Qty: 90 TABLET | Refills: 3 | Status: SHIPPED | OUTPATIENT
Start: 2022-10-26 | End: 2023-08-04

## 2022-10-26 NOTE — TELEPHONE ENCOUNTER
Patient notified and expressed understanding.  Patient scheduled: first available appointment 12/6/22. Can patient have a refill until appointment? May leave detailed message to inform patient about refill per patient.

## 2022-10-26 NOTE — TELEPHONE ENCOUNTER
Routing refill request to provider for review/approval because:  PHQ-9 score:    PHQ 10/26/2021   PHQ-9 Total Score 6   Q9: Thoughts of better off dead/self-harm past 2 weeks Not at all    Per protocol needs visit every 6 month     Team please call to schedule with provider    Babita Miles RN

## 2023-03-30 NOTE — OR NURSING
Fat Grafting  Right - 90cc  Left - 110cc   Cheek Interpolation Flap Text: A decision was made to reconstruct the defect utilizing an interpolation axial flap and a staged reconstruction.  A telfa template was made of the defect.  This telfa template was then used to outline the Cheek Interpolation flap.  The donor area for the pedicle flap was then injected with anesthesia.  The flap was excised through the skin and subcutaneous tissue down to the layer of the underlying musculature.  The interpolation flap was carefully excised within this deep plane to maintain its blood supply.  The edges of the donor site were undermined.   The donor site was closed in a primary fashion.  The pedicle was then rotated into position and sutured.  Once the tube was sutured into place, adequate blood supply was confirmed with blanching and refill.  The pedicle was then wrapped with xeroform gauze and dressed appropriately with a telfa and gauze bandage to ensure continued blood supply and protect the attached pedicle.

## 2023-08-02 DIAGNOSIS — F32.1 MAJOR DEPRESSIVE DISORDER, SINGLE EPISODE, MODERATE (H): ICD-10-CM

## 2023-08-02 NOTE — TELEPHONE ENCOUNTER
Called and spoke with patient get patient seen as she hasn't been seen by internal medicine since 2021. Assisted patient with appointment.    Appointments in Next Year      Sep 21, 2023  3:30 PM  (Arrive by 3:10 PM)  Adult Preventative Visit with PETRA Fabian CNP  [facility] ([facility] ) [phone]           Routing refill request to provider for review/approval because:  Patient needs to be seen because it has been more than 1 year since last office visit.    Thank you,  Joaquin Carrero, Triage RN [facility]  9:21 AM 8/2/2023

## 2023-08-04 RX ORDER — CITALOPRAM HYDROBROMIDE 40 MG/1
TABLET ORAL
Qty: 90 TABLET | Refills: 0 | Status: SHIPPED | OUTPATIENT
Start: 2023-08-04 | End: 2023-09-21

## 2023-09-21 ENCOUNTER — OFFICE VISIT (OUTPATIENT)
Dept: INTERNAL MEDICINE | Facility: CLINIC | Age: 43
End: 2023-09-21
Payer: COMMERCIAL

## 2023-09-21 VITALS
HEIGHT: 66 IN | OXYGEN SATURATION: 99 % | TEMPERATURE: 98.8 F | WEIGHT: 189 LBS | BODY MASS INDEX: 30.37 KG/M2 | SYSTOLIC BLOOD PRESSURE: 112 MMHG | RESPIRATION RATE: 18 BRPM | DIASTOLIC BLOOD PRESSURE: 80 MMHG | HEART RATE: 76 BPM

## 2023-09-21 DIAGNOSIS — L98.9 SKIN LESION: ICD-10-CM

## 2023-09-21 DIAGNOSIS — G43.809 OTHER MIGRAINE WITHOUT STATUS MIGRAINOSUS, NOT INTRACTABLE: ICD-10-CM

## 2023-09-21 DIAGNOSIS — F32.1 MAJOR DEPRESSIVE DISORDER, SINGLE EPISODE, MODERATE (H): ICD-10-CM

## 2023-09-21 DIAGNOSIS — Z00.00 ROUTINE GENERAL MEDICAL EXAMINATION AT A HEALTH CARE FACILITY: Primary | ICD-10-CM

## 2023-09-21 LAB
BASOPHILS # BLD AUTO: 0.1 10E3/UL (ref 0–0.2)
BASOPHILS NFR BLD AUTO: 1 %
EOSINOPHIL # BLD AUTO: 0.1 10E3/UL (ref 0–0.7)
EOSINOPHIL NFR BLD AUTO: 1 %
ERYTHROCYTE [DISTWIDTH] IN BLOOD BY AUTOMATED COUNT: 12.4 % (ref 10–15)
HCT VFR BLD AUTO: 42 % (ref 35–47)
HGB BLD-MCNC: 13.8 G/DL (ref 11.7–15.7)
IMM GRANULOCYTES # BLD: 0 10E3/UL
IMM GRANULOCYTES NFR BLD: 0 %
LYMPHOCYTES # BLD AUTO: 2 10E3/UL (ref 0.8–5.3)
LYMPHOCYTES NFR BLD AUTO: 24 %
MCH RBC QN AUTO: 29.1 PG (ref 26.5–33)
MCHC RBC AUTO-ENTMCNC: 32.9 G/DL (ref 31.5–36.5)
MCV RBC AUTO: 88 FL (ref 78–100)
MONOCYTES # BLD AUTO: 0.4 10E3/UL (ref 0–1.3)
MONOCYTES NFR BLD AUTO: 5 %
NEUTROPHILS # BLD AUTO: 5.7 10E3/UL (ref 1.6–8.3)
NEUTROPHILS NFR BLD AUTO: 69 %
PLATELET # BLD AUTO: 233 10E3/UL (ref 150–450)
RBC # BLD AUTO: 4.75 10E6/UL (ref 3.8–5.2)
WBC # BLD AUTO: 8.3 10E3/UL (ref 4–11)

## 2023-09-21 PROCEDURE — 36415 COLL VENOUS BLD VENIPUNCTURE: CPT | Performed by: NURSE PRACTITIONER

## 2023-09-21 PROCEDURE — 90686 IIV4 VACC NO PRSV 0.5 ML IM: CPT | Performed by: NURSE PRACTITIONER

## 2023-09-21 PROCEDURE — 99213 OFFICE O/P EST LOW 20 MIN: CPT | Mod: 25 | Performed by: NURSE PRACTITIONER

## 2023-09-21 PROCEDURE — 84443 ASSAY THYROID STIM HORMONE: CPT | Performed by: NURSE PRACTITIONER

## 2023-09-21 PROCEDURE — 80061 LIPID PANEL: CPT | Performed by: NURSE PRACTITIONER

## 2023-09-21 PROCEDURE — 90715 TDAP VACCINE 7 YRS/> IM: CPT | Performed by: NURSE PRACTITIONER

## 2023-09-21 PROCEDURE — 90471 IMMUNIZATION ADMIN: CPT | Performed by: NURSE PRACTITIONER

## 2023-09-21 PROCEDURE — 80053 COMPREHEN METABOLIC PANEL: CPT | Performed by: NURSE PRACTITIONER

## 2023-09-21 PROCEDURE — 99396 PREV VISIT EST AGE 40-64: CPT | Mod: 25 | Performed by: NURSE PRACTITIONER

## 2023-09-21 PROCEDURE — 90651 9VHPV VACCINE 2/3 DOSE IM: CPT | Performed by: NURSE PRACTITIONER

## 2023-09-21 PROCEDURE — 90472 IMMUNIZATION ADMIN EACH ADD: CPT | Performed by: NURSE PRACTITIONER

## 2023-09-21 PROCEDURE — 85025 COMPLETE CBC W/AUTO DIFF WBC: CPT | Performed by: NURSE PRACTITIONER

## 2023-09-21 RX ORDER — RIZATRIPTAN BENZOATE 5 MG/1
5 TABLET, ORALLY DISINTEGRATING ORAL
Qty: 12 TABLET | Refills: 3 | Status: SHIPPED | OUTPATIENT
Start: 2023-09-21

## 2023-09-21 RX ORDER — CITALOPRAM HYDROBROMIDE 40 MG/1
40 TABLET ORAL DAILY
Qty: 90 TABLET | Refills: 3 | Status: SHIPPED | OUTPATIENT
Start: 2023-09-21 | End: 2024-08-02

## 2023-09-21 ASSESSMENT — ENCOUNTER SYMPTOMS
CHILLS: 0
DIZZINESS: 0
HEADACHES: 1
FEVER: 0
NERVOUS/ANXIOUS: 0
PALPITATIONS: 0
COUGH: 0
DIARRHEA: 0
FREQUENCY: 0
NAUSEA: 0
JOINT SWELLING: 0
WEAKNESS: 0
HEMATOCHEZIA: 0
CONSTIPATION: 0
HEMATURIA: 0
EYE PAIN: 0
ABDOMINAL PAIN: 0
BREAST MASS: 0
PARESTHESIAS: 0
DYSURIA: 0
SORE THROAT: 0
SHORTNESS OF BREATH: 0
ARTHRALGIAS: 0
HEARTBURN: 0
MYALGIAS: 0

## 2023-09-21 ASSESSMENT — PATIENT HEALTH QUESTIONNAIRE - PHQ9
10. IF YOU CHECKED OFF ANY PROBLEMS, HOW DIFFICULT HAVE THESE PROBLEMS MADE IT FOR YOU TO DO YOUR WORK, TAKE CARE OF THINGS AT HOME, OR GET ALONG WITH OTHER PEOPLE: NOT DIFFICULT AT ALL
SUM OF ALL RESPONSES TO PHQ QUESTIONS 1-9: 3
SUM OF ALL RESPONSES TO PHQ QUESTIONS 1-9: 3

## 2023-09-21 NOTE — NURSING NOTE
Prior to immunization administration, verified patients identity using patient s name and date of birth. Please see Immunization Activity for additional information.     Screening Questionnaire for Adult Immunization    Are you sick today?   No   Do you have allergies to medications, food, a vaccine component or latex?   No   Have you ever had a serious reaction after receiving a vaccination?   No   Do you have a long-term health problem with heart, lung, kidney, or metabolic disease (e.g., diabetes), asthma, a blood disorder, no spleen, complement component deficiency, a cochlear implant, or a spinal fluid leak?  Are you on long-term aspirin therapy?   No   Do you have cancer, leukemia, HIV/AIDS, or any other immune system problem?   No   Do you have a parent, brother, or sister with an immune system problem?   No   In the past 3 months, have you taken medications that affect  your immune system, such as prednisone, other steroids, or anticancer drugs; drugs for the treatment of rheumatoid arthritis, Crohn s disease, or psoriasis; or have you had radiation treatments?   No   Have you had a seizure, or a brain or other nervous system problem?   No   During the past year, have you received a transfusion of blood or blood    products, or been given immune (gamma) globulin or antiviral drug?   No   For women: Are you pregnant or is there a chance you could become       pregnant during the next month?   No   Have you received any vaccinations in the past 4 weeks?   No     Immunization questionnaire answers were all negative.      Patient instructed to remain in clinic for 15 minutes afterwards, and to report any adverse reactions.     Screening performed by Ernestina Robles LPN on 9/21/2023 at 4:28 PM.

## 2023-09-21 NOTE — PROGRESS NOTES
SUBJECTIVE:   CC: Gayle is an 43 year old who presents for preventive health visit.       2023     3:25 PM   Additional Questions   Roomed by Ernestina REYEZ       Healthy Habits:     Getting at least 3 servings of Calcium per day:  Yes    Bi-annual eye exam:  NO    Dental care twice a year:  NO    Sleep apnea or symptoms of sleep apnea:  Daytime drowsiness    Diet:  Carbohydrate counting    Frequency of exercise:  4-5 days/week    Duration of exercise:  30-45 minutes    Taking medications regularly:  Yes    Additional concerns today:  Yes      Today's PHQ-9 Score:       2023     3:13 PM   PHQ-9 SCORE   PHQ-9 Total Score MyChart 3 (Minimal depression)   PHQ-9 Total Score 3                       Social History     Tobacco Use    Smoking status: Former     Types: Cigarettes     Quit date: 2012     Years since quittin.3    Smokeless tobacco: Never   Substance Use Topics    Alcohol use: Yes     Comment: 3-5 drinks/week             2023     3:16 PM   Alcohol Use   Prescreen: >3 drinks/day or >7 drinks/week? No     Reviewed orders with patient.  Reviewed health maintenance and updated orders accordingly - Yes      Breast Cancer Screening:    FHS-7:       2023     3:17 PM   Breast CA Risk Assessment (FHS-7)   Did any of your first-degree relatives have breast or ovarian cancer? Yes   Did any of your relatives have bilateral breast cancer? Yes   Did any man in your family have breast cancer? No   Did any woman in your family have breast and ovarian cancer? Unknown   Did any woman in your family have breast cancer before age 50 y? Yes   Do you have 2 or more relatives with breast and/or ovarian cancer? Yes   Do you have 2 or more relatives with breast and/or bowel cancer? Yes         Pertinent mammograms are reviewed under the imaging tab.    History of abnormal Pap smear:       Latest Ref Rng & Units 2022     9:58 AM 3/21/2017     4:40 PM 3/21/2017     4:27 PM   PAP / HPV   PAP  Negative  "for Intraepithelial Lesion or Malignancy (NILM)      PAP (Historical)    NIL    HPV 16 DNA Negative Negative  Negative     HPV 18 DNA Negative Negative  Negative     Other HR HPV Negative Negative  Negative       Reviewed and updated as needed this visit by clinical staff   Tobacco  Allergies  Meds  Problems  Med Hx  Surg Hx  Fam Hx          Reviewed and updated as needed this visit by Provider    Allergies                  Review of Systems   Constitutional:  Negative for chills and fever.   HENT:  Positive for hearing loss. Negative for congestion, ear pain and sore throat.    Eyes:  Negative for pain and visual disturbance.   Respiratory:  Negative for cough and shortness of breath.    Cardiovascular:  Negative for chest pain, palpitations and peripheral edema.   Gastrointestinal:  Negative for abdominal pain, constipation, diarrhea, heartburn, hematochezia and nausea.   Breasts:  Negative for tenderness, breast mass and discharge.   Genitourinary:  Negative for dysuria, frequency, genital sores, hematuria, pelvic pain, urgency, vaginal bleeding and vaginal discharge.   Musculoskeletal:  Negative for arthralgias, joint swelling and myalgias.   Skin:  Negative for rash.   Neurological:  Positive for headaches. Negative for dizziness, weakness and paresthesias.   Psychiatric/Behavioral:  Positive for mood changes. The patient is not nervous/anxious.      On celexa - doing well on current dose     Hearing loss - does not want referral at this time     Headaches - every few months - out for 3 days with it   Ibuprofen and dark room  Migraine as child   CT as a child ok   Want to try migraine medication   Aleve helps some     Breast removed and implant silicone in     Sun spot on face - see derm - needs referral     OBJECTIVE:   /80   Pulse 76   Temp 98.8  F (37.1  C) (Oral)   Resp 18   Ht 1.676 m (5' 6\")   Wt 85.7 kg (189 lb)   LMP 09/08/2023 (Exact Date)   SpO2 99%   BMI 30.51 kg/m    Physical " Exam  GENERAL:alert and no distress  EYES: Eyes grossly normal to inspection, PERRL and conjunctivae and sclerae normal  HENT: ear canals and TM's normal, nose and mouth without ulcers or lesions  NECK: no adenopathy, no asymmetry, masses, or scars and thyroid normal to palpation  RESP: lungs clear to auscultation - no rales, rhonchi or wheezes  BREAST: normal without masses, tenderness or nipple discharge and no palpable axillary masses or adenopathy  CV: regular rate and rhythm, normal S1 S2, no S3 or S4, no murmur, click or rub, no peripheral edema and peripheral pulses strong  ABDOMEN: soft, nontender, no hepatosplenomegaly, no masses and bowel sounds normal  MS: no gross musculoskeletal defects noted, no edema  SKIN: no suspicious lesions or rashes  NEURO: Normal strength and tone, mentation intact and speech normal  PSYCH: mentation appears normal, affect normal/bright    Diagnostic Test Results:  Labs reviewed in [facility]  Lab     ASSESSMENT/PLAN:   (Z00.00) Routine general medical examination at a health care facility  (primary encounter diagnosis)  Comment:   Plan: Lipid panel reflex to direct LDL Non-fasting,         Comprehensive metabolic panel (BMP + Alb, Alk         Phos, ALT, AST, Total. Bili, TP), TSH with free        T4 reflex, CBC with platelets and differential            (F32.1) Major depressive disorder, single episode, moderate (H)  Comment: Stable on current medicine  Plan: citalopram (CELEXA) 40 MG tablet            (G43.809) Other migraine without status migrainosus, not intractable  Comment: We will try this for her headaches that she has  Plan: rizatriptan (MAXALT-MLT) 5 MG ODT            (L98.9) Skin lesion  Comment: Wants to have skin check  Plan: Adult Dermatology Referral                  COUNSELING:  Reviewed preventive health counseling, as reflected in patient instructions       Regular exercise       Healthy diet/nutrition       Immunizations  Vaccinated for: Human Papillomavirus,  Influenza, and TDAP           Osteoporosis prevention/bone health        She reports that she quit smoking about 11 years ago. She has never used smokeless tobacco.          PETRA Fabian CNP  [facility] submitted by the patient for this visit:  Patient Health Questionnaire (Submitted on 9/21/2023)  If you checked off any problems, how difficult have these problems made it for you to do your work, take care of things at home, or get along with other people?: Not difficult at all  PHQ9 TOTAL SCORE: 3

## 2023-09-21 NOTE — LETTER
September 25, 2023      Gayle Moya  [id number] [address] [id number]        Dear ,    We are writing to inform you of your test results.    Your test results fall within the expected range(s) or remain unchanged from previous results.  Please continue with current treatment plan.    Resulted Orders   Lipid panel reflex to direct LDL Non-fasting   Result Value Ref Range    Cholesterol 178 <200 mg/dL    Triglycerides 162 (H) <150 mg/dL    Direct Measure HDL 56 >=50 mg/dL    LDL Cholesterol Calculated 90 <=100 mg/dL    Non HDL Cholesterol 122 <130 mg/dL    Narrative    Cholesterol  Desirable:  <200 mg/dL    Triglycerides  Normal:  Less than 150 mg/dL  Borderline High:  150-199 mg/dL  High:  200-499 mg/dL  Very High:  Greater than or equal to 500 mg/dL    Direct Measure HDL  Female:  Greater than or equal to 50 mg/dL   Male:  Greater than or equal to 40 mg/dL    LDL Cholesterol  Desirable:  <100mg/dL  Above Desirable:  100-129 mg/dL   Borderline High:  130-159 mg/dL   High:  160-189 mg/dL   Very High:  >= 190 mg/dL    Non HDL Cholesterol  Desirable:  130 mg/dL  Above Desirable:  130-159 mg/dL  Borderline High:  160-189 mg/dL  High:  190-219 mg/dL  Very High:  Greater than or equal to 220 mg/dL   Comprehensive metabolic panel (BMP + Alb, Alk Phos, ALT, AST, Total. Bili, TP)   Result Value Ref Range    Sodium 142 136 - 145 mmol/L    Potassium 4.8 3.4 - 5.3 mmol/L    Chloride 105 98 - 107 mmol/L    Carbon Dioxide (CO2) 27 22 - 29 mmol/L    Anion Gap 10 7 - 15 mmol/L    Urea Nitrogen 14.6 6.0 - 20.0 mg/dL    Creatinine 0.92 0.51 - 0.95 mg/dL    Calcium 9.7 8.6 - 10.0 mg/dL    Glucose 82 70 - 99 mg/dL    Alkaline Phosphatase 61 35 - 104 U/L    AST 21 0 - 45 U/L      Comment:      Reference intervals for this test were updated on 6/12/2023 to more accurately reflect our healthy population. There may be differences in the flagging of prior results with similar values performed with this method.  Interpretation of those prior results can be made in the context of the updated reference intervals.    ALT 19 0 - 50 U/L      Comment:      Reference intervals for this test were updated on 6/12/2023 to more accurately reflect our healthy population. There may be differences in the flagging of prior results with similar values performed with this method. Interpretation of those prior results can be made in the context of the updated reference intervals.      Protein Total 7.0 6.4 - 8.3 g/dL    Albumin 4.6 3.5 - 5.2 g/dL    Bilirubin Total 0.2 <=1.2 mg/dL    GFR Estimate 80 >60 mL/min/1.73m2   TSH with free T4 reflex   Result Value Ref Range    TSH 1.56 0.30 - 4.20 uIU/mL   CBC with platelets and differential   Result Value Ref Range    WBC Count 8.3 4.0 - 11.0 10e3/uL    RBC Count 4.75 3.80 - 5.20 10e6/uL    Hemoglobin 13.8 11.7 - 15.7 g/dL    Hematocrit 42.0 35.0 - 47.0 %    MCV 88 78 - 100 fL    MCH 29.1 26.5 - 33.0 pg    MCHC 32.9 31.5 - 36.5 g/dL    RDW 12.4 10.0 - 15.0 %    Platelet Count 233 150 - 450 10e3/uL    % Neutrophils 69 %    % Lymphocytes 24 %    % Monocytes 5 %    % Eosinophils 1 %    % Basophils 1 %    % Immature Granulocytes 0 %    Absolute Neutrophils 5.7 1.6 - 8.3 10e3/uL    Absolute Lymphocytes 2.0 0.8 - 5.3 10e3/uL    Absolute Monocytes 0.4 0.0 - 1.3 10e3/uL    Absolute Eosinophils 0.1 0.0 - 0.7 10e3/uL    Absolute Basophils 0.1 0.0 - 0.2 10e3/uL    Absolute Immature Granulocytes 0.0 <=0.4 10e3/uL       If you have any questions or concerns, please call the clinic at the number listed above.       Sincerely,      PETRA Fabian CNP

## 2023-09-22 LAB
ALBUMIN SERPL BCG-MCNC: 4.6 G/DL (ref 3.5–5.2)
ALP SERPL-CCNC: 61 U/L (ref 35–104)
ALT SERPL W P-5'-P-CCNC: 19 U/L (ref 0–50)
ANION GAP SERPL CALCULATED.3IONS-SCNC: 10 MMOL/L (ref 7–15)
AST SERPL W P-5'-P-CCNC: 21 U/L (ref 0–45)
BILIRUB SERPL-MCNC: 0.2 MG/DL
BUN SERPL-MCNC: 14.6 MG/DL (ref 6–20)
CALCIUM SERPL-MCNC: 9.7 MG/DL (ref 8.6–10)
CHLORIDE SERPL-SCNC: 105 MMOL/L (ref 98–107)
CHOLEST SERPL-MCNC: 178 MG/DL
CREAT SERPL-MCNC: 0.92 MG/DL (ref 0.51–0.95)
DEPRECATED HCO3 PLAS-SCNC: 27 MMOL/L (ref 22–29)
EGFRCR SERPLBLD CKD-EPI 2021: 80 ML/MIN/1.73M2
GLUCOSE SERPL-MCNC: 82 MG/DL (ref 70–99)
HDLC SERPL-MCNC: 56 MG/DL
LDLC SERPL CALC-MCNC: 90 MG/DL
NONHDLC SERPL-MCNC: 122 MG/DL
POTASSIUM SERPL-SCNC: 4.8 MMOL/L (ref 3.4–5.3)
PROT SERPL-MCNC: 7 G/DL (ref 6.4–8.3)
SODIUM SERPL-SCNC: 142 MMOL/L (ref 136–145)
TRIGL SERPL-MCNC: 162 MG/DL
TSH SERPL DL<=0.005 MIU/L-ACNC: 1.56 UIU/ML (ref 0.3–4.2)

## 2024-08-02 DIAGNOSIS — F32.1 MAJOR DEPRESSIVE DISORDER, SINGLE EPISODE, MODERATE (H): ICD-10-CM

## 2024-08-02 RX ORDER — CITALOPRAM HYDROBROMIDE 40 MG/1
40 TABLET ORAL DAILY
Qty: 90 TABLET | Refills: 0 | Status: SHIPPED | OUTPATIENT
Start: 2024-08-02

## 2024-10-16 DIAGNOSIS — G43.809 OTHER MIGRAINE WITHOUT STATUS MIGRAINOSUS, NOT INTRACTABLE: ICD-10-CM

## 2024-10-17 RX ORDER — RIZATRIPTAN BENZOATE 5 MG/1
TABLET, ORALLY DISINTEGRATING ORAL
Qty: 12 TABLET | Refills: 0 | Status: SHIPPED | OUTPATIENT
Start: 2024-10-17

## 2024-11-04 ASSESSMENT — PATIENT HEALTH QUESTIONNAIRE - PHQ9: SUM OF ALL RESPONSES TO PHQ QUESTIONS 1-9: 3

## 2024-12-17 ENCOUNTER — OFFICE VISIT (OUTPATIENT)
Dept: INTERNAL MEDICINE | Facility: CLINIC | Age: 44
End: 2024-12-17
Payer: COMMERCIAL

## 2024-12-17 VITALS
SYSTOLIC BLOOD PRESSURE: 122 MMHG | BODY MASS INDEX: 30.7 KG/M2 | HEIGHT: 66 IN | WEIGHT: 191 LBS | HEART RATE: 78 BPM | OXYGEN SATURATION: 98 % | DIASTOLIC BLOOD PRESSURE: 81 MMHG | TEMPERATURE: 97.9 F | RESPIRATION RATE: 16 BRPM

## 2024-12-17 DIAGNOSIS — G43.809 OTHER MIGRAINE WITHOUT STATUS MIGRAINOSUS, NOT INTRACTABLE: ICD-10-CM

## 2024-12-17 DIAGNOSIS — F32.1 MAJOR DEPRESSIVE DISORDER, SINGLE EPISODE, MODERATE (H): ICD-10-CM

## 2024-12-17 DIAGNOSIS — Z00.00 ROUTINE GENERAL MEDICAL EXAMINATION AT A HEALTH CARE FACILITY: Primary | ICD-10-CM

## 2024-12-17 LAB
BASOPHILS # BLD AUTO: 0.1 10E3/UL (ref 0–0.2)
BASOPHILS NFR BLD AUTO: 1 %
EOSINOPHIL # BLD AUTO: 0.1 10E3/UL (ref 0–0.7)
EOSINOPHIL NFR BLD AUTO: 1 %
ERYTHROCYTE [DISTWIDTH] IN BLOOD BY AUTOMATED COUNT: 12.9 % (ref 10–15)
HCT VFR BLD AUTO: 42.5 % (ref 35–47)
HGB BLD-MCNC: 14.2 G/DL (ref 11.7–15.7)
IMM GRANULOCYTES # BLD: 0 10E3/UL
IMM GRANULOCYTES NFR BLD: 0 %
LYMPHOCYTES # BLD AUTO: 2.2 10E3/UL (ref 0.8–5.3)
LYMPHOCYTES NFR BLD AUTO: 26 %
MCH RBC QN AUTO: 29 PG (ref 26.5–33)
MCHC RBC AUTO-ENTMCNC: 33.4 G/DL (ref 31.5–36.5)
MCV RBC AUTO: 87 FL (ref 78–100)
MONOCYTES # BLD AUTO: 0.5 10E3/UL (ref 0–1.3)
MONOCYTES NFR BLD AUTO: 6 %
NEUTROPHILS # BLD AUTO: 5.6 10E3/UL (ref 1.6–8.3)
NEUTROPHILS NFR BLD AUTO: 66 %
PLATELET # BLD AUTO: 275 10E3/UL (ref 150–450)
RBC # BLD AUTO: 4.9 10E6/UL (ref 3.8–5.2)
WBC # BLD AUTO: 8.4 10E3/UL (ref 4–11)

## 2024-12-17 PROCEDURE — 84443 ASSAY THYROID STIM HORMONE: CPT | Performed by: NURSE PRACTITIONER

## 2024-12-17 PROCEDURE — 99214 OFFICE O/P EST MOD 30 MIN: CPT | Mod: 25 | Performed by: NURSE PRACTITIONER

## 2024-12-17 PROCEDURE — 90651 9VHPV VACCINE 2/3 DOSE IM: CPT | Performed by: NURSE PRACTITIONER

## 2024-12-17 PROCEDURE — 99396 PREV VISIT EST AGE 40-64: CPT | Mod: 25 | Performed by: NURSE PRACTITIONER

## 2024-12-17 PROCEDURE — 90471 IMMUNIZATION ADMIN: CPT | Performed by: NURSE PRACTITIONER

## 2024-12-17 PROCEDURE — 36415 COLL VENOUS BLD VENIPUNCTURE: CPT | Performed by: NURSE PRACTITIONER

## 2024-12-17 PROCEDURE — 80061 LIPID PANEL: CPT | Performed by: NURSE PRACTITIONER

## 2024-12-17 PROCEDURE — 80053 COMPREHEN METABOLIC PANEL: CPT | Performed by: NURSE PRACTITIONER

## 2024-12-17 PROCEDURE — 85025 COMPLETE CBC W/AUTO DIFF WBC: CPT | Performed by: NURSE PRACTITIONER

## 2024-12-17 RX ORDER — RIZATRIPTAN BENZOATE 5 MG/1
5 TABLET, ORALLY DISINTEGRATING ORAL
Qty: 12 TABLET | Refills: 4 | Status: SHIPPED | OUTPATIENT
Start: 2024-12-17

## 2024-12-17 RX ORDER — CITALOPRAM HYDROBROMIDE 40 MG/1
40 TABLET ORAL DAILY
Qty: 90 TABLET | Refills: 3 | Status: SHIPPED | OUTPATIENT
Start: 2024-12-17

## 2024-12-17 SDOH — HEALTH STABILITY: PHYSICAL HEALTH: ON AVERAGE, HOW MANY DAYS PER WEEK DO YOU ENGAGE IN MODERATE TO STRENUOUS EXERCISE (LIKE A BRISK WALK)?: 2 DAYS

## 2024-12-17 ASSESSMENT — PATIENT HEALTH QUESTIONNAIRE - PHQ9
10. IF YOU CHECKED OFF ANY PROBLEMS, HOW DIFFICULT HAVE THESE PROBLEMS MADE IT FOR YOU TO DO YOUR WORK, TAKE CARE OF THINGS AT HOME, OR GET ALONG WITH OTHER PEOPLE: SOMEWHAT DIFFICULT
SUM OF ALL RESPONSES TO PHQ QUESTIONS 1-9: 4
SUM OF ALL RESPONSES TO PHQ QUESTIONS 1-9: 4

## 2024-12-17 ASSESSMENT — SOCIAL DETERMINANTS OF HEALTH (SDOH): HOW OFTEN DO YOU GET TOGETHER WITH FRIENDS OR RELATIVES?: TWICE A WEEK

## 2024-12-17 NOTE — PROGRESS NOTES
"Preventive Care Visit  [facility]  PETRA Fabian CNP, Internal Medicine  Dec 17, 2024      Assessment & Plan     Routine general medical examination at a health care facility    - CBC with platelets and differential; Future  - Comprehensive metabolic panel (BMP + Alb, Alk Phos, ALT, AST, Total. Bili, TP); Future  - TSH with free T4 reflex; Future  - Lipid panel reflex to direct LDL Non-fasting; Future  - CBC with platelets and differential  - Comprehensive metabolic panel (BMP + Alb, Alk Phos, ALT, AST, Total. Bili, TP)  - TSH with free T4 reflex  - Lipid panel reflex to direct LDL Non-fasting    Major depressive disorder, single episode, moderate (H)  Doing well on medication - has been on for almost 20 years   Feels it if she misses a dose    - citalopram (CELEXA) 40 MG tablet; Take 1 tablet (40 mg) by mouth daily.    Other migraine without status migrainosus, not intractable  Work when needed   - rizatriptan (MAXALT-MLT) 5 MG ODT; Take 1 tablet (5 mg) by mouth at onset of headache for migraine.            BMI  Estimated body mass index is 30.83 kg/m  as calculated from the following:    Height as of this encounter: 1.676 m (5' 6\").    Weight as of this encounter: 86.6 kg (191 lb).   Discussed diet and exercise     Counseling  Appropriate preventive services were addressed with this patient via screening, questionnaire, or discussion as appropriate for fall prevention, nutrition, physical activity, Tobacco-use cessation, social engagement, weight loss and cognition.  Checklist reviewing preventive services available has been given to the patient.  Reviewed patient's diet, addressing concerns and/or questions.   She is at risk for lack of exercise and has been provided with information to increase physical activity for the benefit of her well-being.   The patient was instructed to see the dentist every 6 months.   She is at risk for psychosocial distress and has been provided " with information to reduce risk.       Patient Instructions   Lab in [address]     Medication refilled       Subjective   Gayle is a 44 year old, presenting for the following:  Physical        12/17/2024     3:45 PM   Additional Questions   Roomed by FADUMO solano LPN   Accompanied by self         12/17/2024     3:45 PM   Patient Reported Additional Medications   Patient reports taking the following new medications none          HPI    Not fasting     Bilateral mastectomy     Mood good on medication       Health Care Directive  Patient does not have a Health Care Directive: Discussed advance care planning with patient; however, patient declined at this time.      12/17/2024   General Health   How would you rate your overall physical health? Good   Feel stress (tense, anxious, or unable to sleep) To some extent      (!) STRESS CONCERN      12/17/2024   Nutrition   Three or more servings of calcium each day? (!) NO   Diet: Carbohydrate counting   How many servings of fruit and vegetables per day? (!) 0-1   How many sweetened beverages each day? 0-1            12/17/2024   Exercise   Days per week of moderate/strenous exercise 2 days      (!) EXERCISE CONCERN      12/17/2024   Social Factors   Frequency of gathering with friends or relatives Twice a week   Worry food won't last until get money to buy more No   Food not last or not have enough money for food? No   Do you have housing? (Housing is defined as stable permanent housing and does not include staying ouside in a car, in a tent, in an abandoned building, in an overnight shelter, or couch-surfing.) Yes   Are you worried about losing your housing? No   Lack of transportation? No   Unable to get utilities (heat,electricity)? No            12/17/2024   Dental   Dentist two times every year? (!) NO            12/17/2024   TB Screening   Were you born outside of the [place]? No          Today's PHQ-9 Score:       12/17/2024     3:36 PM   PHQ-9 SCORE   PHQ-9 Total Score  MyChart 4 (Minimal depression)   PHQ-9 Total Score 4        Patient-reported         2024   Substance Use   Alcohol more than 3/day or more than 7/wk No   Do you use any other substances recreationally? No        Social History     Tobacco Use    Smoking status: Former     Current packs/day: 0.00     Types: Cigarettes     Quit date: 2012     Years since quittin.6    Smokeless tobacco: Never   Vaping Use    Vaping status: Never Used   Substance Use Topics    Alcohol use: Yes     Comment: 3-5 drinks/week    Drug use: No           2023   LAST FHS-7 RESULTS   1st degree relative breast or ovarian cancer Yes    Any relative bilateral breast cancer Yes    Any male have breast cancer No    Any ONE woman have BOTH breast AND ovarian cancer Unknown    Any woman with breast cancer before 50yrs Yes    2 or more relatives with breast AND/OR ovarian cancer Yes    2 or more relatives with breast AND/OR bowel cancer Yes        Patient-reported              2024   STI Screening   New sexual partner(s) since last STI/HIV test? No        History of abnormal Pap smear:         Latest Ref Rng & Units 2022     9:58 AM 3/21/2017     4:40 PM 3/21/2017     4:27 PM   PAP / HPV   PAP  Negative for Intraepithelial Lesion or Malignancy (NILM)      PAP (Historical)    NIL    HPV 16 DNA Negative Negative  Negative     HPV 18 DNA Negative Negative  Negative     Other HR HPV Negative Negative  Negative       ASCVD Risk   The 10-year ASCVD risk score (Mick QUINTREO, et al., 2019) is: 0.5%    Values used to calculate the score:      Age: 44 years      Sex: Female      Is Non- : No      Diabetic: No      Tobacco smoker: No      Systolic Blood Pressure: 122 mmHg      Is BP treated: No      HDL Cholesterol: 56 mg/dL      Total Cholesterol: 178 mg/dL        2024   Contraception/Family Planning   Questions about contraception or family planning No           Reviewed and updated as needed  "this visit by Provider                    Lab work is in process      Review of Systems  Constitutional, neuro, ENT, endocrine, pulmonary, cardiac, gastrointestinal, genitourinary, musculoskeletal, integument and psychiatric systems are negative, except as otherwise noted.     Objective    Exam  /81   Pulse 78   Temp 97.9  F (36.6  C) (Oral)   Resp 16   Ht 1.676 m (5' 6\")   Wt 86.6 kg (191 lb)   LMP 12/01/2024   SpO2 98%   Breastfeeding No   BMI 30.83 kg/m     Estimated body mass index is 30.83 kg/m  as calculated from the following:    Height as of this encounter: 1.676 m (5' 6\").    Weight as of this encounter: 86.6 kg (191 lb).    Physical Exam  GENERAL: alert and no distress  EYES: Eyes grossly normal to inspection, and conjunctivae and sclerae normal  HENT: ear canals and TM's normal, nose and mouth without ulcers or lesions  NECK: no adenopathy, no asymmetry, masses, or scars  RESP: lungs clear to auscultation - no rales, rhonchi or wheezes  CV: regular rate and rhythm, normal S1 S2, no S3 or S4, no murmur, click or rub, no peripheral edema  ABDOMEN: soft, nontender, no hepatosplenomegaly, no masses and bowel sounds normal  MS: no gross musculoskeletal defects noted, no edema  SKIN: no suspicious lesions or rashes  NEURO: Normal strength and tone, mentation intact and speech normal  PSYCH: mentation appears normal, affect normal/bright        Signed Electronically by: PETRA Fabian CNP    Answers submitted by the patient for this visit:  Patient Health Questionnaire (Submitted on 12/17/2024)  If you checked off any problems, how difficult have these problems made it for you to do your work, take care of things at home, or get along with other people?: Somewhat difficult  PHQ9 TOTAL SCORE: 4    "

## 2024-12-17 NOTE — NURSING NOTE
Prior to immunization administration, verified patients identity using patient s name and date of birth. Please see Immunization Activity for additional information.     Screening Questionnaire for Adult Immunization    Are you sick today?   No   Do you have allergies to medications, food, a vaccine component or latex?   No   Have you ever had a serious reaction after receiving a vaccination?   No   Do you have a long-term health problem with heart, lung, kidney, or metabolic disease (e.g., diabetes), asthma, a blood disorder, no spleen, complement component deficiency, a cochlear implant, or a spinal fluid leak?  Are you on long-term aspirin therapy?   No   Do you have cancer, leukemia, HIV/AIDS, or any other immune system problem?   No   Do you have a parent, brother, or sister with an immune system problem?   No   In the past 3 months, have you taken medications that affect  your immune system, such as prednisone, other steroids, or anticancer drugs; drugs for the treatment of rheumatoid arthritis, Crohn s disease, or psoriasis; or have you had radiation treatments?   No   Have you had a seizure, or a brain or other nervous system problem?   No   During the past year, have you received a transfusion of blood or blood    products, or been given immune (gamma) globulin or antiviral drug?   No   For women: Are you pregnant or is there a chance you could become       pregnant during the next month?   No   Have you received any vaccinations in the past 4 weeks?   No     Immunization questionnaire answers were all negative.      Patient instructed to remain in clinic for 15 minutes afterwards, and to report any adverse reactions.     Screening performed by Katherine Goldman LPN on 12/17/2024 at 4:46 PM.

## 2024-12-18 LAB
ALBUMIN SERPL BCG-MCNC: 4.5 G/DL (ref 3.5–5.2)
ALP SERPL-CCNC: 66 U/L (ref 40–150)
ALT SERPL W P-5'-P-CCNC: 13 U/L (ref 0–50)
ANION GAP SERPL CALCULATED.3IONS-SCNC: 12 MMOL/L (ref 7–15)
AST SERPL W P-5'-P-CCNC: 18 U/L (ref 0–45)
BILIRUB SERPL-MCNC: 0.2 MG/DL
BUN SERPL-MCNC: 14.8 MG/DL (ref 6–20)
CALCIUM SERPL-MCNC: 9.5 MG/DL (ref 8.8–10.4)
CHLORIDE SERPL-SCNC: 104 MMOL/L (ref 98–107)
CHOLEST SERPL-MCNC: 198 MG/DL
CREAT SERPL-MCNC: 0.7 MG/DL (ref 0.51–0.95)
EGFRCR SERPLBLD CKD-EPI 2021: >90 ML/MIN/1.73M2
FASTING STATUS PATIENT QL REPORTED: NO
FASTING STATUS PATIENT QL REPORTED: NO
GLUCOSE SERPL-MCNC: 95 MG/DL (ref 70–99)
HCO3 SERPL-SCNC: 23 MMOL/L (ref 22–29)
HDLC SERPL-MCNC: 52 MG/DL
LDLC SERPL CALC-MCNC: 112 MG/DL
NONHDLC SERPL-MCNC: 146 MG/DL
POTASSIUM SERPL-SCNC: 3.9 MMOL/L (ref 3.4–5.3)
PROT SERPL-MCNC: 7.3 G/DL (ref 6.4–8.3)
SODIUM SERPL-SCNC: 139 MMOL/L (ref 135–145)
TRIGL SERPL-MCNC: 172 MG/DL
TSH SERPL DL<=0.005 MIU/L-ACNC: 2.71 UIU/ML (ref 0.3–4.2)

## 2025-04-15 ENCOUNTER — MYC MEDICAL ADVICE (OUTPATIENT)
Dept: INTERNAL MEDICINE | Facility: CLINIC | Age: 45
End: 2025-04-15
Payer: COMMERCIAL

## 2025-06-23 ENCOUNTER — RESULTS FOLLOW-UP (OUTPATIENT)
Dept: FAMILY MEDICINE | Facility: CLINIC | Age: 45
End: 2025-06-23
Payer: COMMERCIAL

## 2025-06-23 DIAGNOSIS — E66.09 CLASS 1 OBESITY DUE TO EXCESS CALORIES WITHOUT SERIOUS COMORBIDITY WITH BODY MASS INDEX (BMI) OF 32.0 TO 32.9 IN ADULT: Primary | ICD-10-CM

## 2025-06-23 DIAGNOSIS — E66.811 CLASS 1 OBESITY DUE TO EXCESS CALORIES WITHOUT SERIOUS COMORBIDITY WITH BODY MASS INDEX (BMI) OF 32.0 TO 32.9 IN ADULT: Primary | ICD-10-CM

## 2025-06-26 ENCOUNTER — TELEPHONE (OUTPATIENT)
Dept: INTERNAL MEDICINE | Facility: CLINIC | Age: 45
End: 2025-06-26
Payer: COMMERCIAL

## 2025-06-26 DIAGNOSIS — E66.09 CLASS 1 OBESITY DUE TO EXCESS CALORIES WITHOUT SERIOUS COMORBIDITY WITH BODY MASS INDEX (BMI) OF 32.0 TO 32.9 IN ADULT: ICD-10-CM

## 2025-06-26 DIAGNOSIS — E66.811 CLASS 1 OBESITY DUE TO EXCESS CALORIES WITHOUT SERIOUS COMORBIDITY WITH BODY MASS INDEX (BMI) OF 32.0 TO 32.9 IN ADULT: ICD-10-CM

## 2025-06-26 NOTE — TELEPHONE ENCOUNTER
Faxed over the prescription for the vials to [facility] pharmacy at [phone]

## 2025-06-26 NOTE — TELEPHONE ENCOUNTER
General Call    Contacts       Contact Date/Time Type Contact Phone/Fax    06/26/2025 01:50 PM CDT Phone (Incoming) [facility] - [place], [place] - [address] [place] [address]  (Pharmacy) [phone]          Reason for Call:  Pharmacy called in and is requesting that Zepbound to be resubmitted as the vial form/ injection - in order to get it approved through insurance.     Fax over e-script to - [phone]

## 2025-06-26 NOTE — TELEPHONE ENCOUNTER
Pt needs Rx re-sent to pharmacy for vials and not pens.    Rx pended.    Madhuri KHOURYN, RN, PHN

## 2025-06-26 NOTE — TELEPHONE ENCOUNTER
I ordered vial first and now the pen is set up to sign. I can sign this but not sure that is what nursing asking for

## 2025-07-14 DIAGNOSIS — E66.09 CLASS 1 OBESITY DUE TO EXCESS CALORIES WITHOUT SERIOUS COMORBIDITY WITH BODY MASS INDEX (BMI) OF 32.0 TO 32.9 IN ADULT: ICD-10-CM

## 2025-07-14 DIAGNOSIS — E66.811 CLASS 1 OBESITY DUE TO EXCESS CALORIES WITHOUT SERIOUS COMORBIDITY WITH BODY MASS INDEX (BMI) OF 32.0 TO 32.9 IN ADULT: ICD-10-CM

## 2025-07-15 RX ORDER — TIRZEPATIDE 2.5 MG/.5ML
INJECTION, SOLUTION SUBCUTANEOUS
Qty: 2 ML | Refills: 0 | Status: SHIPPED | OUTPATIENT
Start: 2025-07-15

## 2025-07-15 NOTE — TELEPHONE ENCOUNTER
Call to patient who states she would like to increase the dose, but prescription should have been sent to [facility].     Pended prescription.    Thank you,  Joaquin, Triage RN Yonatan Robledo    1:02 PM 7/15/2025

## 2025-07-22 ENCOUNTER — MYC MEDICAL ADVICE (OUTPATIENT)
Dept: INTERNAL MEDICINE | Facility: CLINIC | Age: 45
End: 2025-07-22
Payer: COMMERCIAL

## 2025-07-22 ENCOUNTER — MYC MEDICAL ADVICE (OUTPATIENT)
Dept: FAMILY MEDICINE | Facility: CLINIC | Age: 45
End: 2025-07-22
Payer: COMMERCIAL

## 2025-07-29 ENCOUNTER — OFFICE VISIT (OUTPATIENT)
Dept: OBGYN | Facility: CLINIC | Age: 45
End: 2025-07-29
Payer: COMMERCIAL

## 2025-07-29 VITALS — DIASTOLIC BLOOD PRESSURE: 86 MMHG | SYSTOLIC BLOOD PRESSURE: 128 MMHG | WEIGHT: 186.4 LBS | BODY MASS INDEX: 30.09 KG/M2

## 2025-07-29 DIAGNOSIS — Z15.09 BRCA GENE POSITIVE: ICD-10-CM

## 2025-07-29 DIAGNOSIS — Z12.4 SCREENING FOR CERVICAL CANCER: ICD-10-CM

## 2025-07-29 DIAGNOSIS — Z15.01 BRCA GENE POSITIVE: ICD-10-CM

## 2025-07-29 DIAGNOSIS — Z11.3 SCREEN FOR STD (SEXUALLY TRANSMITTED DISEASE): ICD-10-CM

## 2025-07-29 DIAGNOSIS — N92.1 MENOMETRORRHAGIA: Primary | ICD-10-CM

## 2025-07-29 PROBLEM — R93.89 THICKENED ENDOMETRIUM: Status: RESOLVED | Noted: 2021-11-16 | Resolved: 2025-07-29

## 2025-07-29 PROCEDURE — 3079F DIAST BP 80-89 MM HG: CPT | Performed by: OBSTETRICS & GYNECOLOGY

## 2025-07-29 PROCEDURE — 87491 CHLMYD TRACH DNA AMP PROBE: CPT | Performed by: OBSTETRICS & GYNECOLOGY

## 2025-07-29 PROCEDURE — 99204 OFFICE O/P NEW MOD 45 MIN: CPT | Mod: 25 | Performed by: OBSTETRICS & GYNECOLOGY

## 2025-07-29 PROCEDURE — 87591 N.GONORRHOEAE DNA AMP PROB: CPT | Performed by: OBSTETRICS & GYNECOLOGY

## 2025-07-29 PROCEDURE — 3074F SYST BP LT 130 MM HG: CPT | Performed by: OBSTETRICS & GYNECOLOGY

## 2025-07-29 PROCEDURE — 99459 PELVIC EXAMINATION: CPT | Performed by: OBSTETRICS & GYNECOLOGY

## 2025-07-29 PROCEDURE — 87624 HPV HI-RISK TYP POOLED RSLT: CPT | Performed by: OBSTETRICS & GYNECOLOGY

## 2025-07-29 PROCEDURE — 58100 BIOPSY OF UTERUS LINING: CPT | Performed by: OBSTETRICS & GYNECOLOGY

## 2025-07-29 NOTE — NURSING NOTE
"Chief Complaint   Patient presents with    Consult     Hysterectomy/ or just ovaries consult     Pap 22  NIL  HPV=Neg         Initial /86 (BP Location: Right arm, Cuff Size: Adult Regular)   Wt 84.6 kg (186 lb 6.4 oz)   LMP 2025 (Exact Date)   BMI 30.09 kg/m   Estimated body mass index is 30.09 kg/m  as calculated from the following:    Height as of 25: 1.676 m (5' 6\").    Weight as of this encounter: 84.6 kg (186 lb 6.4 oz).  BP completed using cuff size: regular    Questioned patient about current smoking habits.  Pt. quit smoking some time ago.          The following HM Due: pap smear          Debra Diallo CMA on 2025 at 3:07 PM       "

## 2025-07-29 NOTE — PROGRESS NOTES
"  Assessment & Plan     Menometrorrhagia  - Need to confirm no STDs, uterine or endometrial pathology including endometrial hyperplasia or CA, as well as to confirm no fibroids  - Surgical Pathology Exam  - US Pelvic Complete with Transvaginal; Future  - ENDOMETRIAL BIOPSY W/O CERVICAL DILATION    Screening for cervical cancer  - Due for screening  - HPV and Gynecologic Cytology Panel - Recommended Age 30 - 65 Years    Screen for STD (sexually transmitted disease)  - As noted above need to r/o STDs as cause for Menometrorrhagia  - NEISSERIA GONORRHOEA PCR  - CHLAMYDIA TRACHOMATIS PCR    BRCA gene positive  - As discussed in prior visit in 5/2022, assuming above shows no abnormalities that would preclude surgery, she would like to schedule Robotic TLH/BSO. The BSO would be as prophylaxis against risk of ovarian CA that is increased due to this mutation. Pt had been initially unsure about hysterectomy as well, but given the recent menometrorrhagia, and the potential for endometrial abnormalities and recurrent PMB on HRT after her ovaries are removed, as well as the need for Pap/HPV co-testing if she retains her uterus/Cx, she has decided to go forward with the Robotic TLH/BSO.  - Will schedule surgery once above results are back and confirm no further eval needed.  - Will need preop w/ PCP, and bowel prep info as well as visit w/ me 1 week prior to surgery.      Review of the result(s) of each unique test - 5/6/2022 Pap WNL, HPV Neg; EMBx Benign menstrual endometrium; 12/3/2021 Pelvic U/S as noted below       BMI  Estimated body mass index is 30.09 kg/m  as calculated from the following:    Height as of 6/20/25: 1.676 m (5' 6\").    Weight as of this encounter: 84.6 kg (186 lb 6.4 oz).           Cheryl Araujo is a 45 year old, presenting for the following health issues:  Consult (Hysterectomy/ Oopherectomy      Pap 5/6/22  NIL  HPV=Neg  )    HPI                    Objective    /86 (BP Location: Right arm, " Cuff Size: Adult Regular)   Wt 84.6 kg (186 lb 6.4 oz)   LMP 07/24/2025 (Exact Date)   BMI 30.09 kg/m    Body mass index is 30.09 kg/m .  Physical Exam  Constitutional:       General: She is not in acute distress.     Appearance: Normal appearance. She is normal weight. She is not ill-appearing.   Neurological:      Mental Status: She is alert.     Abdomen- Abdomen soft, non-tender. BS normal. No masses, organomegaly  Pelvic- Exam chaperoned by nurse, External genitalia normal, Bartholin's glands normal, [place]'s glands normal, Urethral meatus normal, Urethra normal, Bladder normal, Vagina with normal rugae, no abnormal lesions, no abnormal discharge, Normal cervix without lesions or mucopus, no cervical motion tenderness, Uterus normal size, shape, and contour, no masses, non-tender, Adnexa normal size without masses or tenderness bilaterally, Anus normal, Pap smear was Done,  HPV Done, GC/Chlam probe was Done, EMBx performed as noted below.        PROCEDURE:  Endometrial Biopsy      Indications for procedure:  Evaluation of Menometrorrhagia    Pre-Procedure Medications:  None    The proposed procedure to diagnose the above condition was explained to the patient.  Risks, side effects, benefits, and alternatives were discussed. All questions were answered to patient's satisfaction. The patient gave informed consent.       The patient was placed in the dorsal lithotomy position and the perineum was exposed.  External genitalia appeared normal.  The uterus was mobile with normal size, shape, and consistency, without tenderness.  The adnexae palpated normally on bimanual exam without mass or tenderness. The uterus was anteverted  Appropriate sterile technique was used throughout the procedure.  A speculum was inserted and the cervix was visualized.  The cervix was cleansed with antiseptic solution.  A tenaculum was applied to the anterior lip of the cervix.  The uterus was sounded to 6 cm.  The [place] Endometrial  Suction Curette was used and 1 rotating pass(s) were made between the fundus and the internal os. A scant sample was obtained and sent to pathology.  Instruments were removed.  The patient experienced mild cramping during the procedure.  This subsided rapidly after instruments were removed.  The patient remained supine for a few moments after the procedure and had no other complications. No heavy bleeding was observed.       The patient was instructed to report any fever, cramping after 48 hours, or bleeding for 24-48 hours heavier than normal menses. OTC NSAIDs may be used for cramping PRN. Sexual relations may be resumed in 2-3 days, or after bleeding has stopped.   Pt. is to contact our office if she has not received the pathology report within 1-2 weeks of the procedure.        Pelvic U/S 12/3/2021:  US PELVIC TRANSABDOMINAL AND TRANSVAGINAL 12/3/2021 3:37 PM     CLINICAL HISTORY: F/U ovarian cyst; Thickened endometrium; Complex  cyst of right ovary; BRCA gene positive; BRCA gene positive  TECHNIQUE: Transabdominal scans were performed. Endovaginal ultrasound  was performed to better visualize the adnexa.     COMPARISON: 10/28/2021     FINDINGS:     UTERUS: 6.4 x 5.1 x 3.9 cm. Normal in size and position with no  masses.     ENDOMETRIUM: 7 mm. Normal smooth endometrium. Previously endometrial  thickness was 17 mm. Previously seen small cystic structure in the  endometrial canal has resolved.     RIGHT OVARY: 2.8 x 2.3 x 1.4 cm. Normal with flow demonstrated. Small  mildly echogenic complex structure measuring 0.8 x 0.6 x 0.7 cm,  previously measuring 1.9 x 2.5 x 1.9 cm.     LEFT OVARY: 3.1 x 2.3 x 1.7 cm. Normal with flow demonstrated.     Trace free fluid.                                                                      IMPRESSION:  1.  Decrease thickness of the endometrial lining measuring 7 mm.  Previously seen endometrial thickening and small cystic structure in  the endometrium has resolved.  2.   Decreased size of the right ovarian mildly complex structure, now  measuring 8 mm, previously 25 mm. This is likely an involuting corpus  luteum.        Signed Electronically by: Lake Lee MD

## 2025-07-30 LAB
C TRACH DNA SPEC QL NAA+PROBE: NEGATIVE
HPV HR 12 DNA CVX QL NAA+PROBE: NEGATIVE
HPV16 DNA CVX QL NAA+PROBE: NEGATIVE
HPV18 DNA CVX QL NAA+PROBE: NEGATIVE
HUMAN PAPILLOMA VIRUS FINAL DIAGNOSIS: NORMAL
N GONORRHOEA DNA SPEC QL NAA+PROBE: NEGATIVE
SPECIMEN TYPE: NORMAL
SPECIMEN TYPE: NORMAL

## 2025-08-02 LAB
PATH REPORT.COMMENTS IMP SPEC: NORMAL
PATH REPORT.COMMENTS IMP SPEC: NORMAL
PATH REPORT.FINAL DX SPEC: NORMAL
PATH REPORT.GROSS SPEC: NORMAL
PATH REPORT.MICROSCOPIC SPEC OTHER STN: NORMAL
PATH REPORT.RELEVANT HX SPEC: NORMAL
PHOTO IMAGE: NORMAL

## (undated) DEVICE — SOL ADH LIQUID BENZOIN SWAB 0.6ML C1544

## (undated) DEVICE — SU ETHILON 5-0 P-3 18" BLACK 698G

## (undated) DEVICE — BNDG ELASTIC 4" DBL LENGTH UNSTERILE 6611-14

## (undated) DEVICE — BLADE KNIFE SURG 10 371110

## (undated) DEVICE — GLOVE PROTEXIS BLUE W/NEU-THERA 6.5  2D73EB65

## (undated) DEVICE — DRSG XEROFORM 5X9" 8884431605

## (undated) DEVICE — GLOVE PROTEXIS W/NEU-THERA 7.0  2D73TE70

## (undated) DEVICE — PREP SKIN SCRUB TRAY 4461A

## (undated) DEVICE — PREP POVIDONE IODINE SOLUTION 10% 120ML

## (undated) DEVICE — ESU ELEC BLADE 2.75" COATED/INSULATED E1455

## (undated) DEVICE — LINEN FULL SHEET 5511

## (undated) DEVICE — PAD CHUX UNDERPAD 23X24" 7136

## (undated) DEVICE — GOWN XLG DISP 9545

## (undated) DEVICE — SU PDS II 2-0 CT-2 27"  Z333H

## (undated) DEVICE — GLOVE PROTEXIS W/NEU-THERA 6.5  2D73TE65

## (undated) DEVICE — SU MONOCRYL 3-0 PS-2 27" Y427H

## (undated) DEVICE — DRSG KERLIX 4 1/2"X4YDS ROLL 6715

## (undated) DEVICE — SUCTION CANISTER MEDIVAC LINER 3000ML W/LID 65651-530

## (undated) DEVICE — PACK MAJOR SBA15MAFSI

## (undated) DEVICE — TUBING INFUSION INFILTRATION LIPOSUCTION 156" 24-6008

## (undated) DEVICE — SYR 05ML SLIP TIP W/O NDL 309647

## (undated) DEVICE — BLADE KNIFE SURG 11 371111

## (undated) DEVICE — SYR 10ML LL W/O NDL 302995

## (undated) DEVICE — PEN MARKING SKIN

## (undated) DEVICE — DECANTER BAG 2002S

## (undated) DEVICE — SU SILK 2-0 PSL 18" 673H

## (undated) DEVICE — SU PDS II 3-0 SH 27" Z316H

## (undated) DEVICE — SU MONOCRYL 4-0 PS-2 18" UND Y496G

## (undated) DEVICE — ESU GROUND PAD ADULT W/CORD E7507

## (undated) DEVICE — NDL BLUNT 18GA 1" W/O FILTER 305181

## (undated) DEVICE — SU CHROMIC 4-0 RB-1 27" U203H

## (undated) DEVICE — SYR BULB IRRIG DOVER 60 ML LATEX FREE 67000

## (undated) DEVICE — DRSG GAUZE 2X2" 8042

## (undated) DEVICE — SU PDS II 2-0 CT-1 27" Z339H

## (undated) DEVICE — SPONGE LAP 18X18" X8435

## (undated) DEVICE — TUBING SMOKE EVAC 3/8"X10' E3645

## (undated) DEVICE — LINEN DRAPE 54X72" 5467

## (undated) DEVICE — COLLECTION DEVICE SYSTEM TISSUE REVOLVE RV0001 2 PACK RV0002

## (undated) DEVICE — DRSG GAUZE 4X4" TRAY

## (undated) DEVICE — DRAPE BREAST/CHEST 29420

## (undated) DEVICE — SUCTION CANISTER 1200ML

## (undated) DEVICE — GLOVE PROTEXIS POWDER FREE SMT 6.5  2D72PT65X

## (undated) DEVICE — SU VICRYL 3-0 TIE 12X18" J904T

## (undated) DEVICE — PITCHER STERILE 1000ML  SSK9004A

## (undated) DEVICE — SOL WATER IRRIG 1000ML BOTTLE 2F7114

## (undated) DEVICE — DRSG STERI STRIP 1/2X4" R1547

## (undated) DEVICE — LINEN TOWEL PACK X5 5464

## (undated) DEVICE — SU VICRYL 3-0 SH 27" UND J416H

## (undated) DEVICE — SOL NACL 0.9% INJ 1000ML BAG 2B1324X

## (undated) DEVICE — SUCTION CANISTER MEDIVAC LINER 1500ML W/LID 65651-515

## (undated) DEVICE — SOL NACL 0.9% 20ML VIAL

## (undated) DEVICE — PAD CHUX UNDERPAD 30X36" P3036C

## (undated) DEVICE — DRAIN JACKSON PRATT 15FR ROUND SU130-1323

## (undated) DEVICE — STPL SKIN 35W 6.9MM  PXW35

## (undated) DEVICE — SYR 50ML LL W/O NDL 309653

## (undated) DEVICE — SU VICRYL 2-0 TIE 12X18" J905T

## (undated) DEVICE — PREFILTER SMOKE EVAC E6330

## (undated) DEVICE — LINEN HALF SHEET 5512

## (undated) DEVICE — LINEN TOWEL PACK X10 5473

## (undated) DEVICE — SYR 10ML SLIP TIP W/O NDL 303134

## (undated) DEVICE — DRSG TEGADERM 4X4 3/4" 1626W

## (undated) DEVICE — SUCTION MANIFOLD NEPTUNE 2 SYS 4 PORT 0702-020-000

## (undated) DEVICE — PACK MINOR SBA15MIFSE

## (undated) DEVICE — GLOVE PROTEXIS BLUE W/NEU-THERA 7.0  2D73EB70

## (undated) DEVICE — SU VICRYL 3-0 PS-2 27" UND J427H

## (undated) DEVICE — TUBING SUCTION LIPECTOMY

## (undated) DEVICE — DRAIN JACKSON PRATT RESERVOIR 100ML SU130-1305

## (undated) DEVICE — SU PLAIN FAST ABSORB 5-0 PC-1 18" 1915G

## (undated) DEVICE — SU ETHILON 3-0 PS-2 18" 1669H

## (undated) DEVICE — SLEEVE PROTECTIVE BREAST BIOPSY  GMSLV001-10

## (undated) DEVICE — PREP SCRUB SOL EXIDINE 4% CHG 4OZ 29002-404

## (undated) DEVICE — TUBING SMOKE EVAC ATTACHMENT E3590

## (undated) DEVICE — STPL SKIN 35W ROTATING HEAD PRW35

## (undated) DEVICE — NDL 18GA 1.5" 305196

## (undated) DEVICE — SU MONOCRYL 4-0 PS-2 27" UND Y426H

## (undated) DEVICE — STPL SKIN 35W APPOSE 8886803712

## (undated) DEVICE — CATH TRAY FOLEY SURESTEP 16FR DRAIN BAG STATOCK A899916

## (undated) DEVICE — DRSG BANDAID 3/4X3"

## (undated) DEVICE — ESU HARMONIC FOCUS SHEARS CVD 9CM FSC9

## (undated) DEVICE — DRSG GAUZE 4X4" 8044

## (undated) DEVICE — GOWN IMPERVIOUS SPECIALTY XLG/XLONG 32474

## (undated) DEVICE — SPONGE RAY-TEC 4X8" 7318

## (undated) DEVICE — BAG CLEAR TRASH 1.3M 39X33" P4040C

## (undated) DEVICE — BNDG ABDOMINAL BINDER 9X45-62" 79-89071

## (undated) DEVICE — DRSG KERLIX 4 1/2"X4YDS ROLL 6730

## (undated) RX ORDER — SCOLOPAMINE TRANSDERMAL SYSTEM 1 MG/1
PATCH, EXTENDED RELEASE TRANSDERMAL
Status: DISPENSED
Start: 2018-06-22

## (undated) RX ORDER — DIPHENHYDRAMINE HCL 25 MG
CAPSULE ORAL
Status: DISPENSED
Start: 2018-11-14

## (undated) RX ORDER — ONDANSETRON 2 MG/ML
INJECTION INTRAMUSCULAR; INTRAVENOUS
Status: DISPENSED
Start: 2018-05-29

## (undated) RX ORDER — HYDROMORPHONE HYDROCHLORIDE 1 MG/ML
INJECTION, SOLUTION INTRAMUSCULAR; INTRAVENOUS; SUBCUTANEOUS
Status: DISPENSED
Start: 2020-06-15

## (undated) RX ORDER — GABAPENTIN 600 MG/1
TABLET ORAL
Status: DISPENSED
Start: 2018-05-29

## (undated) RX ORDER — ACETAMINOPHEN 500 MG
TABLET ORAL
Status: DISPENSED
Start: 2018-05-29

## (undated) RX ORDER — KETOROLAC TROMETHAMINE 30 MG/ML
INJECTION, SOLUTION INTRAMUSCULAR; INTRAVENOUS
Status: DISPENSED
Start: 2018-05-29

## (undated) RX ORDER — BUPIVACAINE HYDROCHLORIDE 2.5 MG/ML
INJECTION, SOLUTION EPIDURAL; INFILTRATION; INTRACAUDAL
Status: DISPENSED
Start: 2018-05-29

## (undated) RX ORDER — ACETAMINOPHEN 500 MG
TABLET ORAL
Status: DISPENSED
Start: 2018-06-22

## (undated) RX ORDER — PROPOFOL 10 MG/ML
INJECTION, EMULSION INTRAVENOUS
Status: DISPENSED
Start: 2018-05-29

## (undated) RX ORDER — FENTANYL CITRATE 50 UG/ML
INJECTION, SOLUTION INTRAMUSCULAR; INTRAVENOUS
Status: DISPENSED
Start: 2018-06-22

## (undated) RX ORDER — PROPOFOL 10 MG/ML
INJECTION, EMULSION INTRAVENOUS
Status: DISPENSED
Start: 2018-11-14

## (undated) RX ORDER — CEFAZOLIN SODIUM 2 G/100ML
INJECTION, SOLUTION INTRAVENOUS
Status: DISPENSED
Start: 2018-11-14

## (undated) RX ORDER — LIDOCAINE HYDROCHLORIDE 10 MG/ML
INJECTION, SOLUTION EPIDURAL; INFILTRATION; INTRACAUDAL; PERINEURAL
Status: DISPENSED
Start: 2018-05-29

## (undated) RX ORDER — DIPHENHYDRAMINE HYDROCHLORIDE 50 MG/ML
INJECTION INTRAMUSCULAR; INTRAVENOUS
Status: DISPENSED
Start: 2020-06-15

## (undated) RX ORDER — LIDOCAINE HYDROCHLORIDE 20 MG/ML
INJECTION, SOLUTION EPIDURAL; INFILTRATION; INTRACAUDAL; PERINEURAL
Status: DISPENSED
Start: 2018-11-14

## (undated) RX ORDER — CEFAZOLIN SODIUM 2 G/100ML
INJECTION, SOLUTION INTRAVENOUS
Status: DISPENSED
Start: 2018-05-29

## (undated) RX ORDER — OXYCODONE AND ACETAMINOPHEN 5; 325 MG/1; MG/1
TABLET ORAL
Status: DISPENSED
Start: 2020-06-15

## (undated) RX ORDER — ACETAMINOPHEN 500 MG
TABLET ORAL
Status: DISPENSED
Start: 2020-06-15

## (undated) RX ORDER — ONDANSETRON 2 MG/ML
INJECTION INTRAMUSCULAR; INTRAVENOUS
Status: DISPENSED
Start: 2020-06-15

## (undated) RX ORDER — CEFAZOLIN SODIUM 2 G/100ML
INJECTION, SOLUTION INTRAVENOUS
Status: DISPENSED
Start: 2020-06-15

## (undated) RX ORDER — FENTANYL CITRATE 50 UG/ML
INJECTION, SOLUTION INTRAMUSCULAR; INTRAVENOUS
Status: DISPENSED
Start: 2018-11-14

## (undated) RX ORDER — OXYCODONE HCL 10 MG/1
TABLET, FILM COATED, EXTENDED RELEASE ORAL
Status: DISPENSED
Start: 2018-05-29

## (undated) RX ORDER — CEFAZOLIN SODIUM 1 G/3ML
INJECTION, POWDER, FOR SOLUTION INTRAMUSCULAR; INTRAVENOUS
Status: DISPENSED
Start: 2018-05-29

## (undated) RX ORDER — FENTANYL CITRATE 50 UG/ML
INJECTION, SOLUTION INTRAMUSCULAR; INTRAVENOUS
Status: DISPENSED
Start: 2018-05-29

## (undated) RX ORDER — DEXAMETHASONE SODIUM PHOSPHATE 4 MG/ML
INJECTION, SOLUTION INTRA-ARTICULAR; INTRALESIONAL; INTRAMUSCULAR; INTRAVENOUS; SOFT TISSUE
Status: DISPENSED
Start: 2018-05-29

## (undated) RX ORDER — GLYCOPYRROLATE 0.2 MG/ML
INJECTION INTRAMUSCULAR; INTRAVENOUS
Status: DISPENSED
Start: 2018-05-29

## (undated) RX ORDER — SCOLOPAMINE TRANSDERMAL SYSTEM 1 MG/1
PATCH, EXTENDED RELEASE TRANSDERMAL
Status: DISPENSED
Start: 2018-05-29

## (undated) RX ORDER — PROPOFOL 10 MG/ML
INJECTION, EMULSION INTRAVENOUS
Status: DISPENSED
Start: 2020-06-15

## (undated) RX ORDER — FENTANYL CITRATE 50 UG/ML
INJECTION, SOLUTION INTRAMUSCULAR; INTRAVENOUS
Status: DISPENSED
Start: 2020-06-15

## (undated) RX ORDER — CEFAZOLIN SODIUM 2 G/100ML
INJECTION, SOLUTION INTRAVENOUS
Status: DISPENSED
Start: 2018-06-22

## (undated) RX ORDER — ONDANSETRON 2 MG/ML
INJECTION INTRAMUSCULAR; INTRAVENOUS
Status: DISPENSED
Start: 2018-11-14

## (undated) RX ORDER — PHENYLEPHRINE HCL IN 0.9% NACL 1 MG/10 ML
SYRINGE (ML) INTRAVENOUS
Status: DISPENSED
Start: 2018-05-29

## (undated) RX ORDER — BUPIVACAINE HYDROCHLORIDE 2.5 MG/ML
INJECTION, SOLUTION EPIDURAL; INFILTRATION; INTRACAUDAL
Status: DISPENSED
Start: 2018-06-22

## (undated) RX ORDER — DEXAMETHASONE SODIUM PHOSPHATE 4 MG/ML
INJECTION, SOLUTION INTRA-ARTICULAR; INTRALESIONAL; INTRAMUSCULAR; INTRAVENOUS; SOFT TISSUE
Status: DISPENSED
Start: 2018-11-14

## (undated) RX ORDER — KETOROLAC TROMETHAMINE 30 MG/ML
INJECTION, SOLUTION INTRAMUSCULAR; INTRAVENOUS
Status: DISPENSED
Start: 2020-06-15

## (undated) RX ORDER — LIDOCAINE HYDROCHLORIDE 20 MG/ML
INJECTION, SOLUTION EPIDURAL; INFILTRATION; INTRACAUDAL; PERINEURAL
Status: DISPENSED
Start: 2020-06-15

## (undated) RX ORDER — ACETAMINOPHEN 500 MG
TABLET ORAL
Status: DISPENSED
Start: 2018-11-14

## (undated) RX ORDER — OXYCODONE AND ACETAMINOPHEN 5; 325 MG/1; MG/1
TABLET ORAL
Status: DISPENSED
Start: 2018-11-14

## (undated) RX ORDER — HYDROMORPHONE HYDROCHLORIDE 1 MG/ML
INJECTION, SOLUTION INTRAMUSCULAR; INTRAVENOUS; SUBCUTANEOUS
Status: DISPENSED
Start: 2018-11-14

## (undated) RX ORDER — DEXAMETHASONE SODIUM PHOSPHATE 4 MG/ML
INJECTION, SOLUTION INTRA-ARTICULAR; INTRALESIONAL; INTRAMUSCULAR; INTRAVENOUS; SOFT TISSUE
Status: DISPENSED
Start: 2020-06-15